# Patient Record
Sex: FEMALE | Race: NATIVE HAWAIIAN OR OTHER PACIFIC ISLANDER | NOT HISPANIC OR LATINO | URBAN - METROPOLITAN AREA
[De-identification: names, ages, dates, MRNs, and addresses within clinical notes are randomized per-mention and may not be internally consistent; named-entity substitution may affect disease eponyms.]

---

## 2017-01-14 ENCOUNTER — INPATIENT (INPATIENT)
Facility: HOSPITAL | Age: 77
LOS: 11 days | Discharge: HOME | DRG: 64 | End: 2017-01-26
Attending: INTERNAL MEDICINE | Admitting: INTERNAL MEDICINE
Payer: COMMERCIAL

## 2017-01-14 VITALS
DIASTOLIC BLOOD PRESSURE: 63 MMHG | WEIGHT: 179.9 LBS | HEART RATE: 97 BPM | RESPIRATION RATE: 16 BRPM | SYSTOLIC BLOOD PRESSURE: 141 MMHG | OXYGEN SATURATION: 100 %

## 2017-01-14 DIAGNOSIS — I63.9 CEREBRAL INFARCTION, UNSPECIFIED: ICD-10-CM

## 2017-01-14 DIAGNOSIS — Z90.49 ACQUIRED ABSENCE OF OTHER SPECIFIED PARTS OF DIGESTIVE TRACT: Chronic | ICD-10-CM

## 2017-01-14 DIAGNOSIS — I10 ESSENTIAL (PRIMARY) HYPERTENSION: ICD-10-CM

## 2017-01-14 DIAGNOSIS — Z41.8 ENCOUNTER FOR OTHER PROCEDURES FOR PURPOSES OTHER THAN REMEDYING HEALTH STATE: ICD-10-CM

## 2017-01-14 DIAGNOSIS — E11.9 TYPE 2 DIABETES MELLITUS WITHOUT COMPLICATIONS: ICD-10-CM

## 2017-01-14 LAB
ALBUMIN SERPL ELPH-MCNC: 3.9 G/DL — SIGNIFICANT CHANGE UP (ref 3.5–5)
ALP SERPL-CCNC: 69 U/L — SIGNIFICANT CHANGE UP (ref 40–120)
ALT FLD-CCNC: 31 U/L DA — SIGNIFICANT CHANGE UP (ref 10–60)
ANION GAP SERPL CALC-SCNC: 10 MMOL/L — SIGNIFICANT CHANGE UP (ref 5–17)
APTT BLD: 30 SEC — SIGNIFICANT CHANGE UP (ref 27.5–37.4)
AST SERPL-CCNC: 26 U/L — SIGNIFICANT CHANGE UP (ref 10–40)
BASOPHILS # BLD AUTO: 0.1 K/UL — SIGNIFICANT CHANGE UP (ref 0–0.2)
BASOPHILS NFR BLD AUTO: 0.8 % — SIGNIFICANT CHANGE UP (ref 0–2)
BILIRUB SERPL-MCNC: 0.6 MG/DL — SIGNIFICANT CHANGE UP (ref 0.2–1.2)
BUN SERPL-MCNC: 22 MG/DL — HIGH (ref 7–18)
CALCIUM SERPL-MCNC: 8.9 MG/DL — SIGNIFICANT CHANGE UP (ref 8.4–10.5)
CHLORIDE SERPL-SCNC: 97 MMOL/L — SIGNIFICANT CHANGE UP (ref 96–108)
CHOLEST SERPL-MCNC: 194 MG/DL — SIGNIFICANT CHANGE UP (ref 10–199)
CK SERPL-CCNC: 117 U/L — SIGNIFICANT CHANGE UP (ref 21–215)
CO2 SERPL-SCNC: 27 MMOL/L — SIGNIFICANT CHANGE UP (ref 22–31)
CREAT SERPL-MCNC: 1.02 MG/DL — SIGNIFICANT CHANGE UP (ref 0.5–1.3)
EOSINOPHIL # BLD AUTO: 0.6 K/UL — HIGH (ref 0–0.5)
EOSINOPHIL NFR BLD AUTO: 7.2 % — HIGH (ref 0–6)
GLUCOSE SERPL-MCNC: 188 MG/DL — HIGH (ref 70–99)
HBA1C BLD-MCNC: 7.5 % — HIGH (ref 4–5.6)
HCT VFR BLD CALC: 32.8 % — LOW (ref 34.5–45)
HDLC SERPL-MCNC: 82 MG/DL — SIGNIFICANT CHANGE UP (ref 40–125)
HGB BLD-MCNC: 11.7 G/DL — SIGNIFICANT CHANGE UP (ref 11.5–15.5)
INR BLD: 0.96 RATIO — SIGNIFICANT CHANGE UP (ref 0.88–1.16)
LIPID PNL WITH DIRECT LDL SERPL: 79 MG/DL — SIGNIFICANT CHANGE UP
LYMPHOCYTES # BLD AUTO: 2 K/UL — SIGNIFICANT CHANGE UP (ref 1–3.3)
LYMPHOCYTES # BLD AUTO: 23.2 % — SIGNIFICANT CHANGE UP (ref 13–44)
MCHC RBC-ENTMCNC: 29.2 PG — SIGNIFICANT CHANGE UP (ref 27–34)
MCHC RBC-ENTMCNC: 35.6 GM/DL — SIGNIFICANT CHANGE UP (ref 32–36)
MCV RBC AUTO: 81.9 FL — SIGNIFICANT CHANGE UP (ref 80–100)
MONOCYTES # BLD AUTO: 0.5 K/UL — SIGNIFICANT CHANGE UP (ref 0–0.9)
MONOCYTES NFR BLD AUTO: 5.4 % — SIGNIFICANT CHANGE UP (ref 2–14)
NEUTROPHILS # BLD AUTO: 5.6 K/UL — SIGNIFICANT CHANGE UP (ref 1.8–7.4)
NEUTROPHILS NFR BLD AUTO: 63.5 % — SIGNIFICANT CHANGE UP (ref 43–77)
PLATELET # BLD AUTO: 308 K/UL — SIGNIFICANT CHANGE UP (ref 150–400)
POTASSIUM SERPL-MCNC: 3.8 MMOL/L — SIGNIFICANT CHANGE UP (ref 3.5–5.3)
POTASSIUM SERPL-SCNC: 3.8 MMOL/L — SIGNIFICANT CHANGE UP (ref 3.5–5.3)
PROT SERPL-MCNC: 7.5 G/DL — SIGNIFICANT CHANGE UP (ref 6–8.3)
PROTHROM AB SERPL-ACNC: 10.8 SEC — SIGNIFICANT CHANGE UP (ref 10–13.1)
RBC # BLD: 4 M/UL — SIGNIFICANT CHANGE UP (ref 3.8–5.2)
RBC # FLD: 13.4 % — SIGNIFICANT CHANGE UP (ref 10.3–14.5)
SODIUM SERPL-SCNC: 134 MMOL/L — LOW (ref 135–145)
TOTAL CHOLESTEROL/HDL RATIO MEASUREMENT: 2.4 RATIO — LOW (ref 3.3–7.1)
TRIGL SERPL-MCNC: 166 MG/DL — HIGH (ref 10–149)
TROPONIN I SERPL-MCNC: 0.02 NG/ML — SIGNIFICANT CHANGE UP (ref 0–0.04)
TSH SERPL-MCNC: 1.27 UU/ML — SIGNIFICANT CHANGE UP (ref 0.34–4.82)
WBC # BLD: 8.8 K/UL — SIGNIFICANT CHANGE UP (ref 3.8–10.5)
WBC # FLD AUTO: 8.8 K/UL — SIGNIFICANT CHANGE UP (ref 3.8–10.5)

## 2017-01-14 PROCEDURE — 99291 CRITICAL CARE FIRST HOUR: CPT

## 2017-01-14 PROCEDURE — 99223 1ST HOSP IP/OBS HIGH 75: CPT

## 2017-01-14 RX ORDER — ATORVASTATIN CALCIUM 80 MG/1
40 TABLET, FILM COATED ORAL AT BEDTIME
Qty: 0 | Refills: 0 | Status: DISCONTINUED | OUTPATIENT
Start: 2017-01-14 | End: 2017-01-26

## 2017-01-14 RX ORDER — INSULIN LISPRO 100/ML
VIAL (ML) SUBCUTANEOUS
Qty: 0 | Refills: 0 | Status: DISCONTINUED | OUTPATIENT
Start: 2017-01-14 | End: 2017-01-26

## 2017-01-14 RX ORDER — ALTEPLASE 100 MG
66 KIT INTRAVENOUS ONCE
Qty: 0 | Refills: 0 | Status: COMPLETED | OUTPATIENT
Start: 2017-01-14 | End: 2017-01-14

## 2017-01-14 RX ORDER — ALTEPLASE 100 MG
7 KIT INTRAVENOUS ONCE
Qty: 0 | Refills: 0 | Status: COMPLETED | OUTPATIENT
Start: 2017-01-14 | End: 2017-01-14

## 2017-01-14 RX ORDER — ACETAMINOPHEN 500 MG
650 TABLET ORAL EVERY 6 HOURS
Qty: 0 | Refills: 0 | Status: DISCONTINUED | OUTPATIENT
Start: 2017-01-14 | End: 2017-01-26

## 2017-01-14 RX ORDER — LABETALOL HCL 100 MG
10 TABLET ORAL ONCE
Qty: 0 | Refills: 0 | Status: COMPLETED | OUTPATIENT
Start: 2017-01-14 | End: 2017-01-14

## 2017-01-14 RX ORDER — LIDOCAINE 4 G/100G
1 CREAM TOPICAL DAILY
Qty: 0 | Refills: 0 | Status: DISCONTINUED | OUTPATIENT
Start: 2017-01-14 | End: 2017-01-26

## 2017-01-14 RX ORDER — SODIUM CHLORIDE 9 MG/ML
1000 INJECTION, SOLUTION INTRAVENOUS
Qty: 0 | Refills: 0 | Status: DISCONTINUED | OUTPATIENT
Start: 2017-01-14 | End: 2017-01-14

## 2017-01-14 RX ORDER — INFLUENZA VIRUS VACCINE 15; 15; 15; 15 UG/.5ML; UG/.5ML; UG/.5ML; UG/.5ML
0.5 SUSPENSION INTRAMUSCULAR ONCE
Qty: 0 | Refills: 0 | Status: COMPLETED | OUTPATIENT
Start: 2017-01-14 | End: 2017-01-14

## 2017-01-14 RX ORDER — SODIUM CHLORIDE 9 MG/ML
1000 INJECTION, SOLUTION INTRAVENOUS
Qty: 0 | Refills: 0 | Status: DISCONTINUED | OUTPATIENT
Start: 2017-01-14 | End: 2017-01-15

## 2017-01-14 RX ORDER — LABETALOL HCL 100 MG
10 TABLET ORAL EVERY 8 HOURS
Qty: 0 | Refills: 0 | Status: DISCONTINUED | OUTPATIENT
Start: 2017-01-14 | End: 2017-01-16

## 2017-01-14 RX ADMIN — ALTEPLASE 420 MILLIGRAM(S): KIT at 10:22

## 2017-01-14 RX ADMIN — ATORVASTATIN CALCIUM 40 MILLIGRAM(S): 80 TABLET, FILM COATED ORAL at 22:40

## 2017-01-14 RX ADMIN — Medication 650 MILLIGRAM(S): at 18:04

## 2017-01-14 RX ADMIN — LIDOCAINE 1 PATCH: 4 CREAM TOPICAL at 14:26

## 2017-01-14 RX ADMIN — SODIUM CHLORIDE 60 MILLILITER(S): 9 INJECTION, SOLUTION INTRAVENOUS at 16:43

## 2017-01-14 RX ADMIN — Medication 650 MILLIGRAM(S): at 17:33

## 2017-01-14 RX ADMIN — ALTEPLASE 66 MILLIGRAM(S): KIT at 10:25

## 2017-01-14 RX ADMIN — SODIUM CHLORIDE 75 MILLILITER(S): 9 INJECTION, SOLUTION INTRAVENOUS at 14:59

## 2017-01-14 RX ADMIN — Medication 10 MILLIGRAM(S): at 16:57

## 2017-01-14 RX ADMIN — SODIUM CHLORIDE 50 MILLILITER(S): 9 INJECTION, SOLUTION INTRAVENOUS at 17:36

## 2017-01-14 NOTE — H&P ADULT. - ATTENDING COMMENTS
76 female with hx of HTN, fibromyalgia, presents with signs/symptoms concerning for acute CVA.  Decision made by neurology attending and ED attending to administer TPA.  Upon my exam, there was minimal residual deficits.

## 2017-01-14 NOTE — ED PROVIDER NOTE - CRITICAL CARE PROVIDED
additional history taking/interpretation of diagnostic studies/consult w/ pt's family directly relating to pts condition/consultation with other physicians/documentation/direct patient care (not related to procedure)

## 2017-01-14 NOTE — H&P ADULT. - MUSCULOSKELETAL
detailed exam no joint warmth/ROM intact/no calf tenderness/no joint swelling/no joint erythema/normal

## 2017-01-14 NOTE — ED PROVIDER NOTE - PROGRESS NOTE DETAILS
I spoke with Dr. Wilson. Agrees with plan for TPA if no contraindications. Labs pending. Extensive discussion with patient and son on risks/benefits/alternatives of TPA, including risk of ICH, worsening stroke, permanent disability, death. Patient and son consent to TPA. TPA bolus given by me. Patient endorsed to Dr. Armstrong. I spoke with Dr. Wilson. Agrees with plan for TPA if no contraindications. Labs pending. Extensive discussion with patient and son on risks/benefits/alternatives of TPA, including risk of ICH, worsening stroke, permanent disability, death. Patient and son consent to TPA. Dysphagia screen FAILED.

## 2017-01-14 NOTE — H&P ADULT. - RS GEN PE MLT RESP DETAILS PC
good air movement/normal/respirations non-labored/airway patent/no chest wall tenderness/no wheezes/breath sounds equal/no intercostal retractions/no rales/clear to auscultation bilaterally/no subcutaneous emphysema/no rhonchi

## 2017-01-14 NOTE — H&P ADULT. - PROBLEM SELECTOR PLAN 4
DVT ppx: no chemical anticoagulation for 24 hours 2/2 TPA use. On SCD boots for now. Start chemical anticoagulation 24 hours later with heparin sc once repeat CT head is done and rules out intracranial bleed..

## 2017-01-14 NOTE — H&P ADULT. - FAMILY HISTORY
Sibling  Still living? Unknown  Family history of cerebrovascular accident (CVA), Age at diagnosis: Age Unknown     Mother  Still living? Unknown  Family history of acute myocardial infarction, Age at diagnosis: Age Unknown

## 2017-01-14 NOTE — ED PROVIDER NOTE - PMH
<<----- Click to add NO pertinent Past Medical History DM (diabetes mellitus)    Fibromyalgia    HTN (hypertension)

## 2017-01-14 NOTE — H&P ADULT. - HISTORY OF PRESENT ILLNESS
75 yo female, from home, ambulates w/o assistance, PMHx of Fibromyalgia (on Lyrica 75 mg and Aleve), HTN (on ASA 81mg and Losartin 100/25), and DM (on Meformin 850mg), BIBA presents to the ED for AMS today. As per Son, pt woke up this morning acting normally, able to ambulate (Last seen at baseline 1 hour PTA (08:10AM)), but then pt developed L sided weakness and slurred speech. Son also notes that pt fell 36 hours ago in the middle of the night, unknown cause, with slurred speech, was given ASA 81mg and Magnesium to relief in Sx. Pt doesn't take plavix, coumadin, eliquis. Pt denies ha, fever, chills, abd pain, n/v/d, dysuria, hematuria, CP, SOB, previous CVA, recent surgery, blood in stool, or any other complaints. NKDA. 75 yo female, from home, ambulates w/o assistance, PMHx of Fibromyalgia (on Lyrica 75 mg and Aleve), HTN (on ASA 81mg and Losartin 100/25), and DM2 (on Meformin 850mg BID), BIBA presents to the ED for AMS, slurred speech and weakness today. As per pt. and Son, she woke up this morning acting normally, able to ambulate, took a shower but was feeling some imbalance and disequilibrium and had to sit down for a while. Afterwards she had breakfast and took her morning medications and was sitting on a chair, started feeling her tongue heavy and was not able to speak properly. Son saw her right away 8:35am noticed slurred speech, drooling with mild left facial droop, alter mental status and slouched and generalized weakness. EMS called immediately and in ED was noted left sided weakness and slurred speech, code stroke was called.   Son also notes that pt fell 36 hours ago in the middle of the night, unknown cause, with slurred speech, was given ASA 81mg and Magnesium to relief in Sx. Pt doesn't take plavix, coumadin, eliquis.   ROS negative except as above. Pt denies headache, fever, chills, abdominal pain, nausea, vomiting, diarrhea, dysuria, hematuria, CP, SOB, palpitations, previous CVA, recent surgery, blood in stool, or any other complaints.

## 2017-01-14 NOTE — ED ADULT NURSE NOTE - OBJECTIVE STATEMENT
YADIRA as a notification for rule out stroke, as per son slurred speech left side weakness today and was last seen normal at 0810 today; also report that he found pt on the floor after she took lyrica 2 days ago, did not witness her falling down on the floor. YADIRA as a notification for rule out stroke, as per son slurred speech left side weakness and left facial droop today and was last seen normal at 0810 today; also report that he found pt on the floor after she took lyrica 2 days ago, did not witness her falling down on the floor.

## 2017-01-14 NOTE — ED PROVIDER NOTE - OBJECTIVE STATEMENT
75 y/o female BIBA with PMHx of Fibromyalgia (on Lyrica 75 mg and Aleve), HTN (on ASA 81mg and Losartin 100/25), and DM (on Meformin 850mg) presents to the ED for AMS today. As per Son, pt woke up this morning acting normally, able to ambulate (Last seen at baseline 1 hour PTA (08:10AM)), but then pt developed L sided weakness and slurred speech. Son also notes that pt fell 36 hours ago in the middle of the night, unknown cause with slurred speech, was given ASA 81mg and Magnesium to relief in Sx. Pt doesn't take blood thinners. Pt denies fever, chills, abd pain, n/v/d, dysuria, hematuria, CP, SOB, previous CVA, recent surgery, blood in stool, or any other complaints. NKDA. 75 y/o female BIBA with PMHx of Fibromyalgia (on Lyrica 75 mg and Aleve), HTN (on ASA 81mg and Losartin 100/25), and DM (on Meformin 850mg) presents to the ED for AMS today. As per Son, pt woke up this morning acting normally, able to ambulate (Last seen at baseline 1 hour PTA (08:10AM)), but then pt developed L sided weakness and slurred speech. Son also notes that pt fell 36 hours ago in the middle of the night, unknown cause, with slurred speech, was given ASA 81mg and Magnesium to relief in Sx. Pt doesn't take plavix, coumadin, eliquis. Pt denies ha, fever, chills, abd pain, n/v/d, dysuria, hematuria, CP, SOB, previous CVA, recent surgery, blood in stool, or any other complaints. NKDA.

## 2017-01-14 NOTE — H&P ADULT. - PROBLEM SELECTOR PLAN 2
holding BP med (losartan/HCTZ) for now for permissive HTN for now due to CVA, will start D5NS to optimize cerebral perfusion.   -s/p Tpa maintaining BP<185/105, then will allow for permissive HTN and maintain BP <180/105 for 72 hours.   -Start labetalol push if HR is high and SBP>180 as pt received tpa.   -Vitals check Q1 hour as per ICU protocol.

## 2017-01-14 NOTE — H&P ADULT. - PROBLEM SELECTOR PLAN 1
presented with sudden onset of unresponsiveness, aphasia/slurred speech, generalized weakness around 8:30am, EMS called and brought to the ED  -In ED code stroke called at 09:39 am. ED attending spoke to Dr. Wilson who agreed for TPA if no contraindications, CT head showed no acute findings, including no intracranial bleeding, tPA given at 10:20am.  -EKG: NSR @94bpm, RBBB  -Will keep SBP<180/105 s/p tPA.   -no aspirin and no chemical anticoagulation for 24 hours.   -will start on moderate intensity statin, lipitor 40 qhs.   -f/u lipid panel, cardiac enzymes (1 set) negative.   -Neuro checks s/p TPA q15 min for 1 hour f/b q30 min for 2 hours f/b q1hr for 4 hours and then q2.   -No IV lines, no blood draws for 24 hours.   -Rpt CT head if mental status or neuro check changes, or repeat CT head in 24 hours.   -Rpt labs after 24 hours  -f/u echo, carotid doppler  -PT eval  -NPO for now until bedside swallow eval.   -Aspiration precaution, fall precaution.  -neurology dr. Wilson. presented with sudden onset of unresponsiveness, aphasia/slurred speech, generalized weakness around 8:30am, EMS called and brought to the ED  -In ED code stroke called at 09:39 am. ED attending spoke to Dr. Wilson who agreed for TPA if no contraindications, CT head showed no acute findings, including no intracranial bleeding, tPA given at 10:20am.  -EKG: NSR @94bpm, RBBB  -Will keep SBP<180/105 s/p tPA.   -no aspirin and no chemical anticoagulation for 24 hours.   -will start on moderate intensity statin, lipitor 40 qhs.   -f/u lipid panel, cardiac enzymes (1 set) negative.   -Neuro checks s/p TPA q15 min for 1 hour f/b q30 min for 2 hours f/b q1hr for 4 hours and then q2.   -No IV lines, no blood draws for 24 hours.   -Rpt CT head if mental status or neuro check changes, or repeat CT head in 24 hours.   -Rpt labs after 24 hours  -f/u MRI brain, MRA head, echo, carotid doppler  -PT eval  -NPO for now until bedside swallow eval after 24 hours from tPA.   -Aspiration precaution, fall precaution.  -neurology dr. Wilson.

## 2017-01-14 NOTE — ED PROVIDER NOTE - NS ED MD SCRIBE ATTENDING SCRIBE SECTIONS
REVIEW OF SYSTEMS/VITAL SIGNS( Pullset)/HISTORY OF PRESENT ILLNESS/PHYSICAL EXAM/PAST MEDICAL/SURGICAL/SOCIAL HISTORY/HIV/DISPOSITION

## 2017-01-14 NOTE — H&P ADULT. - ASSESSMENT
In ED code stroke called at 09:39 am. ED attending spoke to Dr. Wilson who agreed for TPA if no contraindications, CT head showed no acute findings, including no intracranial bleeding, tPA given at 10:20am. 77 yo female, from home, ambulates w/o assistance, PMHx of Fibromyalgia (on Lyrica 75 mg and Aleve), HTN (on ASA 81mg and Losartin 100/25), and DM2 (on Meformin 850mg BID), BIBA presents to the ED for AMS, slurred speech and weakness today, last seen normal 8:10am, as per son symptoms started ~8:35am, EMS called.   In ED code stroke called at 09:39 am. ED attending spoke to Dr. Wilson who agreed for TPA if no contraindications, CT head showed no acute findings, including no intracranial bleeding, tPA given at 10:20am.

## 2017-01-15 LAB
ALBUMIN SERPL ELPH-MCNC: 3.5 G/DL — SIGNIFICANT CHANGE UP (ref 3.5–5)
ALP SERPL-CCNC: 58 U/L — SIGNIFICANT CHANGE UP (ref 40–120)
ALT FLD-CCNC: 30 U/L DA — SIGNIFICANT CHANGE UP (ref 10–60)
ANION GAP SERPL CALC-SCNC: 12 MMOL/L — SIGNIFICANT CHANGE UP (ref 5–17)
AST SERPL-CCNC: 30 U/L — SIGNIFICANT CHANGE UP (ref 10–40)
BILIRUB DIRECT SERPL-MCNC: 0.1 MG/DL — SIGNIFICANT CHANGE UP (ref 0–0.2)
BILIRUB INDIRECT FLD-MCNC: 0.8 MG/DL — SIGNIFICANT CHANGE UP (ref 0.2–1)
BILIRUB SERPL-MCNC: 0.9 MG/DL — SIGNIFICANT CHANGE UP (ref 0.2–1.2)
BUN SERPL-MCNC: 14 MG/DL — SIGNIFICANT CHANGE UP (ref 7–18)
CALCIUM SERPL-MCNC: 8.7 MG/DL — SIGNIFICANT CHANGE UP (ref 8.4–10.5)
CHLORIDE SERPL-SCNC: 98 MMOL/L — SIGNIFICANT CHANGE UP (ref 96–108)
CK MB BLD-MCNC: 1.1 % — SIGNIFICANT CHANGE UP (ref 0–3.5)
CK MB CFR SERPL CALC: 1.7 NG/ML — SIGNIFICANT CHANGE UP (ref 0–3.6)
CK SERPL-CCNC: 159 U/L — SIGNIFICANT CHANGE UP (ref 21–215)
CO2 SERPL-SCNC: 24 MMOL/L — SIGNIFICANT CHANGE UP (ref 22–31)
CREAT SERPL-MCNC: 0.73 MG/DL — SIGNIFICANT CHANGE UP (ref 0.5–1.3)
GLUCOSE SERPL-MCNC: 119 MG/DL — HIGH (ref 70–99)
HBA1C BLD-MCNC: 7.3 % — HIGH (ref 4–5.6)
MAGNESIUM SERPL-MCNC: 1.8 MG/DL — SIGNIFICANT CHANGE UP (ref 1.8–2.4)
PHOSPHATE SERPL-MCNC: 3.1 MG/DL — SIGNIFICANT CHANGE UP (ref 2.5–4.5)
POTASSIUM SERPL-MCNC: 5 MMOL/L — SIGNIFICANT CHANGE UP (ref 3.5–5.3)
POTASSIUM SERPL-SCNC: 5 MMOL/L — SIGNIFICANT CHANGE UP (ref 3.5–5.3)
PROT SERPL-MCNC: 7 G/DL — SIGNIFICANT CHANGE UP (ref 6–8.3)
SODIUM SERPL-SCNC: 134 MMOL/L — LOW (ref 135–145)
TROPONIN I SERPL-MCNC: 0.03 NG/ML — SIGNIFICANT CHANGE UP (ref 0–0.04)

## 2017-01-15 PROCEDURE — 99291 CRITICAL CARE FIRST HOUR: CPT

## 2017-01-15 RX ORDER — NIMODIPINE 60 MG/10ML
60 SOLUTION ORAL EVERY 4 HOURS
Qty: 0 | Refills: 0 | Status: DISCONTINUED | OUTPATIENT
Start: 2017-01-15 | End: 2017-01-15

## 2017-01-15 RX ORDER — HEPARIN SODIUM 5000 [USP'U]/ML
5000 INJECTION INTRAVENOUS; SUBCUTANEOUS EVERY 8 HOURS
Qty: 0 | Refills: 0 | Status: DISCONTINUED | OUTPATIENT
Start: 2017-01-15 | End: 2017-01-15

## 2017-01-15 RX ORDER — ASPIRIN/CALCIUM CARB/MAGNESIUM 324 MG
81 TABLET ORAL DAILY
Qty: 0 | Refills: 0 | Status: DISCONTINUED | OUTPATIENT
Start: 2017-01-15 | End: 2017-01-26

## 2017-01-15 RX ORDER — LOSARTAN POTASSIUM 100 MG/1
25 TABLET, FILM COATED ORAL DAILY
Qty: 0 | Refills: 0 | Status: DISCONTINUED | OUTPATIENT
Start: 2017-01-15 | End: 2017-01-26

## 2017-01-15 RX ORDER — NIMODIPINE 60 MG/10ML
60 SOLUTION ORAL EVERY 4 HOURS
Qty: 0 | Refills: 0 | Status: DISCONTINUED | OUTPATIENT
Start: 2017-01-15 | End: 2017-01-26

## 2017-01-15 RX ADMIN — LIDOCAINE 1 PATCH: 4 CREAM TOPICAL at 03:26

## 2017-01-15 RX ADMIN — Medication 1: at 11:51

## 2017-01-15 RX ADMIN — LOSARTAN POTASSIUM 25 MILLIGRAM(S): 100 TABLET, FILM COATED ORAL at 15:58

## 2017-01-15 RX ADMIN — Medication 2: at 18:13

## 2017-01-15 RX ADMIN — LIDOCAINE 1 PATCH: 4 CREAM TOPICAL at 11:47

## 2017-01-15 RX ADMIN — HEPARIN SODIUM 5000 UNIT(S): 5000 INJECTION INTRAVENOUS; SUBCUTANEOUS at 15:00

## 2017-01-15 RX ADMIN — Medication: at 06:29

## 2017-01-15 RX ADMIN — ATORVASTATIN CALCIUM 40 MILLIGRAM(S): 80 TABLET, FILM COATED ORAL at 21:11

## 2017-01-15 RX ADMIN — LIDOCAINE 1 PATCH: 4 CREAM TOPICAL at 23:41

## 2017-01-15 RX ADMIN — Medication 81 MILLIGRAM(S): at 15:58

## 2017-01-15 RX ADMIN — NIMODIPINE 60 MILLIGRAM(S): 60 SOLUTION ORAL at 21:11

## 2017-01-15 NOTE — PHYSICAL THERAPY INITIAL EVALUATION ADULT - GENERAL OBSERVATIONS, REHAB EVAL
awake, alert, NAD; IV in situ on right antecubital fossa area and right wrist; + indwelling urethral catheter

## 2017-01-15 NOTE — PHYSICAL THERAPY INITIAL EVALUATION ADULT - MANUAL MUSCLE TESTING RESULTS, REHAB EVAL
MMT on right upper and lower extremities are grossly graded 4/5; MMT on left upper and lower extremities are grossly graded 3+/5

## 2017-01-16 LAB
ANION GAP SERPL CALC-SCNC: 11 MMOL/L — SIGNIFICANT CHANGE UP (ref 5–17)
BUN SERPL-MCNC: 15 MG/DL — SIGNIFICANT CHANGE UP (ref 7–18)
CALCIUM SERPL-MCNC: 9.3 MG/DL — SIGNIFICANT CHANGE UP (ref 8.4–10.5)
CHLORIDE SERPL-SCNC: 100 MMOL/L — SIGNIFICANT CHANGE UP (ref 96–108)
CO2 SERPL-SCNC: 25 MMOL/L — SIGNIFICANT CHANGE UP (ref 22–31)
CREAT SERPL-MCNC: 0.88 MG/DL — SIGNIFICANT CHANGE UP (ref 0.5–1.3)
GLUCOSE SERPL-MCNC: 193 MG/DL — HIGH (ref 70–99)
HCT VFR BLD CALC: 36.4 % — SIGNIFICANT CHANGE UP (ref 34.5–45)
HGB BLD-MCNC: 12.4 G/DL — SIGNIFICANT CHANGE UP (ref 11.5–15.5)
MAGNESIUM SERPL-MCNC: 2 MG/DL — SIGNIFICANT CHANGE UP (ref 1.8–2.4)
MCHC RBC-ENTMCNC: 28.3 PG — SIGNIFICANT CHANGE UP (ref 27–34)
MCHC RBC-ENTMCNC: 34 GM/DL — SIGNIFICANT CHANGE UP (ref 32–36)
MCV RBC AUTO: 83.3 FL — SIGNIFICANT CHANGE UP (ref 80–100)
PHOSPHATE SERPL-MCNC: 3 MG/DL — SIGNIFICANT CHANGE UP (ref 2.5–4.5)
PLATELET # BLD AUTO: 342 K/UL — SIGNIFICANT CHANGE UP (ref 150–400)
POTASSIUM SERPL-MCNC: 4 MMOL/L — SIGNIFICANT CHANGE UP (ref 3.5–5.3)
POTASSIUM SERPL-SCNC: 4 MMOL/L — SIGNIFICANT CHANGE UP (ref 3.5–5.3)
RBC # BLD: 4.37 M/UL — SIGNIFICANT CHANGE UP (ref 3.8–5.2)
RBC # FLD: 13.7 % — SIGNIFICANT CHANGE UP (ref 10.3–14.5)
SODIUM SERPL-SCNC: 136 MMOL/L — SIGNIFICANT CHANGE UP (ref 135–145)
WBC # BLD: 11.4 K/UL — HIGH (ref 3.8–10.5)
WBC # FLD AUTO: 11.4 K/UL — HIGH (ref 3.8–10.5)

## 2017-01-16 PROCEDURE — 93880 EXTRACRANIAL BILAT STUDY: CPT | Mod: 26

## 2017-01-16 RX ORDER — LANOLIN ALCOHOL/MO/W.PET/CERES
3 CREAM (GRAM) TOPICAL AT BEDTIME
Qty: 0 | Refills: 0 | Status: COMPLETED | OUTPATIENT
Start: 2017-01-16 | End: 2017-01-17

## 2017-01-16 RX ORDER — PANTOPRAZOLE SODIUM 20 MG/1
40 TABLET, DELAYED RELEASE ORAL
Qty: 0 | Refills: 0 | Status: DISCONTINUED | OUTPATIENT
Start: 2017-01-16 | End: 2017-01-26

## 2017-01-16 RX ADMIN — NIMODIPINE 60 MILLIGRAM(S): 60 SOLUTION ORAL at 01:06

## 2017-01-16 RX ADMIN — NIMODIPINE 60 MILLIGRAM(S): 60 SOLUTION ORAL at 06:03

## 2017-01-16 RX ADMIN — ATORVASTATIN CALCIUM 40 MILLIGRAM(S): 80 TABLET, FILM COATED ORAL at 21:39

## 2017-01-16 RX ADMIN — NIMODIPINE 60 MILLIGRAM(S): 60 SOLUTION ORAL at 21:39

## 2017-01-16 RX ADMIN — LIDOCAINE 1 PATCH: 4 CREAM TOPICAL at 13:58

## 2017-01-16 RX ADMIN — NIMODIPINE 60 MILLIGRAM(S): 60 SOLUTION ORAL at 17:26

## 2017-01-16 RX ADMIN — Medication 2: at 17:26

## 2017-01-16 RX ADMIN — NIMODIPINE 60 MILLIGRAM(S): 60 SOLUTION ORAL at 10:33

## 2017-01-16 RX ADMIN — LOSARTAN POTASSIUM 25 MILLIGRAM(S): 100 TABLET, FILM COATED ORAL at 06:03

## 2017-01-16 RX ADMIN — Medication 2: at 08:30

## 2017-01-16 RX ADMIN — Medication 1: at 13:58

## 2017-01-16 RX ADMIN — Medication 3 MILLIGRAM(S): at 21:39

## 2017-01-16 RX ADMIN — NIMODIPINE 60 MILLIGRAM(S): 60 SOLUTION ORAL at 13:58

## 2017-01-16 RX ADMIN — Medication 81 MILLIGRAM(S): at 13:59

## 2017-01-17 LAB
ANION GAP SERPL CALC-SCNC: 11 MMOL/L — SIGNIFICANT CHANGE UP (ref 5–17)
BASOPHILS # BLD AUTO: 0.1 K/UL — SIGNIFICANT CHANGE UP (ref 0–0.2)
BASOPHILS NFR BLD AUTO: 0.9 % — SIGNIFICANT CHANGE UP (ref 0–2)
BUN SERPL-MCNC: 24 MG/DL — HIGH (ref 7–18)
CALCIUM SERPL-MCNC: 8.9 MG/DL — SIGNIFICANT CHANGE UP (ref 8.4–10.5)
CHLORIDE SERPL-SCNC: 102 MMOL/L — SIGNIFICANT CHANGE UP (ref 96–108)
CO2 SERPL-SCNC: 25 MMOL/L — SIGNIFICANT CHANGE UP (ref 22–31)
CREAT SERPL-MCNC: 1 MG/DL — SIGNIFICANT CHANGE UP (ref 0.5–1.3)
EOSINOPHIL # BLD AUTO: 0.3 K/UL — SIGNIFICANT CHANGE UP (ref 0–0.5)
EOSINOPHIL NFR BLD AUTO: 2.7 % — SIGNIFICANT CHANGE UP (ref 0–6)
GLUCOSE SERPL-MCNC: 204 MG/DL — HIGH (ref 70–99)
HCT VFR BLD CALC: 35.8 % — SIGNIFICANT CHANGE UP (ref 34.5–45)
HGB BLD-MCNC: 12.2 G/DL — SIGNIFICANT CHANGE UP (ref 11.5–15.5)
LYMPHOCYTES # BLD AUTO: 1.8 K/UL — SIGNIFICANT CHANGE UP (ref 1–3.3)
LYMPHOCYTES # BLD AUTO: 14.1 % — SIGNIFICANT CHANGE UP (ref 13–44)
MCHC RBC-ENTMCNC: 28.7 PG — SIGNIFICANT CHANGE UP (ref 27–34)
MCHC RBC-ENTMCNC: 34.2 GM/DL — SIGNIFICANT CHANGE UP (ref 32–36)
MCV RBC AUTO: 83.9 FL — SIGNIFICANT CHANGE UP (ref 80–100)
MONOCYTES # BLD AUTO: 0.9 K/UL — SIGNIFICANT CHANGE UP (ref 0–0.9)
MONOCYTES NFR BLD AUTO: 6.8 % — SIGNIFICANT CHANGE UP (ref 2–14)
NEUTROPHILS # BLD AUTO: 9.7 K/UL — HIGH (ref 1.8–7.4)
NEUTROPHILS NFR BLD AUTO: 75.5 % — SIGNIFICANT CHANGE UP (ref 43–77)
PLATELET # BLD AUTO: 317 K/UL — SIGNIFICANT CHANGE UP (ref 150–400)
POTASSIUM SERPL-MCNC: 4.1 MMOL/L — SIGNIFICANT CHANGE UP (ref 3.5–5.3)
POTASSIUM SERPL-SCNC: 4.1 MMOL/L — SIGNIFICANT CHANGE UP (ref 3.5–5.3)
RBC # BLD: 4.27 M/UL — SIGNIFICANT CHANGE UP (ref 3.8–5.2)
RBC # FLD: 14.4 % — SIGNIFICANT CHANGE UP (ref 10.3–14.5)
SODIUM SERPL-SCNC: 138 MMOL/L — SIGNIFICANT CHANGE UP (ref 135–145)
WBC # BLD: 12.8 K/UL — HIGH (ref 3.8–10.5)
WBC # FLD AUTO: 12.8 K/UL — HIGH (ref 3.8–10.5)

## 2017-01-17 PROCEDURE — 70551 MRI BRAIN STEM W/O DYE: CPT | Mod: 26

## 2017-01-17 PROCEDURE — 99233 SBSQ HOSP IP/OBS HIGH 50: CPT

## 2017-01-17 RX ADMIN — LIDOCAINE 1 PATCH: 4 CREAM TOPICAL at 23:15

## 2017-01-17 RX ADMIN — ATORVASTATIN CALCIUM 40 MILLIGRAM(S): 80 TABLET, FILM COATED ORAL at 21:24

## 2017-01-17 RX ADMIN — Medication 81 MILLIGRAM(S): at 11:50

## 2017-01-17 RX ADMIN — Medication 1: at 18:40

## 2017-01-17 RX ADMIN — LIDOCAINE 1 PATCH: 4 CREAM TOPICAL at 11:51

## 2017-01-17 RX ADMIN — LIDOCAINE 1 PATCH: 4 CREAM TOPICAL at 01:36

## 2017-01-17 RX ADMIN — Medication 5 MILLIGRAM(S): at 18:40

## 2017-01-17 RX ADMIN — Medication 2: at 08:40

## 2017-01-17 RX ADMIN — NIMODIPINE 60 MILLIGRAM(S): 60 SOLUTION ORAL at 21:25

## 2017-01-17 RX ADMIN — Medication 3 MILLIGRAM(S): at 21:25

## 2017-01-17 RX ADMIN — Medication 2: at 11:51

## 2017-01-17 RX ADMIN — NIMODIPINE 60 MILLIGRAM(S): 60 SOLUTION ORAL at 06:28

## 2017-01-17 RX ADMIN — NIMODIPINE 60 MILLIGRAM(S): 60 SOLUTION ORAL at 18:39

## 2017-01-17 RX ADMIN — LOSARTAN POTASSIUM 25 MILLIGRAM(S): 100 TABLET, FILM COATED ORAL at 06:27

## 2017-01-17 RX ADMIN — NIMODIPINE 60 MILLIGRAM(S): 60 SOLUTION ORAL at 02:45

## 2017-01-17 RX ADMIN — PANTOPRAZOLE SODIUM 40 MILLIGRAM(S): 20 TABLET, DELAYED RELEASE ORAL at 06:27

## 2017-01-17 RX ADMIN — NIMODIPINE 60 MILLIGRAM(S): 60 SOLUTION ORAL at 11:50

## 2017-01-18 LAB
ANION GAP SERPL CALC-SCNC: 11 MMOL/L — SIGNIFICANT CHANGE UP (ref 5–17)
APPEARANCE UR: ABNORMAL
BASOPHILS # BLD AUTO: 0.1 K/UL — SIGNIFICANT CHANGE UP (ref 0–0.2)
BASOPHILS NFR BLD AUTO: 1 % — SIGNIFICANT CHANGE UP (ref 0–2)
BILIRUB UR-MCNC: ABNORMAL
BUN SERPL-MCNC: 28 MG/DL — HIGH (ref 7–18)
CALCIUM SERPL-MCNC: 8.8 MG/DL — SIGNIFICANT CHANGE UP (ref 8.4–10.5)
CHLORIDE SERPL-SCNC: 102 MMOL/L — SIGNIFICANT CHANGE UP (ref 96–108)
CO2 SERPL-SCNC: 23 MMOL/L — SIGNIFICANT CHANGE UP (ref 22–31)
COLOR SPEC: YELLOW — SIGNIFICANT CHANGE UP
CREAT SERPL-MCNC: 1.01 MG/DL — SIGNIFICANT CHANGE UP (ref 0.5–1.3)
DIFF PNL FLD: ABNORMAL
EOSINOPHIL # BLD AUTO: 0.4 K/UL — SIGNIFICANT CHANGE UP (ref 0–0.5)
EOSINOPHIL NFR BLD AUTO: 3.1 % — SIGNIFICANT CHANGE UP (ref 0–6)
GLUCOSE SERPL-MCNC: 209 MG/DL — HIGH (ref 70–99)
GLUCOSE UR QL: 50 MG/DL
HCT VFR BLD CALC: 34.2 % — LOW (ref 34.5–45)
HGB BLD-MCNC: 11.7 G/DL — SIGNIFICANT CHANGE UP (ref 11.5–15.5)
KETONES UR-MCNC: NEGATIVE — SIGNIFICANT CHANGE UP
LEUKOCYTE ESTERASE UR-ACNC: ABNORMAL
LYMPHOCYTES # BLD AUTO: 1.7 K/UL — SIGNIFICANT CHANGE UP (ref 1–3.3)
LYMPHOCYTES # BLD AUTO: 12.6 % — LOW (ref 13–44)
MCHC RBC-ENTMCNC: 28.7 PG — SIGNIFICANT CHANGE UP (ref 27–34)
MCHC RBC-ENTMCNC: 34.2 GM/DL — SIGNIFICANT CHANGE UP (ref 32–36)
MCV RBC AUTO: 83.8 FL — SIGNIFICANT CHANGE UP (ref 80–100)
MONOCYTES # BLD AUTO: 0.9 K/UL — SIGNIFICANT CHANGE UP (ref 0–0.9)
MONOCYTES NFR BLD AUTO: 6.6 % — SIGNIFICANT CHANGE UP (ref 2–14)
NEUTROPHILS # BLD AUTO: 10.3 K/UL — HIGH (ref 1.8–7.4)
NEUTROPHILS NFR BLD AUTO: 76.8 % — SIGNIFICANT CHANGE UP (ref 43–77)
NITRITE UR-MCNC: NEGATIVE — SIGNIFICANT CHANGE UP
PH UR: 5 — SIGNIFICANT CHANGE UP (ref 4.8–8)
PLATELET # BLD AUTO: 325 K/UL — SIGNIFICANT CHANGE UP (ref 150–400)
POTASSIUM SERPL-MCNC: 3.9 MMOL/L — SIGNIFICANT CHANGE UP (ref 3.5–5.3)
POTASSIUM SERPL-SCNC: 3.9 MMOL/L — SIGNIFICANT CHANGE UP (ref 3.5–5.3)
PROT UR-MCNC: 100
RBC # BLD: 4.07 M/UL — SIGNIFICANT CHANGE UP (ref 3.8–5.2)
RBC # FLD: 13.9 % — SIGNIFICANT CHANGE UP (ref 10.3–14.5)
SODIUM SERPL-SCNC: 136 MMOL/L — SIGNIFICANT CHANGE UP (ref 135–145)
SP GR SPEC: 1.02 — SIGNIFICANT CHANGE UP (ref 1.01–1.02)
UROBILINOGEN FLD QL: NEGATIVE — SIGNIFICANT CHANGE UP
WBC # BLD: 13.4 K/UL — HIGH (ref 3.8–10.5)
WBC # FLD AUTO: 13.4 K/UL — HIGH (ref 3.8–10.5)

## 2017-01-18 PROCEDURE — 70544 MR ANGIOGRAPHY HEAD W/O DYE: CPT | Mod: 26

## 2017-01-18 RX ORDER — INSULIN GLARGINE 100 [IU]/ML
15 INJECTION, SOLUTION SUBCUTANEOUS AT BEDTIME
Qty: 0 | Refills: 0 | Status: DISCONTINUED | OUTPATIENT
Start: 2017-01-18 | End: 2017-01-26

## 2017-01-18 RX ORDER — INSULIN LISPRO 100/ML
4 VIAL (ML) SUBCUTANEOUS
Qty: 0 | Refills: 0 | Status: DISCONTINUED | OUTPATIENT
Start: 2017-01-18 | End: 2017-01-26

## 2017-01-18 RX ORDER — LANOLIN ALCOHOL/MO/W.PET/CERES
3 CREAM (GRAM) TOPICAL ONCE
Qty: 0 | Refills: 0 | Status: COMPLETED | OUTPATIENT
Start: 2017-01-18 | End: 2017-01-18

## 2017-01-18 RX ADMIN — NIMODIPINE 60 MILLIGRAM(S): 60 SOLUTION ORAL at 13:04

## 2017-01-18 RX ADMIN — Medication 3: at 08:20

## 2017-01-18 RX ADMIN — PANTOPRAZOLE SODIUM 40 MILLIGRAM(S): 20 TABLET, DELAYED RELEASE ORAL at 05:32

## 2017-01-18 RX ADMIN — LOSARTAN POTASSIUM 25 MILLIGRAM(S): 100 TABLET, FILM COATED ORAL at 05:32

## 2017-01-18 RX ADMIN — NIMODIPINE 60 MILLIGRAM(S): 60 SOLUTION ORAL at 17:20

## 2017-01-18 RX ADMIN — Medication 2: at 17:15

## 2017-01-18 RX ADMIN — Medication 4 UNIT(S): at 17:14

## 2017-01-18 RX ADMIN — Medication 3 MILLIGRAM(S): at 22:21

## 2017-01-18 RX ADMIN — LIDOCAINE 1 PATCH: 4 CREAM TOPICAL at 12:53

## 2017-01-18 RX ADMIN — Medication 2: at 12:53

## 2017-01-18 RX ADMIN — Medication 4 UNIT(S): at 12:53

## 2017-01-18 RX ADMIN — NIMODIPINE 60 MILLIGRAM(S): 60 SOLUTION ORAL at 05:32

## 2017-01-18 RX ADMIN — Medication 81 MILLIGRAM(S): at 12:52

## 2017-01-18 RX ADMIN — NIMODIPINE 60 MILLIGRAM(S): 60 SOLUTION ORAL at 22:21

## 2017-01-18 RX ADMIN — ATORVASTATIN CALCIUM 40 MILLIGRAM(S): 80 TABLET, FILM COATED ORAL at 22:21

## 2017-01-18 RX ADMIN — Medication 5 MILLIGRAM(S): at 08:20

## 2017-01-18 RX ADMIN — NIMODIPINE 60 MILLIGRAM(S): 60 SOLUTION ORAL at 02:18

## 2017-01-18 RX ADMIN — INSULIN GLARGINE 15 UNIT(S): 100 INJECTION, SOLUTION SUBCUTANEOUS at 22:21

## 2017-01-18 NOTE — DISCHARGE NOTE ADULT - PATIENT PORTAL LINK FT
“You can access the [facility] Patient Portal, offered by [facility], by registering with the following website: [email or web address]”

## 2017-01-18 NOTE — DISCHARGE NOTE ADULT - PLAN OF CARE
Go to rehab, follow up with outpatient Vascular surgeon Please continue to take aspirin and lipitor. Please go to acute rehab and follow up with their recommendations. Please follow up with vascular surgeon Dr. Brown for left carotid endarterectomy as outpatient for the stenosis in your left carotid artery. Please follow up with your primary physician to tell him of what has transpired while you were hospitalized. Please follow up with neurologist Dr. Wilson in 2 weeks for a follow up visit. Control blood pressure Please continue to take nimodipine until Feb 4, 2017. Please continue to take losartan. After Feb 4th, have a follow up visit with Dr. Abebe who will assess your blood pressure off of the nimodipine and adjust your blood pressure medications. Control blood sugar Your blood sugar was well controlled in the hospital on Lantus and premeal humalog. We will recommend that for now while you are in acute rehab. Please follow up with their recommendations for post rehab diabetic regimen. Control pain Please use tylenol and the lidoderm patch as pain control as prescribed. Follow up with your PCP for future pain med adjustments. Please continue to take aspirin and lipitor. Please participate in home PT and follow up with their recommendations. Please follow up with vascular surgeon Dr. Brown for left carotid endarterectomy as outpatient for the stenosis in your left carotid artery. Please follow up with your primary physician to tell him of what has transpired while you were hospitalized. Please follow up with neurologist Dr. Wilson in 2 weeks for a follow up visit. Your blood sugar was well controlled with metformin. We will continue that medication for you.

## 2017-01-18 NOTE — DISCHARGE NOTE ADULT - CARE PROVIDER_API CALL
Gregor Brown), Surgery; Vascular Surgery  [address]  [place] [address]  Phone: [phone]  Fax: [phone]    Yung Abebe), Cardiology Medicine  [address] [place] [address]  Phone: [phone]  Fax: [phone]    Oralia Wilson), Neurology  [address] [place] [address]  Phone: [phone]  Fax: [phone]

## 2017-01-18 NOTE — DISCHARGE NOTE ADULT - HOSPITAL COURSE
77 y/o F w/ PMH of Fibromyalgia (on Lyrica 75 mg and Aleve), HTN (on ASA 81mg and Losartin 100/25), and DM2 (on Meformin 850mg BID) p/w AMS, slurred speech and weakness. CT head negative, s/p tPA and admitted to ICU for monitoring.     1.Right MCA infarct and small frontal SAH: likely 2/2 uncontrolled HTN   -NIHSS score 8, s/p tPA and ICU monitoring   -Repeat CT head: probable evolving acute infarct in the right frontal white matter/corona radiata. New acute sulcal subarachnoid hemorrhage in the right frontal region.   -Carotid US b/l:  Right carotid system wnl. Left carotid system: Arteriosclerotic plaque. Carotid artery corresponding to 50-69% degree of stenosis.    -CTA Neck: >70% stenosis in the left carotid artery and 50-69% stenosis in the right carotid artery. --> vascular consulted, outpatient follow up for left carotid endarterectomy after discharge.  -MRI head: Acute right MCA territorial infarction without significant change. Small right frontal subarachnoid hemorrhage without significant change  -KEDAR showed no evidence of thrombus  -MRA showed atherosclerotic disease of the right MCA artery  -c/w Aspirin, Lipitor and Nimodipine  -c/w regular diet as patient does not have dysphagia     2. DM: moderately controlled, HbA1c 7.4, c/w HSS + Lantus  3. HTN: controlled, c/w Nimodipine and cozaar  4. UTI: pt got a course of rocephin.  5. GI PPX on protonix and DVT on SCD     Pt to go to acute rehab. Plan discussed and agreed with Dr. Motta. 75 y/o F w/ PMH of Fibromyalgia (on Lyrica 75 mg and Aleve), HTN (on ASA 81mg and Losartin 100/25), and DM2 (on Meformin 850mg BID) p/w AMS, slurred speech and weakness. CT head negative, s/p tPA and admitted to ICU for monitoring.     1.Right MCA infarct and small frontal SAH: likely 2/2 uncontrolled HTN   -NIHSS score 8, s/p tPA and ICU monitoring   -Repeat CT head: probable evolving acute infarct in the right frontal white matter/corona radiata. New acute sulcal subarachnoid hemorrhage in the right frontal region.   -Carotid US b/l:  Right carotid system wnl. Left carotid system: Arteriosclerotic plaque. Carotid artery corresponding to 50-69% degree of stenosis.    -CTA Neck: >70% stenosis in the left carotid artery and 50-69% stenosis in the right carotid artery. --> vascular consulted, outpatient follow up for left carotid endarterectomy after discharge.  -MRI head: Acute right MCA territorial infarction without significant change. Small right frontal subarachnoid hemorrhage without significant change  -KEDAR showed no evidence of thrombus  -MRA showed atherosclerotic disease of the right MCA artery  -c/w Aspirin, Lipitor and Nimodipine  -c/w regular diet as patient does not have dysphagia     2. DM: moderately controlled, HbA1c 7.4, c/w HSS + Lantus  3. HTN: controlled, c/w Nimodipine and cozaar  4. UTI: pt got a course of rocephin.  5. GI PPX on protonix and DVT on SCD     Pt to go for home PT. Plan discussed and agreed with Dr. Motta.

## 2017-01-18 NOTE — DISCHARGE NOTE ADULT - NS AS DC STROKE ED MATERIALS
Need for Followup After Discharge/Stroke Education Booklet/Call 911 for Stroke/Stroke Warning Signs and Symptoms/Risk Factors for Stroke/Prescribed Medications

## 2017-01-18 NOTE — DISCHARGE NOTE ADULT - MEDICATION SUMMARY - MEDICATIONS TO STOP TAKING
I will STOP taking the medications listed below when I get home from the hospital:    losartan-hydroCHLOROthiazide 100mg-25mg oral tablet  -- 1 tab(s) by mouth once a day    naproxen 250 mg oral tablet  -- 1 tab(s) by mouth 2 times a day, As Needed    Lyrica 75 mg oral capsule  -- 1 cap(s) by mouth once a day, As Needed    metFORMIN 850 mg oral tablet  -- 1 tab(s) by mouth 2 times a day (with meals) I will STOP taking the medications listed below when I get home from the hospital:    losartan-hydroCHLOROthiazide 100mg-25mg oral tablet  -- 1 tab(s) by mouth once a day    naproxen 250 mg oral tablet  -- 1 tab(s) by mouth 2 times a day, As Needed    Lyrica 75 mg oral capsule  -- 1 cap(s) by mouth once a day, As Needed

## 2017-01-18 NOTE — DISCHARGE NOTE ADULT - CARE PROVIDERS DIRECT ADDRESSES
,[email or web address],DirectAddress_Unknown

## 2017-01-18 NOTE — DISCHARGE NOTE ADULT - NS AS DC FOLLOWUP STROKE INST
Influenza vaccination (VIS Pub Date: August 19, 2014)/Stroke (includes: TIA/SAH/ICH/Ischemic Stroke) Smoking Cessation/Stroke (includes: TIA/SAH/ICH/Ischemic Stroke)/Influenza vaccination (VIS Pub Date: August 19, 2014)

## 2017-01-18 NOTE — DISCHARGE NOTE ADULT - MEDICATION SUMMARY - MEDICATIONS TO TAKE
I will START or STAY ON the medications listed below when I get home from the hospital:    aspirin 81 mg oral tablet  -- 1 tab(s) by mouth once a day  -- Indication: For Cerebral infarction    acetaminophen 325 mg oral tablet  -- 2 tab(s) by mouth every 6 hours, As needed, Mild Pain (1 - 3)  -- Indication: For Fibromyalgia    losartan 25 mg oral tablet  -- 1 tab(s) by mouth once a day  -- Indication: For Essential hypertension    insulin glargine  -- 15 unit(s) subcutaneous once a day (at bedtime)  -- Indication: For Type 2 diabetes mellitus    insulin lispro 100 units/mL subcutaneous solution  --  subcutaneous 3 times a day (before meals); 1 Unit(s) if Glucose 151 - 200  2 Unit(s) if Glucose 201 - 250  3 Unit(s) if Glucose 251 - 300  4 Unit(s) if Glucose 301 - 350  5 Unit(s) if Glucose 351 - 400  6 Unit(s) if Glucose GREATER THAN 400  -- Indication: For Type 2 diabetes mellitus    insulin lispro 100 units/mL subcutaneous solution  -- 4 unit(s) subcutaneous 3 times a day (before meals)  -- Indication: For Type 2 diabetes mellitus    ondansetron 2 mg/mL injectable solution  -- 4 milligram(s) injectable every 6 hours, As Needed  -- Indication: For Nausea/vomiting    atorvastatin 40 mg oral tablet  -- 1 tab(s) by mouth once a day (at bedtime)  -- Indication: For Cerebral infarction    niMODipine 30 mg oral capsule  -- 2 cap(s) by mouth every 4 hours  -- Indication: For Subarachnoid hemorrhage    lidocaine 5% topical film  -- Apply on skin to affected area once a day  -- Indication: For Pain    pantoprazole 40 mg oral delayed release tablet  -- 1 tab(s) by mouth once a day (before a meal)  -- Indication: For Need for prophylactic measure I will START or STAY ON the medications listed below when I get home from the hospital:    Rolling walker  -- Indication: For Walking assistance    acetaminophen 325 mg oral tablet  -- 2 tab(s) by mouth every 6 hours, As needed, Mild Pain (1 - 3)  -- Indication: For Pain    aspirin 81 mg oral tablet  -- 1 tab(s) by mouth once a day  -- Indication: For CVA    losartan 25 mg oral tablet  -- 1 tab(s) by mouth once a day  -- Indication: For HTN    metFORMIN 850 mg oral tablet  -- 1 tab(s) by mouth 2 times a day (with meals)  -- Indication: For Diabetes mellitus    atorvastatin 40 mg oral tablet  -- 1 tab(s) by mouth once a day (at bedtime)  -- Indication: For CVA    niMODipine 30 mg oral capsule  -- 2 cap(s) by mouth every 4 hours  -- Indication: For Subarachnoid hemorrhage    lidocaine 5% topical film  -- Apply on skin to affected area once a day  -- Indication: For Pain    pantoprazole 40 mg oral delayed release tablet  -- 1 tab(s) by mouth once a day (before a meal)  -- Indication: For Prophylaxis

## 2017-01-18 NOTE — DISCHARGE NOTE ADULT - CARE PLAN
Principal Discharge DX:	Cerebrovascular accident (CVA), unspecified mechanism  Goal:	Go to rehab, follow up with outpatient Vascular surgeon  Instructions for follow-up, activity and diet:	Please continue to take aspirin and lipitor. Please go to acute rehab and follow up with their recommendations. Please follow up with vascular surgeon Dr. Brown for left carotid endarterectomy as outpatient for the stenosis in your left carotid artery. Please follow up with your primary physician to tell him of what has transpired while you were hospitalized. Please follow up with neurologist Dr. Wilson in 2 weeks for a follow up visit.  Secondary Diagnosis:	Essential hypertension  Goal:	Control blood pressure  Instructions for follow-up, activity and diet:	Please continue to take nimodipine until Feb 4, 2017. Please continue to take losartan. After Feb 4th, have a follow up visit with Dr. Abebe who will assess your blood pressure off of the nimodipine and adjust your blood pressure medications.  Secondary Diagnosis:	Type 2 diabetes mellitus  Goal:	Control blood sugar  Instructions for follow-up, activity and diet:	Your blood sugar was well controlled in the hospital on Lantus and premeal humalog. We will recommend that for now while you are in acute rehab. Please follow up with their recommendations for post rehab diabetic regimen.  Secondary Diagnosis:	Fibromyalgia  Goal:	Control pain  Instructions for follow-up, activity and diet:	Please use tylenol and the lidoderm patch as pain control as prescribed. Follow up with your PCP for future pain med adjustments. Principal Discharge DX:	Cerebrovascular accident (CVA), unspecified mechanism  Goal:	Go to rehab, follow up with outpatient Vascular surgeon  Instructions for follow-up, activity and diet:	Please continue to take aspirin and lipitor. Please participate in home PT and follow up with their recommendations. Please follow up with vascular surgeon Dr. Brown for left carotid endarterectomy as outpatient for the stenosis in your left carotid artery. Please follow up with your primary physician to tell him of what has transpired while you were hospitalized. Please follow up with neurologist Dr. Wilson in 2 weeks for a follow up visit.  Secondary Diagnosis:	Essential hypertension  Goal:	Control blood pressure  Instructions for follow-up, activity and diet:	Please continue to take nimodipine until Feb 4, 2017. Please continue to take losartan. After Feb 4th, have a follow up visit with Dr. Abebe who will assess your blood pressure off of the nimodipine and adjust your blood pressure medications.  Secondary Diagnosis:	Type 2 diabetes mellitus  Goal:	Control blood sugar  Instructions for follow-up, activity and diet:	Your blood sugar was well controlled in the hospital on Lantus and premeal humalog. We will recommend that for now while you are in acute rehab. Please follow up with their recommendations for post rehab diabetic regimen.  Secondary Diagnosis:	Fibromyalgia  Goal:	Control pain  Instructions for follow-up, activity and diet:	Please use tylenol and the lidoderm patch as pain control as prescribed. Follow up with your PCP for future pain med adjustments. Principal Discharge DX:	Cerebrovascular accident (CVA), unspecified mechanism  Goal:	Go to rehab, follow up with outpatient Vascular surgeon  Instructions for follow-up, activity and diet:	Please continue to take aspirin and lipitor. Please participate in home PT and follow up with their recommendations. Please follow up with vascular surgeon Dr. Brown for left carotid endarterectomy as outpatient for the stenosis in your left carotid artery. Please follow up with your primary physician to tell him of what has transpired while you were hospitalized. Please follow up with neurologist Dr. Wilson in 2 weeks for a follow up visit.  Secondary Diagnosis:	Essential hypertension  Goal:	Control blood pressure  Instructions for follow-up, activity and diet:	Please continue to take nimodipine until Feb 4, 2017. Please continue to take losartan. After Feb 4th, have a follow up visit with Dr. Abebe who will assess your blood pressure off of the nimodipine and adjust your blood pressure medications.  Secondary Diagnosis:	Type 2 diabetes mellitus  Goal:	Control blood sugar  Instructions for follow-up, activity and diet:	Your blood sugar was well controlled with metformin. We will continue that medication for you.  Secondary Diagnosis:	Fibromyalgia  Goal:	Control pain  Instructions for follow-up, activity and diet:	Please use tylenol and the lidoderm patch as pain control as prescribed. Follow up with your PCP for future pain med adjustments.

## 2017-01-19 LAB
ANION GAP SERPL CALC-SCNC: 13 MMOL/L — SIGNIFICANT CHANGE UP (ref 5–17)
BASOPHILS # BLD AUTO: 0 K/UL — SIGNIFICANT CHANGE UP (ref 0–0.2)
BASOPHILS NFR BLD AUTO: 0.4 % — SIGNIFICANT CHANGE UP (ref 0–2)
BUN SERPL-MCNC: 43 MG/DL — HIGH (ref 7–18)
CALCIUM SERPL-MCNC: 8.1 MG/DL — LOW (ref 8.4–10.5)
CHLORIDE SERPL-SCNC: 105 MMOL/L — SIGNIFICANT CHANGE UP (ref 96–108)
CO2 SERPL-SCNC: 19 MMOL/L — LOW (ref 22–31)
CREAT SERPL-MCNC: 1.28 MG/DL — SIGNIFICANT CHANGE UP (ref 0.5–1.3)
EOSINOPHIL # BLD AUTO: 0 K/UL — SIGNIFICANT CHANGE UP (ref 0–0.5)
EOSINOPHIL NFR BLD AUTO: 0.3 % — SIGNIFICANT CHANGE UP (ref 0–6)
GLUCOSE SERPL-MCNC: 238 MG/DL — HIGH (ref 70–99)
HCT VFR BLD CALC: 34.5 % — SIGNIFICANT CHANGE UP (ref 34.5–45)
HGB BLD-MCNC: 12.2 G/DL — SIGNIFICANT CHANGE UP (ref 11.5–15.5)
LYMPHOCYTES # BLD AUTO: 0.5 K/UL — LOW (ref 1–3.3)
LYMPHOCYTES # BLD AUTO: 4.5 % — LOW (ref 13–44)
MCHC RBC-ENTMCNC: 29.4 PG — SIGNIFICANT CHANGE UP (ref 27–34)
MCHC RBC-ENTMCNC: 35.3 GM/DL — SIGNIFICANT CHANGE UP (ref 32–36)
MCV RBC AUTO: 83.4 FL — SIGNIFICANT CHANGE UP (ref 80–100)
MONOCYTES # BLD AUTO: 0.2 K/UL — SIGNIFICANT CHANGE UP (ref 0–0.9)
MONOCYTES NFR BLD AUTO: 2.3 % — SIGNIFICANT CHANGE UP (ref 2–14)
NEUTROPHILS # BLD AUTO: 9.7 K/UL — HIGH (ref 1.8–7.4)
NEUTROPHILS NFR BLD AUTO: 92.4 % — HIGH (ref 43–77)
OB PNL STL: POSITIVE
PLATELET # BLD AUTO: 333 K/UL — SIGNIFICANT CHANGE UP (ref 150–400)
POTASSIUM SERPL-MCNC: 4 MMOL/L — SIGNIFICANT CHANGE UP (ref 3.5–5.3)
POTASSIUM SERPL-SCNC: 4 MMOL/L — SIGNIFICANT CHANGE UP (ref 3.5–5.3)
RBC # BLD: 4.14 M/UL — SIGNIFICANT CHANGE UP (ref 3.8–5.2)
RBC # FLD: 14.2 % — SIGNIFICANT CHANGE UP (ref 10.3–14.5)
SODIUM SERPL-SCNC: 137 MMOL/L — SIGNIFICANT CHANGE UP (ref 135–145)
WBC # BLD: 10.5 K/UL — SIGNIFICANT CHANGE UP (ref 3.8–10.5)
WBC # FLD AUTO: 10.5 K/UL — SIGNIFICANT CHANGE UP (ref 3.8–10.5)

## 2017-01-19 PROCEDURE — 70498 CT ANGIOGRAPHY NECK: CPT | Mod: 26

## 2017-01-19 PROCEDURE — 70450 CT HEAD/BRAIN W/O DYE: CPT | Mod: 26

## 2017-01-19 RX ORDER — MORPHINE SULFATE 50 MG/1
2 CAPSULE, EXTENDED RELEASE ORAL ONCE
Qty: 0 | Refills: 0 | Status: DISCONTINUED | OUTPATIENT
Start: 2017-01-19 | End: 2017-01-19

## 2017-01-19 RX ORDER — SODIUM CHLORIDE 9 MG/ML
1000 INJECTION INTRAMUSCULAR; INTRAVENOUS; SUBCUTANEOUS
Qty: 0 | Refills: 0 | Status: DISCONTINUED | OUTPATIENT
Start: 2017-01-19 | End: 2017-01-20

## 2017-01-19 RX ORDER — CEFTRIAXONE 500 MG/1
INJECTION, POWDER, FOR SOLUTION INTRAMUSCULAR; INTRAVENOUS
Qty: 0 | Refills: 0 | Status: DISCONTINUED | OUTPATIENT
Start: 2017-01-19 | End: 2017-01-24

## 2017-01-19 RX ORDER — MORPHINE SULFATE 50 MG/1
1 CAPSULE, EXTENDED RELEASE ORAL ONCE
Qty: 0 | Refills: 0 | Status: DISCONTINUED | OUTPATIENT
Start: 2017-01-19 | End: 2017-01-19

## 2017-01-19 RX ORDER — CEFTRIAXONE 500 MG/1
1 INJECTION, POWDER, FOR SOLUTION INTRAMUSCULAR; INTRAVENOUS EVERY 24 HOURS
Qty: 0 | Refills: 0 | Status: DISCONTINUED | OUTPATIENT
Start: 2017-01-20 | End: 2017-01-24

## 2017-01-19 RX ORDER — CEFTRIAXONE 500 MG/1
1 INJECTION, POWDER, FOR SOLUTION INTRAMUSCULAR; INTRAVENOUS ONCE
Qty: 0 | Refills: 0 | Status: COMPLETED | OUTPATIENT
Start: 2017-01-19 | End: 2017-01-19

## 2017-01-19 RX ORDER — ONDANSETRON 8 MG/1
4 TABLET, FILM COATED ORAL ONCE
Qty: 0 | Refills: 0 | Status: DISCONTINUED | OUTPATIENT
Start: 2017-01-19 | End: 2017-01-26

## 2017-01-19 RX ADMIN — ATORVASTATIN CALCIUM 40 MILLIGRAM(S): 80 TABLET, FILM COATED ORAL at 21:26

## 2017-01-19 RX ADMIN — MORPHINE SULFATE 2 MILLIGRAM(S): 50 CAPSULE, EXTENDED RELEASE ORAL at 19:40

## 2017-01-19 RX ADMIN — SODIUM CHLORIDE 100 MILLILITER(S): 9 INJECTION INTRAMUSCULAR; INTRAVENOUS; SUBCUTANEOUS at 13:09

## 2017-01-19 RX ADMIN — LIDOCAINE 1 PATCH: 4 CREAM TOPICAL at 13:07

## 2017-01-19 RX ADMIN — Medication 1: at 16:36

## 2017-01-19 RX ADMIN — NIMODIPINE 60 MILLIGRAM(S): 60 SOLUTION ORAL at 06:29

## 2017-01-19 RX ADMIN — Medication 4 UNIT(S): at 13:06

## 2017-01-19 RX ADMIN — PANTOPRAZOLE SODIUM 40 MILLIGRAM(S): 20 TABLET, DELAYED RELEASE ORAL at 06:29

## 2017-01-19 RX ADMIN — CEFTRIAXONE 100 GRAM(S): 500 INJECTION, POWDER, FOR SOLUTION INTRAMUSCULAR; INTRAVENOUS at 01:35

## 2017-01-19 RX ADMIN — Medication 81 MILLIGRAM(S): at 13:07

## 2017-01-19 RX ADMIN — Medication 3: at 08:06

## 2017-01-19 RX ADMIN — NIMODIPINE 60 MILLIGRAM(S): 60 SOLUTION ORAL at 13:07

## 2017-01-19 RX ADMIN — NIMODIPINE 60 MILLIGRAM(S): 60 SOLUTION ORAL at 02:22

## 2017-01-19 RX ADMIN — LOSARTAN POTASSIUM 25 MILLIGRAM(S): 100 TABLET, FILM COATED ORAL at 06:29

## 2017-01-19 RX ADMIN — MORPHINE SULFATE 2 MILLIGRAM(S): 50 CAPSULE, EXTENDED RELEASE ORAL at 18:54

## 2017-01-19 RX ADMIN — INSULIN GLARGINE 15 UNIT(S): 100 INJECTION, SOLUTION SUBCUTANEOUS at 21:26

## 2017-01-19 RX ADMIN — NIMODIPINE 60 MILLIGRAM(S): 60 SOLUTION ORAL at 21:26

## 2017-01-19 RX ADMIN — LIDOCAINE 1 PATCH: 4 CREAM TOPICAL at 00:10

## 2017-01-19 RX ADMIN — Medication 4 UNIT(S): at 16:36

## 2017-01-19 RX ADMIN — Medication 4 UNIT(S): at 08:06

## 2017-01-19 RX ADMIN — NIMODIPINE 60 MILLIGRAM(S): 60 SOLUTION ORAL at 18:46

## 2017-01-20 LAB
ANION GAP SERPL CALC-SCNC: 10 MMOL/L — SIGNIFICANT CHANGE UP (ref 5–17)
BASOPHILS # BLD AUTO: 0.1 K/UL — SIGNIFICANT CHANGE UP (ref 0–0.2)
BASOPHILS NFR BLD AUTO: 0.8 % — SIGNIFICANT CHANGE UP (ref 0–2)
BUN SERPL-MCNC: 30 MG/DL — HIGH (ref 7–18)
CALCIUM SERPL-MCNC: 7.3 MG/DL — LOW (ref 8.4–10.5)
CHLORIDE SERPL-SCNC: 113 MMOL/L — HIGH (ref 96–108)
CO2 SERPL-SCNC: 19 MMOL/L — LOW (ref 22–31)
CREAT SERPL-MCNC: 0.87 MG/DL — SIGNIFICANT CHANGE UP (ref 0.5–1.3)
EOSINOPHIL # BLD AUTO: 0.2 K/UL — SIGNIFICANT CHANGE UP (ref 0–0.5)
EOSINOPHIL NFR BLD AUTO: 2.4 % — SIGNIFICANT CHANGE UP (ref 0–6)
GLUCOSE SERPL-MCNC: 127 MG/DL — HIGH (ref 70–99)
HCT VFR BLD CALC: 31.2 % — LOW (ref 34.5–45)
HGB BLD-MCNC: 10.7 G/DL — LOW (ref 11.5–15.5)
LYMPHOCYTES # BLD AUTO: 1.5 K/UL — SIGNIFICANT CHANGE UP (ref 1–3.3)
LYMPHOCYTES # BLD AUTO: 18.2 % — SIGNIFICANT CHANGE UP (ref 13–44)
MCHC RBC-ENTMCNC: 28.3 PG — SIGNIFICANT CHANGE UP (ref 27–34)
MCHC RBC-ENTMCNC: 34.2 GM/DL — SIGNIFICANT CHANGE UP (ref 32–36)
MCV RBC AUTO: 82.9 FL — SIGNIFICANT CHANGE UP (ref 80–100)
MONOCYTES # BLD AUTO: 0.7 K/UL — SIGNIFICANT CHANGE UP (ref 0–0.9)
MONOCYTES NFR BLD AUTO: 8.8 % — SIGNIFICANT CHANGE UP (ref 2–14)
NEUTROPHILS # BLD AUTO: 5.6 K/UL — SIGNIFICANT CHANGE UP (ref 1.8–7.4)
NEUTROPHILS NFR BLD AUTO: 69.8 % — SIGNIFICANT CHANGE UP (ref 43–77)
PLATELET # BLD AUTO: 244 K/UL — SIGNIFICANT CHANGE UP (ref 150–400)
POTASSIUM SERPL-MCNC: 3.9 MMOL/L — SIGNIFICANT CHANGE UP (ref 3.5–5.3)
POTASSIUM SERPL-SCNC: 3.9 MMOL/L — SIGNIFICANT CHANGE UP (ref 3.5–5.3)
RBC # BLD: 3.76 M/UL — LOW (ref 3.8–5.2)
RBC # FLD: 14.3 % — SIGNIFICANT CHANGE UP (ref 10.3–14.5)
SODIUM SERPL-SCNC: 142 MMOL/L — SIGNIFICANT CHANGE UP (ref 135–145)
WBC # BLD: 8.1 K/UL — SIGNIFICANT CHANGE UP (ref 3.8–10.5)
WBC # FLD AUTO: 8.1 K/UL — SIGNIFICANT CHANGE UP (ref 3.8–10.5)

## 2017-01-20 PROCEDURE — 99232 SBSQ HOSP IP/OBS MODERATE 35: CPT

## 2017-01-20 RX ORDER — LOPERAMIDE HCL 2 MG
2 TABLET ORAL ONCE
Qty: 0 | Refills: 0 | Status: COMPLETED | OUTPATIENT
Start: 2017-01-20 | End: 2017-01-20

## 2017-01-20 RX ORDER — ZALEPLON 10 MG
5 CAPSULE ORAL AT BEDTIME
Qty: 0 | Refills: 0 | Status: DISCONTINUED | OUTPATIENT
Start: 2017-01-20 | End: 2017-01-21

## 2017-01-20 RX ADMIN — SODIUM CHLORIDE 100 MILLILITER(S): 9 INJECTION INTRAMUSCULAR; INTRAVENOUS; SUBCUTANEOUS at 00:36

## 2017-01-20 RX ADMIN — Medication 1: at 08:17

## 2017-01-20 RX ADMIN — NIMODIPINE 60 MILLIGRAM(S): 60 SOLUTION ORAL at 06:45

## 2017-01-20 RX ADMIN — Medication 4 UNIT(S): at 12:09

## 2017-01-20 RX ADMIN — LOSARTAN POTASSIUM 25 MILLIGRAM(S): 100 TABLET, FILM COATED ORAL at 06:45

## 2017-01-20 RX ADMIN — PANTOPRAZOLE SODIUM 40 MILLIGRAM(S): 20 TABLET, DELAYED RELEASE ORAL at 06:45

## 2017-01-20 RX ADMIN — NIMODIPINE 60 MILLIGRAM(S): 60 SOLUTION ORAL at 21:42

## 2017-01-20 RX ADMIN — LIDOCAINE 1 PATCH: 4 CREAM TOPICAL at 00:45

## 2017-01-20 RX ADMIN — INSULIN GLARGINE 15 UNIT(S): 100 INJECTION, SOLUTION SUBCUTANEOUS at 21:41

## 2017-01-20 RX ADMIN — Medication 4 UNIT(S): at 08:17

## 2017-01-20 RX ADMIN — ATORVASTATIN CALCIUM 40 MILLIGRAM(S): 80 TABLET, FILM COATED ORAL at 21:42

## 2017-01-20 RX ADMIN — Medication 4 UNIT(S): at 17:34

## 2017-01-20 RX ADMIN — NIMODIPINE 60 MILLIGRAM(S): 60 SOLUTION ORAL at 02:25

## 2017-01-20 RX ADMIN — NIMODIPINE 60 MILLIGRAM(S): 60 SOLUTION ORAL at 17:35

## 2017-01-20 RX ADMIN — Medication 2 MILLIGRAM(S): at 21:42

## 2017-01-20 RX ADMIN — NIMODIPINE 60 MILLIGRAM(S): 60 SOLUTION ORAL at 12:09

## 2017-01-20 RX ADMIN — LIDOCAINE 1 PATCH: 4 CREAM TOPICAL at 12:10

## 2017-01-20 RX ADMIN — CEFTRIAXONE 100 GRAM(S): 500 INJECTION, POWDER, FOR SOLUTION INTRAMUSCULAR; INTRAVENOUS at 00:41

## 2017-01-20 RX ADMIN — Medication 81 MILLIGRAM(S): at 12:09

## 2017-01-20 NOTE — SWALLOW BEDSIDE ASSESSMENT ADULT - SLP PERTINENT HISTORY OF CURRENT PROBLEM
5 yo female, from home, ambulates w/o assistance, PMHx of Fibromyalgia (on Lyrica 75 mg and Aleve), HTN (on ASA 81mg and Losartin 100/25), and DM2 (on Meformin 850mg BID), BIBA presents to the ED for AMS, slurred speech and weakness today.

## 2017-01-20 NOTE — SWALLOW BEDSIDE ASSESSMENT ADULT - SWALLOW EVAL: RECOMMENDED FEEDING/EATING TECHNIQUES
no straws/allow for swallow between intakes/position upright (90 degrees)/alternate food with liquid/maintain upright posture during/after eating for 30 mins/oral hygiene/small sips/bites

## 2017-01-20 NOTE — SWALLOW BEDSIDE ASSESSMENT ADULT - COMMENTS
Pt Awake & alert. Son bedside. Followed simple directions w/minimal cues. Verbal, responded to simple questions appropriately. Initiated & maintained conversation. Pt does not like the food in the hospital. Fed self. Tended to talk w/food in mouth before swallowing. Tended to talk w/food in mouth before swallowing.

## 2017-01-20 NOTE — SWALLOW BEDSIDE ASSESSMENT ADULT - SWALLOW EVAL: DIAGNOSIS
Pt's oral prep, oral, & pharyngeal phases of swallow appear safe for regular consistencies & thin liquids. Fed self after food prep. Labial seal & spoon stripping WFL. Tongue movement, a-p transport, & oral transit WFL. Timely swallow & laryngeal elevation adequate. No overt s/s of aspiration noted. Pt tended to talk w/food in mouth before swallowing.

## 2017-01-20 NOTE — SWALLOW BEDSIDE ASSESSMENT ADULT - ASR SWALLOW ASPIRATION MONITOR
oral hygiene/change of breathing pattern/gurgly voice/cough/position upright (90Y)/fever/throat clearing/pneumonia/upper respiratory infection

## 2017-01-21 LAB
ANION GAP SERPL CALC-SCNC: 8 MMOL/L — SIGNIFICANT CHANGE UP (ref 5–17)
BASOPHILS # BLD AUTO: 0.1 K/UL — SIGNIFICANT CHANGE UP (ref 0–0.2)
BASOPHILS NFR BLD AUTO: 1.2 % — SIGNIFICANT CHANGE UP (ref 0–2)
BUN SERPL-MCNC: 18 MG/DL — SIGNIFICANT CHANGE UP (ref 7–18)
CALCIUM SERPL-MCNC: 8 MG/DL — LOW (ref 8.4–10.5)
CHLORIDE SERPL-SCNC: 114 MMOL/L — HIGH (ref 96–108)
CO2 SERPL-SCNC: 22 MMOL/L — SIGNIFICANT CHANGE UP (ref 22–31)
CREAT SERPL-MCNC: 0.78 MG/DL — SIGNIFICANT CHANGE UP (ref 0.5–1.3)
EOSINOPHIL # BLD AUTO: 1.2 K/UL — HIGH (ref 0–0.5)
EOSINOPHIL NFR BLD AUTO: 11.5 % — HIGH (ref 0–6)
GLUCOSE SERPL-MCNC: 101 MG/DL — HIGH (ref 70–99)
HCT VFR BLD CALC: 33.3 % — LOW (ref 34.5–45)
HGB BLD-MCNC: 11.4 G/DL — LOW (ref 11.5–15.5)
LYMPHOCYTES # BLD AUTO: 1.8 K/UL — SIGNIFICANT CHANGE UP (ref 1–3.3)
LYMPHOCYTES # BLD AUTO: 18.1 % — SIGNIFICANT CHANGE UP (ref 13–44)
MAGNESIUM SERPL-MCNC: 2.3 MG/DL — SIGNIFICANT CHANGE UP (ref 1.8–2.4)
MCHC RBC-ENTMCNC: 28.9 PG — SIGNIFICANT CHANGE UP (ref 27–34)
MCHC RBC-ENTMCNC: 34.3 GM/DL — SIGNIFICANT CHANGE UP (ref 32–36)
MCV RBC AUTO: 84.2 FL — SIGNIFICANT CHANGE UP (ref 80–100)
MONOCYTES # BLD AUTO: 0.7 K/UL — SIGNIFICANT CHANGE UP (ref 0–0.9)
MONOCYTES NFR BLD AUTO: 6.8 % — SIGNIFICANT CHANGE UP (ref 2–14)
NEUTROPHILS # BLD AUTO: 6.2 K/UL — SIGNIFICANT CHANGE UP (ref 1.8–7.4)
NEUTROPHILS NFR BLD AUTO: 62.4 % — SIGNIFICANT CHANGE UP (ref 43–77)
PHOSPHATE SERPL-MCNC: 1.8 MG/DL — LOW (ref 2.5–4.5)
PLATELET # BLD AUTO: 251 K/UL — SIGNIFICANT CHANGE UP (ref 150–400)
POTASSIUM SERPL-MCNC: 3.6 MMOL/L — SIGNIFICANT CHANGE UP (ref 3.5–5.3)
POTASSIUM SERPL-SCNC: 3.6 MMOL/L — SIGNIFICANT CHANGE UP (ref 3.5–5.3)
RBC # BLD: 3.95 M/UL — SIGNIFICANT CHANGE UP (ref 3.8–5.2)
RBC # FLD: 14.3 % — SIGNIFICANT CHANGE UP (ref 10.3–14.5)
SODIUM SERPL-SCNC: 144 MMOL/L — SIGNIFICANT CHANGE UP (ref 135–145)
WBC # BLD: 10 K/UL — SIGNIFICANT CHANGE UP (ref 3.8–10.5)
WBC # FLD AUTO: 10 K/UL — SIGNIFICANT CHANGE UP (ref 3.8–10.5)

## 2017-01-21 RX ADMIN — NIMODIPINE 60 MILLIGRAM(S): 60 SOLUTION ORAL at 01:27

## 2017-01-21 RX ADMIN — CEFTRIAXONE 100 GRAM(S): 500 INJECTION, POWDER, FOR SOLUTION INTRAMUSCULAR; INTRAVENOUS at 01:26

## 2017-01-21 RX ADMIN — LIDOCAINE 1 PATCH: 4 CREAM TOPICAL at 00:59

## 2017-01-21 RX ADMIN — Medication 1: at 17:10

## 2017-01-21 RX ADMIN — Medication 4 UNIT(S): at 12:06

## 2017-01-21 RX ADMIN — LOSARTAN POTASSIUM 25 MILLIGRAM(S): 100 TABLET, FILM COATED ORAL at 05:59

## 2017-01-21 RX ADMIN — NIMODIPINE 60 MILLIGRAM(S): 60 SOLUTION ORAL at 12:05

## 2017-01-21 RX ADMIN — Medication 2: at 12:06

## 2017-01-21 RX ADMIN — NIMODIPINE 60 MILLIGRAM(S): 60 SOLUTION ORAL at 06:00

## 2017-01-21 RX ADMIN — Medication 5 MILLIGRAM(S): at 00:32

## 2017-01-21 RX ADMIN — NIMODIPINE 60 MILLIGRAM(S): 60 SOLUTION ORAL at 17:10

## 2017-01-21 RX ADMIN — NIMODIPINE 60 MILLIGRAM(S): 60 SOLUTION ORAL at 14:43

## 2017-01-21 RX ADMIN — NIMODIPINE 60 MILLIGRAM(S): 60 SOLUTION ORAL at 22:06

## 2017-01-21 RX ADMIN — PANTOPRAZOLE SODIUM 40 MILLIGRAM(S): 20 TABLET, DELAYED RELEASE ORAL at 06:00

## 2017-01-21 RX ADMIN — ATORVASTATIN CALCIUM 40 MILLIGRAM(S): 80 TABLET, FILM COATED ORAL at 22:06

## 2017-01-21 RX ADMIN — Medication 81 MILLIGRAM(S): at 12:05

## 2017-01-21 RX ADMIN — INSULIN GLARGINE 15 UNIT(S): 100 INJECTION, SOLUTION SUBCUTANEOUS at 22:07

## 2017-01-21 RX ADMIN — LIDOCAINE 1 PATCH: 4 CREAM TOPICAL at 12:05

## 2017-01-21 RX ADMIN — Medication 4 UNIT(S): at 17:11

## 2017-01-22 LAB
ANION GAP SERPL CALC-SCNC: 5 MMOL/L — SIGNIFICANT CHANGE UP (ref 5–17)
BASOPHILS # BLD AUTO: 0.1 K/UL — SIGNIFICANT CHANGE UP (ref 0–0.2)
BASOPHILS NFR BLD AUTO: 1 % — SIGNIFICANT CHANGE UP (ref 0–2)
BUN SERPL-MCNC: 14 MG/DL — SIGNIFICANT CHANGE UP (ref 7–18)
CALCIUM SERPL-MCNC: 8.5 MG/DL — SIGNIFICANT CHANGE UP (ref 8.4–10.5)
CHLORIDE SERPL-SCNC: 111 MMOL/L — HIGH (ref 96–108)
CO2 SERPL-SCNC: 26 MMOL/L — SIGNIFICANT CHANGE UP (ref 22–31)
CREAT SERPL-MCNC: 0.72 MG/DL — SIGNIFICANT CHANGE UP (ref 0.5–1.3)
EOSINOPHIL # BLD AUTO: 1.3 K/UL — HIGH (ref 0–0.5)
EOSINOPHIL NFR BLD AUTO: 12.4 % — HIGH (ref 0–6)
GLUCOSE SERPL-MCNC: 112 MG/DL — HIGH (ref 70–99)
HCT VFR BLD CALC: 33.7 % — LOW (ref 34.5–45)
HGB BLD-MCNC: 11.8 G/DL — SIGNIFICANT CHANGE UP (ref 11.5–15.5)
LYMPHOCYTES # BLD AUTO: 1.9 K/UL — SIGNIFICANT CHANGE UP (ref 1–3.3)
LYMPHOCYTES # BLD AUTO: 18 % — SIGNIFICANT CHANGE UP (ref 13–44)
MCHC RBC-ENTMCNC: 29.3 PG — SIGNIFICANT CHANGE UP (ref 27–34)
MCHC RBC-ENTMCNC: 35.1 GM/DL — SIGNIFICANT CHANGE UP (ref 32–36)
MCV RBC AUTO: 83.5 FL — SIGNIFICANT CHANGE UP (ref 80–100)
MONOCYTES # BLD AUTO: 0.6 K/UL — SIGNIFICANT CHANGE UP (ref 0–0.9)
MONOCYTES NFR BLD AUTO: 5.7 % — SIGNIFICANT CHANGE UP (ref 2–14)
NEUTROPHILS # BLD AUTO: 6.6 K/UL — SIGNIFICANT CHANGE UP (ref 1.8–7.4)
NEUTROPHILS NFR BLD AUTO: 62.9 % — SIGNIFICANT CHANGE UP (ref 43–77)
PLATELET # BLD AUTO: 293 K/UL — SIGNIFICANT CHANGE UP (ref 150–400)
POTASSIUM SERPL-MCNC: 3.8 MMOL/L — SIGNIFICANT CHANGE UP (ref 3.5–5.3)
POTASSIUM SERPL-SCNC: 3.8 MMOL/L — SIGNIFICANT CHANGE UP (ref 3.5–5.3)
RBC # BLD: 4.03 M/UL — SIGNIFICANT CHANGE UP (ref 3.8–5.2)
RBC # FLD: 13.8 % — SIGNIFICANT CHANGE UP (ref 10.3–14.5)
SODIUM SERPL-SCNC: 142 MMOL/L — SIGNIFICANT CHANGE UP (ref 135–145)
WBC # BLD: 10.5 K/UL — SIGNIFICANT CHANGE UP (ref 3.8–10.5)
WBC # FLD AUTO: 10.5 K/UL — SIGNIFICANT CHANGE UP (ref 3.8–10.5)

## 2017-01-22 RX ADMIN — Medication 81 MILLIGRAM(S): at 11:27

## 2017-01-22 RX ADMIN — LOSARTAN POTASSIUM 25 MILLIGRAM(S): 100 TABLET, FILM COATED ORAL at 05:49

## 2017-01-22 RX ADMIN — Medication 1: at 17:13

## 2017-01-22 RX ADMIN — LIDOCAINE 1 PATCH: 4 CREAM TOPICAL at 11:27

## 2017-01-22 RX ADMIN — Medication 4 UNIT(S): at 11:29

## 2017-01-22 RX ADMIN — LIDOCAINE 1 PATCH: 4 CREAM TOPICAL at 23:53

## 2017-01-22 RX ADMIN — Medication 4 UNIT(S): at 08:31

## 2017-01-22 RX ADMIN — NIMODIPINE 60 MILLIGRAM(S): 60 SOLUTION ORAL at 06:41

## 2017-01-22 RX ADMIN — NIMODIPINE 60 MILLIGRAM(S): 60 SOLUTION ORAL at 21:54

## 2017-01-22 RX ADMIN — Medication 4 UNIT(S): at 17:13

## 2017-01-22 RX ADMIN — Medication 3: at 11:28

## 2017-01-22 RX ADMIN — INSULIN GLARGINE 15 UNIT(S): 100 INJECTION, SOLUTION SUBCUTANEOUS at 21:49

## 2017-01-22 RX ADMIN — NIMODIPINE 60 MILLIGRAM(S): 60 SOLUTION ORAL at 14:40

## 2017-01-22 RX ADMIN — LIDOCAINE 1 PATCH: 4 CREAM TOPICAL at 00:27

## 2017-01-22 RX ADMIN — ATORVASTATIN CALCIUM 40 MILLIGRAM(S): 80 TABLET, FILM COATED ORAL at 21:48

## 2017-01-22 RX ADMIN — CEFTRIAXONE 100 GRAM(S): 500 INJECTION, POWDER, FOR SOLUTION INTRAMUSCULAR; INTRAVENOUS at 23:48

## 2017-01-22 RX ADMIN — PANTOPRAZOLE SODIUM 40 MILLIGRAM(S): 20 TABLET, DELAYED RELEASE ORAL at 06:03

## 2017-01-22 RX ADMIN — CEFTRIAXONE 100 GRAM(S): 500 INJECTION, POWDER, FOR SOLUTION INTRAMUSCULAR; INTRAVENOUS at 01:21

## 2017-01-22 RX ADMIN — NIMODIPINE 60 MILLIGRAM(S): 60 SOLUTION ORAL at 01:20

## 2017-01-22 RX ADMIN — NIMODIPINE 60 MILLIGRAM(S): 60 SOLUTION ORAL at 17:13

## 2017-01-23 LAB
ANION GAP SERPL CALC-SCNC: 10 MMOL/L — SIGNIFICANT CHANGE UP (ref 5–17)
BASOPHILS # BLD AUTO: 0.1 K/UL — SIGNIFICANT CHANGE UP (ref 0–0.2)
BASOPHILS NFR BLD AUTO: 1.1 % — SIGNIFICANT CHANGE UP (ref 0–2)
BUN SERPL-MCNC: 17 MG/DL — SIGNIFICANT CHANGE UP (ref 7–18)
CALCIUM SERPL-MCNC: 8.7 MG/DL — SIGNIFICANT CHANGE UP (ref 8.4–10.5)
CHLORIDE SERPL-SCNC: 109 MMOL/L — HIGH (ref 96–108)
CO2 SERPL-SCNC: 24 MMOL/L — SIGNIFICANT CHANGE UP (ref 22–31)
CREAT SERPL-MCNC: 0.79 MG/DL — SIGNIFICANT CHANGE UP (ref 0.5–1.3)
EOSINOPHIL # BLD AUTO: 1.5 K/UL — HIGH (ref 0–0.5)
EOSINOPHIL NFR BLD AUTO: 13.1 % — HIGH (ref 0–6)
GLUCOSE SERPL-MCNC: 101 MG/DL — HIGH (ref 70–99)
HCT VFR BLD CALC: 33.9 % — LOW (ref 34.5–45)
HGB BLD-MCNC: 11.7 G/DL — SIGNIFICANT CHANGE UP (ref 11.5–15.5)
LYMPHOCYTES # BLD AUTO: 19.9 % — SIGNIFICANT CHANGE UP (ref 13–44)
LYMPHOCYTES # BLD AUTO: 2.3 K/UL — SIGNIFICANT CHANGE UP (ref 1–3.3)
MCHC RBC-ENTMCNC: 28.9 PG — SIGNIFICANT CHANGE UP (ref 27–34)
MCHC RBC-ENTMCNC: 34.4 GM/DL — SIGNIFICANT CHANGE UP (ref 32–36)
MCV RBC AUTO: 84 FL — SIGNIFICANT CHANGE UP (ref 80–100)
MONOCYTES # BLD AUTO: 0.9 K/UL — SIGNIFICANT CHANGE UP (ref 0–0.9)
MONOCYTES NFR BLD AUTO: 7.9 % — SIGNIFICANT CHANGE UP (ref 2–14)
NEUTROPHILS # BLD AUTO: 6.7 K/UL — SIGNIFICANT CHANGE UP (ref 1.8–7.4)
NEUTROPHILS NFR BLD AUTO: 57.9 % — SIGNIFICANT CHANGE UP (ref 43–77)
PLATELET # BLD AUTO: 307 K/UL — SIGNIFICANT CHANGE UP (ref 150–400)
POTASSIUM SERPL-MCNC: 3.8 MMOL/L — SIGNIFICANT CHANGE UP (ref 3.5–5.3)
POTASSIUM SERPL-SCNC: 3.8 MMOL/L — SIGNIFICANT CHANGE UP (ref 3.5–5.3)
RBC # BLD: 4.03 M/UL — SIGNIFICANT CHANGE UP (ref 3.8–5.2)
RBC # FLD: 14.4 % — SIGNIFICANT CHANGE UP (ref 10.3–14.5)
SODIUM SERPL-SCNC: 143 MMOL/L — SIGNIFICANT CHANGE UP (ref 135–145)
WBC # BLD: 11.5 K/UL — HIGH (ref 3.8–10.5)
WBC # FLD AUTO: 11.5 K/UL — HIGH (ref 3.8–10.5)

## 2017-01-23 PROCEDURE — 99231 SBSQ HOSP IP/OBS SF/LOW 25: CPT

## 2017-01-23 RX ADMIN — CEFTRIAXONE 100 GRAM(S): 500 INJECTION, POWDER, FOR SOLUTION INTRAMUSCULAR; INTRAVENOUS at 23:11

## 2017-01-23 RX ADMIN — Medication 81 MILLIGRAM(S): at 13:14

## 2017-01-23 RX ADMIN — INSULIN GLARGINE 15 UNIT(S): 100 INJECTION, SOLUTION SUBCUTANEOUS at 22:53

## 2017-01-23 RX ADMIN — LIDOCAINE 1 PATCH: 4 CREAM TOPICAL at 23:15

## 2017-01-23 RX ADMIN — NIMODIPINE 60 MILLIGRAM(S): 60 SOLUTION ORAL at 22:54

## 2017-01-23 RX ADMIN — ATORVASTATIN CALCIUM 40 MILLIGRAM(S): 80 TABLET, FILM COATED ORAL at 22:53

## 2017-01-23 RX ADMIN — NIMODIPINE 60 MILLIGRAM(S): 60 SOLUTION ORAL at 13:11

## 2017-01-23 RX ADMIN — Medication 1: at 13:12

## 2017-01-23 RX ADMIN — NIMODIPINE 60 MILLIGRAM(S): 60 SOLUTION ORAL at 09:50

## 2017-01-23 RX ADMIN — Medication 650 MILLIGRAM(S): at 10:25

## 2017-01-23 RX ADMIN — NIMODIPINE 60 MILLIGRAM(S): 60 SOLUTION ORAL at 17:57

## 2017-01-23 RX ADMIN — Medication 650 MILLIGRAM(S): at 09:52

## 2017-01-23 RX ADMIN — Medication 4 UNIT(S): at 13:12

## 2017-01-23 RX ADMIN — NIMODIPINE 60 MILLIGRAM(S): 60 SOLUTION ORAL at 02:00

## 2017-01-23 RX ADMIN — LOSARTAN POTASSIUM 25 MILLIGRAM(S): 100 TABLET, FILM COATED ORAL at 06:02

## 2017-01-23 RX ADMIN — Medication 4 UNIT(S): at 08:20

## 2017-01-23 RX ADMIN — Medication 4 UNIT(S): at 17:54

## 2017-01-23 RX ADMIN — NIMODIPINE 60 MILLIGRAM(S): 60 SOLUTION ORAL at 06:02

## 2017-01-23 RX ADMIN — LIDOCAINE 1 PATCH: 4 CREAM TOPICAL at 13:11

## 2017-01-23 RX ADMIN — PANTOPRAZOLE SODIUM 40 MILLIGRAM(S): 20 TABLET, DELAYED RELEASE ORAL at 06:02

## 2017-01-23 NOTE — DIETITIAN INITIAL EVALUATION ADULT. - OTHER INFO
nutrition assessment for length of stay; skin intact; lives home PTA; admitted for CVA, downgraded from ICU, s/p swallow evaluation with regular food, thin liquid recommended 1/20/17; denied GI distress, chewing or swallowing problem at present, no specific food choices obtained; 25 to 80% intake per flow sheets; concerns of low Na diet addressed, but pt not interested in more diet education/nutrition information at present, awaiting for transfer to rehab center.

## 2017-01-23 NOTE — DIETITIAN INITIAL EVALUATION ADULT. - NS AS NUTRI INTERV MEALS SNACK
Diets modified for specific foods and ingredients/change diet to Consistent CHO DASH diet regular consistency as medically feasible/Carbohydrate - modified diet/Composition of meals/snacks

## 2017-01-23 NOTE — DIETITIAN INITIAL EVALUATION ADULT. - NUTRITION INTERVENTION
Meals and Snack/Collaboration and Referral of Nutrition Care/Discharge and Transfer of Nutrition Care to New Setting/Nutrition Education/Feeding Assistance

## 2017-01-23 NOTE — DIETITIAN INITIAL EVALUATION ADULT. - PROBLEM SELECTOR PLAN 1
presented with sudden onset of unresponsiveness, aphasia/slurred speech, generalized weakness around 8:30am, EMS called and brought to the ED  -In ED code stroke called at 09:39 am. ED attending spoke to Dr. Wilson who agreed for TPA if no contraindications, CT head showed no acute findings, including no intracranial bleeding, tPA given at 10:20am.  -EKG: NSR @94bpm, RBBB  -Will keep SBP<180/105 s/p tPA.   -no aspirin and no chemical anticoagulation for 24 hours.   -will start on moderate intensity statin, lipitor 40 qhs.   -f/u lipid panel, cardiac enzymes (1 set) negative.   -Neuro checks s/p TPA q15 min for 1 hour f/b q30 min for 2 hours f/b q1hr for 4 hours and then q2.   -No IV lines, no blood draws for 24 hours.   -Rpt CT head if mental status or neuro check changes, or repeat CT head in 24 hours.   -Rpt labs after 24 hours  -f/u MRI brain, MRA head, echo, carotid doppler  -PT eval  -NPO for now until bedside swallow eval after 24 hours from tPA.   -Aspiration precaution, fall precaution.  -neurology dr. Wilson.

## 2017-01-23 NOTE — DIETITIAN INITIAL EVALUATION ADULT. - NS AS NUTRI INTERV DISCHARGE
Discharge and transfer to another nutrition professional/dietitian at skilled nursing facility when medically ready to be discharged

## 2017-01-24 LAB
ANION GAP SERPL CALC-SCNC: 10 MMOL/L — SIGNIFICANT CHANGE UP (ref 5–17)
BASOPHILS # BLD AUTO: 0.1 K/UL — SIGNIFICANT CHANGE UP (ref 0–0.2)
BASOPHILS NFR BLD AUTO: 1 % — SIGNIFICANT CHANGE UP (ref 0–2)
BUN SERPL-MCNC: 21 MG/DL — HIGH (ref 7–18)
CALCIUM SERPL-MCNC: 8.8 MG/DL — SIGNIFICANT CHANGE UP (ref 8.4–10.5)
CHLORIDE SERPL-SCNC: 110 MMOL/L — HIGH (ref 96–108)
CO2 SERPL-SCNC: 24 MMOL/L — SIGNIFICANT CHANGE UP (ref 22–31)
CREAT SERPL-MCNC: 0.88 MG/DL — SIGNIFICANT CHANGE UP (ref 0.5–1.3)
CULTURE RESULTS: SIGNIFICANT CHANGE UP
CULTURE RESULTS: SIGNIFICANT CHANGE UP
EOSINOPHIL # BLD AUTO: 1.2 K/UL — HIGH (ref 0–0.5)
EOSINOPHIL NFR BLD AUTO: 11 % — HIGH (ref 0–6)
GLUCOSE SERPL-MCNC: 115 MG/DL — HIGH (ref 70–99)
HCT VFR BLD CALC: 33.8 % — LOW (ref 34.5–45)
HGB BLD-MCNC: 11.6 G/DL — SIGNIFICANT CHANGE UP (ref 11.5–15.5)
LYMPHOCYTES # BLD AUTO: 19.7 % — SIGNIFICANT CHANGE UP (ref 13–44)
LYMPHOCYTES # BLD AUTO: 2.2 K/UL — SIGNIFICANT CHANGE UP (ref 1–3.3)
MCHC RBC-ENTMCNC: 28.3 PG — SIGNIFICANT CHANGE UP (ref 27–34)
MCHC RBC-ENTMCNC: 34.3 GM/DL — SIGNIFICANT CHANGE UP (ref 32–36)
MCV RBC AUTO: 82.3 FL — SIGNIFICANT CHANGE UP (ref 80–100)
MONOCYTES # BLD AUTO: 0.7 K/UL — SIGNIFICANT CHANGE UP (ref 0–0.9)
MONOCYTES NFR BLD AUTO: 6.6 % — SIGNIFICANT CHANGE UP (ref 2–14)
NEUTROPHILS # BLD AUTO: 6.9 K/UL — SIGNIFICANT CHANGE UP (ref 1.8–7.4)
NEUTROPHILS NFR BLD AUTO: 61.8 % — SIGNIFICANT CHANGE UP (ref 43–77)
PLATELET # BLD AUTO: 326 K/UL — SIGNIFICANT CHANGE UP (ref 150–400)
POTASSIUM SERPL-MCNC: 3.9 MMOL/L — SIGNIFICANT CHANGE UP (ref 3.5–5.3)
POTASSIUM SERPL-SCNC: 3.9 MMOL/L — SIGNIFICANT CHANGE UP (ref 3.5–5.3)
RBC # BLD: 4.1 M/UL — SIGNIFICANT CHANGE UP (ref 3.8–5.2)
RBC # FLD: 14.4 % — SIGNIFICANT CHANGE UP (ref 10.3–14.5)
SODIUM SERPL-SCNC: 144 MMOL/L — SIGNIFICANT CHANGE UP (ref 135–145)
SPECIMEN SOURCE: SIGNIFICANT CHANGE UP
SPECIMEN SOURCE: SIGNIFICANT CHANGE UP
WBC # BLD: 11.2 K/UL — HIGH (ref 3.8–10.5)
WBC # FLD AUTO: 11.2 K/UL — HIGH (ref 3.8–10.5)

## 2017-01-24 RX ORDER — ATORVASTATIN CALCIUM 80 MG/1
1 TABLET, FILM COATED ORAL
Qty: 0 | Refills: 0 | COMMUNITY
Start: 2017-01-24

## 2017-01-24 RX ORDER — INSULIN GLARGINE 100 [IU]/ML
15 INJECTION, SOLUTION SUBCUTANEOUS
Qty: 0 | Refills: 0 | COMMUNITY
Start: 2017-01-24

## 2017-01-24 RX ORDER — LIDOCAINE 4 G/100G
1 CREAM TOPICAL
Qty: 0 | Refills: 0 | COMMUNITY
Start: 2017-01-24

## 2017-01-24 RX ORDER — INSULIN LISPRO 100/ML
0 VIAL (ML) SUBCUTANEOUS
Qty: 0 | Refills: 0 | COMMUNITY
Start: 2017-01-24

## 2017-01-24 RX ORDER — ONDANSETRON 8 MG/1
4 TABLET, FILM COATED ORAL
Qty: 0 | Refills: 0 | COMMUNITY
Start: 2017-01-24

## 2017-01-24 RX ORDER — INSULIN LISPRO 100/ML
4 VIAL (ML) SUBCUTANEOUS
Qty: 0 | Refills: 0 | COMMUNITY
Start: 2017-01-24

## 2017-01-24 RX ORDER — LOSARTAN POTASSIUM 100 MG/1
1 TABLET, FILM COATED ORAL
Qty: 0 | Refills: 0 | COMMUNITY
Start: 2017-01-24

## 2017-01-24 RX ORDER — NIMODIPINE 60 MG/10ML
2 SOLUTION ORAL
Qty: 0 | Refills: 0 | COMMUNITY
Start: 2017-01-24

## 2017-01-24 RX ORDER — ACETAMINOPHEN 500 MG
2 TABLET ORAL
Qty: 0 | Refills: 0 | COMMUNITY
Start: 2017-01-24

## 2017-01-24 RX ORDER — PANTOPRAZOLE SODIUM 20 MG/1
1 TABLET, DELAYED RELEASE ORAL
Qty: 0 | Refills: 0 | COMMUNITY
Start: 2017-01-24

## 2017-01-24 RX ADMIN — NIMODIPINE 60 MILLIGRAM(S): 60 SOLUTION ORAL at 06:34

## 2017-01-24 RX ADMIN — Medication 4 UNIT(S): at 12:32

## 2017-01-24 RX ADMIN — NIMODIPINE 60 MILLIGRAM(S): 60 SOLUTION ORAL at 21:22

## 2017-01-24 RX ADMIN — Medication 4 UNIT(S): at 08:28

## 2017-01-24 RX ADMIN — Medication 1: at 12:32

## 2017-01-24 RX ADMIN — LIDOCAINE 1 PATCH: 4 CREAM TOPICAL at 12:33

## 2017-01-24 RX ADMIN — Medication 650 MILLIGRAM(S): at 03:25

## 2017-01-24 RX ADMIN — NIMODIPINE 60 MILLIGRAM(S): 60 SOLUTION ORAL at 18:08

## 2017-01-24 RX ADMIN — NIMODIPINE 60 MILLIGRAM(S): 60 SOLUTION ORAL at 09:09

## 2017-01-24 RX ADMIN — Medication 650 MILLIGRAM(S): at 09:08

## 2017-01-24 RX ADMIN — LOSARTAN POTASSIUM 25 MILLIGRAM(S): 100 TABLET, FILM COATED ORAL at 06:34

## 2017-01-24 RX ADMIN — INSULIN GLARGINE 15 UNIT(S): 100 INJECTION, SOLUTION SUBCUTANEOUS at 21:22

## 2017-01-24 RX ADMIN — ATORVASTATIN CALCIUM 40 MILLIGRAM(S): 80 TABLET, FILM COATED ORAL at 21:22

## 2017-01-24 RX ADMIN — Medication 4 UNIT(S): at 18:08

## 2017-01-24 RX ADMIN — Medication 650 MILLIGRAM(S): at 04:45

## 2017-01-24 RX ADMIN — NIMODIPINE 60 MILLIGRAM(S): 60 SOLUTION ORAL at 14:04

## 2017-01-24 RX ADMIN — Medication 1: at 18:08

## 2017-01-24 RX ADMIN — NIMODIPINE 60 MILLIGRAM(S): 60 SOLUTION ORAL at 03:20

## 2017-01-24 RX ADMIN — Medication 81 MILLIGRAM(S): at 12:33

## 2017-01-24 RX ADMIN — PANTOPRAZOLE SODIUM 40 MILLIGRAM(S): 20 TABLET, DELAYED RELEASE ORAL at 06:34

## 2017-01-25 LAB
ANION GAP SERPL CALC-SCNC: 10 MMOL/L — SIGNIFICANT CHANGE UP (ref 5–17)
BASOPHILS # BLD AUTO: 0.1 K/UL — SIGNIFICANT CHANGE UP (ref 0–0.2)
BASOPHILS # BLD AUTO: 0.1 K/UL — SIGNIFICANT CHANGE UP (ref 0–0.2)
BASOPHILS NFR BLD AUTO: 1.1 % — SIGNIFICANT CHANGE UP (ref 0–2)
BASOPHILS NFR BLD AUTO: 1.1 % — SIGNIFICANT CHANGE UP (ref 0–2)
BUN SERPL-MCNC: 21 MG/DL — HIGH (ref 7–18)
CALCIUM SERPL-MCNC: 9.1 MG/DL — SIGNIFICANT CHANGE UP (ref 8.4–10.5)
CHLORIDE SERPL-SCNC: 112 MMOL/L — HIGH (ref 96–108)
CO2 SERPL-SCNC: 24 MMOL/L — SIGNIFICANT CHANGE UP (ref 22–31)
CREAT SERPL-MCNC: 0.89 MG/DL — SIGNIFICANT CHANGE UP (ref 0.5–1.3)
EOSINOPHIL # BLD AUTO: 0.8 K/UL — HIGH (ref 0–0.5)
EOSINOPHIL # BLD AUTO: 1.2 K/UL — HIGH (ref 0–0.5)
EOSINOPHIL NFR BLD AUTO: 7.8 % — HIGH (ref 0–6)
EOSINOPHIL NFR BLD AUTO: 8.9 % — HIGH (ref 0–6)
GLUCOSE SERPL-MCNC: 120 MG/DL — HIGH (ref 70–99)
HCT VFR BLD CALC: 31.6 % — LOW (ref 34.5–45)
HCT VFR BLD CALC: 33.8 % — LOW (ref 34.5–45)
HGB BLD-MCNC: 10.7 G/DL — LOW (ref 11.5–15.5)
HGB BLD-MCNC: 12.2 G/DL — SIGNIFICANT CHANGE UP (ref 11.5–15.5)
LYMPHOCYTES # BLD AUTO: 17.2 % — SIGNIFICANT CHANGE UP (ref 13–44)
LYMPHOCYTES # BLD AUTO: 2.2 K/UL — SIGNIFICANT CHANGE UP (ref 1–3.3)
LYMPHOCYTES # BLD AUTO: 2.3 K/UL — SIGNIFICANT CHANGE UP (ref 1–3.3)
LYMPHOCYTES # BLD AUTO: 21.4 % — SIGNIFICANT CHANGE UP (ref 13–44)
MCHC RBC-ENTMCNC: 27.9 PG — SIGNIFICANT CHANGE UP (ref 27–34)
MCHC RBC-ENTMCNC: 29.9 PG — SIGNIFICANT CHANGE UP (ref 27–34)
MCHC RBC-ENTMCNC: 34 GM/DL — SIGNIFICANT CHANGE UP (ref 32–36)
MCHC RBC-ENTMCNC: 36.1 GM/DL — HIGH (ref 32–36)
MCV RBC AUTO: 82.1 FL — SIGNIFICANT CHANGE UP (ref 80–100)
MCV RBC AUTO: 82.9 FL — SIGNIFICANT CHANGE UP (ref 80–100)
MONOCYTES # BLD AUTO: 0.8 K/UL — SIGNIFICANT CHANGE UP (ref 0–0.9)
MONOCYTES # BLD AUTO: 0.8 K/UL — SIGNIFICANT CHANGE UP (ref 0–0.9)
MONOCYTES NFR BLD AUTO: 5.9 % — SIGNIFICANT CHANGE UP (ref 2–14)
MONOCYTES NFR BLD AUTO: 6.9 % — SIGNIFICANT CHANGE UP (ref 2–14)
NEUTROPHILS # BLD AUTO: 6.8 K/UL — SIGNIFICANT CHANGE UP (ref 1.8–7.4)
NEUTROPHILS # BLD AUTO: 8.7 K/UL — HIGH (ref 1.8–7.4)
NEUTROPHILS NFR BLD AUTO: 62.8 % — SIGNIFICANT CHANGE UP (ref 43–77)
NEUTROPHILS NFR BLD AUTO: 66.9 % — SIGNIFICANT CHANGE UP (ref 43–77)
PLATELET # BLD AUTO: 334 K/UL — SIGNIFICANT CHANGE UP (ref 150–400)
PLATELET # BLD AUTO: 354 K/UL — SIGNIFICANT CHANGE UP (ref 150–400)
POTASSIUM SERPL-MCNC: 4 MMOL/L — SIGNIFICANT CHANGE UP (ref 3.5–5.3)
POTASSIUM SERPL-SCNC: 4 MMOL/L — SIGNIFICANT CHANGE UP (ref 3.5–5.3)
RBC # BLD: 3.85 M/UL — SIGNIFICANT CHANGE UP (ref 3.8–5.2)
RBC # BLD: 4.08 M/UL — SIGNIFICANT CHANGE UP (ref 3.8–5.2)
RBC # FLD: 14.1 % — SIGNIFICANT CHANGE UP (ref 10.3–14.5)
RBC # FLD: 14.5 % — SIGNIFICANT CHANGE UP (ref 10.3–14.5)
SODIUM SERPL-SCNC: 146 MMOL/L — HIGH (ref 135–145)
WBC # BLD: 10.8 K/UL — HIGH (ref 3.8–10.5)
WBC # BLD: 12.9 K/UL — HIGH (ref 3.8–10.5)
WBC # FLD AUTO: 10.8 K/UL — HIGH (ref 3.8–10.5)
WBC # FLD AUTO: 12.9 K/UL — HIGH (ref 3.8–10.5)

## 2017-01-25 PROCEDURE — 99231 SBSQ HOSP IP/OBS SF/LOW 25: CPT

## 2017-01-25 RX ORDER — SODIUM CHLORIDE 9 MG/ML
1000 INJECTION INTRAMUSCULAR; INTRAVENOUS; SUBCUTANEOUS ONCE
Qty: 0 | Refills: 0 | Status: COMPLETED | OUTPATIENT
Start: 2017-01-25 | End: 2017-01-25

## 2017-01-25 RX ADMIN — PANTOPRAZOLE SODIUM 40 MILLIGRAM(S): 20 TABLET, DELAYED RELEASE ORAL at 05:15

## 2017-01-25 RX ADMIN — LIDOCAINE 1 PATCH: 4 CREAM TOPICAL at 11:38

## 2017-01-25 RX ADMIN — Medication 4 UNIT(S): at 11:37

## 2017-01-25 RX ADMIN — Medication 4 UNIT(S): at 07:59

## 2017-01-25 RX ADMIN — LOSARTAN POTASSIUM 25 MILLIGRAM(S): 100 TABLET, FILM COATED ORAL at 05:15

## 2017-01-25 RX ADMIN — NIMODIPINE 60 MILLIGRAM(S): 60 SOLUTION ORAL at 05:15

## 2017-01-25 RX ADMIN — NIMODIPINE 60 MILLIGRAM(S): 60 SOLUTION ORAL at 21:42

## 2017-01-25 RX ADMIN — Medication 650 MILLIGRAM(S): at 06:58

## 2017-01-25 RX ADMIN — NIMODIPINE 60 MILLIGRAM(S): 60 SOLUTION ORAL at 16:55

## 2017-01-25 RX ADMIN — INSULIN GLARGINE 15 UNIT(S): 100 INJECTION, SOLUTION SUBCUTANEOUS at 21:41

## 2017-01-25 RX ADMIN — NIMODIPINE 60 MILLIGRAM(S): 60 SOLUTION ORAL at 11:37

## 2017-01-25 RX ADMIN — Medication 4 UNIT(S): at 16:54

## 2017-01-25 RX ADMIN — LIDOCAINE 1 PATCH: 4 CREAM TOPICAL at 01:40

## 2017-01-25 RX ADMIN — ATORVASTATIN CALCIUM 40 MILLIGRAM(S): 80 TABLET, FILM COATED ORAL at 21:41

## 2017-01-25 RX ADMIN — Medication 81 MILLIGRAM(S): at 14:22

## 2017-01-25 RX ADMIN — NIMODIPINE 60 MILLIGRAM(S): 60 SOLUTION ORAL at 19:24

## 2017-01-25 RX ADMIN — Medication 650 MILLIGRAM(S): at 20:06

## 2017-01-25 RX ADMIN — Medication 650 MILLIGRAM(S): at 07:56

## 2017-01-25 RX ADMIN — Medication 1: at 11:36

## 2017-01-25 RX ADMIN — SODIUM CHLORIDE 2000 MILLILITER(S): 9 INJECTION INTRAMUSCULAR; INTRAVENOUS; SUBCUTANEOUS at 10:51

## 2017-01-25 RX ADMIN — NIMODIPINE 60 MILLIGRAM(S): 60 SOLUTION ORAL at 01:37

## 2017-01-26 VITALS
OXYGEN SATURATION: 100 % | DIASTOLIC BLOOD PRESSURE: 62 MMHG | SYSTOLIC BLOOD PRESSURE: 139 MMHG | TEMPERATURE: 98 F | HEART RATE: 76 BPM | RESPIRATION RATE: 16 BRPM

## 2017-01-26 LAB
ANION GAP SERPL CALC-SCNC: 9 MMOL/L — SIGNIFICANT CHANGE UP (ref 5–17)
BASOPHILS # BLD AUTO: 0.1 K/UL — SIGNIFICANT CHANGE UP (ref 0–0.2)
BASOPHILS NFR BLD AUTO: 1 % — SIGNIFICANT CHANGE UP (ref 0–2)
BUN SERPL-MCNC: 17 MG/DL — SIGNIFICANT CHANGE UP (ref 7–18)
CALCIUM SERPL-MCNC: 8.6 MG/DL — SIGNIFICANT CHANGE UP (ref 8.4–10.5)
CHLORIDE SERPL-SCNC: 112 MMOL/L — HIGH (ref 96–108)
CO2 SERPL-SCNC: 24 MMOL/L — SIGNIFICANT CHANGE UP (ref 22–31)
CREAT SERPL-MCNC: 0.75 MG/DL — SIGNIFICANT CHANGE UP (ref 0.5–1.3)
EOSINOPHIL # BLD AUTO: 1.3 K/UL — HIGH (ref 0–0.5)
EOSINOPHIL NFR BLD AUTO: 9.8 % — HIGH (ref 0–6)
GLUCOSE SERPL-MCNC: 122 MG/DL — HIGH (ref 70–99)
HCT VFR BLD CALC: 33.1 % — LOW (ref 34.5–45)
HGB BLD-MCNC: 11.5 G/DL — SIGNIFICANT CHANGE UP (ref 11.5–15.5)
LYMPHOCYTES # BLD AUTO: 16 % — SIGNIFICANT CHANGE UP (ref 13–44)
LYMPHOCYTES # BLD AUTO: 2.1 K/UL — SIGNIFICANT CHANGE UP (ref 1–3.3)
MCHC RBC-ENTMCNC: 29 PG — SIGNIFICANT CHANGE UP (ref 27–34)
MCHC RBC-ENTMCNC: 34.7 GM/DL — SIGNIFICANT CHANGE UP (ref 32–36)
MCV RBC AUTO: 83.5 FL — SIGNIFICANT CHANGE UP (ref 80–100)
MONOCYTES # BLD AUTO: 0.9 K/UL — SIGNIFICANT CHANGE UP (ref 0–0.9)
MONOCYTES NFR BLD AUTO: 6.7 % — SIGNIFICANT CHANGE UP (ref 2–14)
NEUTROPHILS # BLD AUTO: 8.7 K/UL — HIGH (ref 1.8–7.4)
NEUTROPHILS NFR BLD AUTO: 66.5 % — SIGNIFICANT CHANGE UP (ref 43–77)
PLATELET # BLD AUTO: 361 K/UL — SIGNIFICANT CHANGE UP (ref 150–400)
POTASSIUM SERPL-MCNC: 3.9 MMOL/L — SIGNIFICANT CHANGE UP (ref 3.5–5.3)
POTASSIUM SERPL-SCNC: 3.9 MMOL/L — SIGNIFICANT CHANGE UP (ref 3.5–5.3)
RBC # BLD: 3.97 M/UL — SIGNIFICANT CHANGE UP (ref 3.8–5.2)
RBC # FLD: 14.4 % — SIGNIFICANT CHANGE UP (ref 10.3–14.5)
SODIUM SERPL-SCNC: 145 MMOL/L — SIGNIFICANT CHANGE UP (ref 135–145)
WBC # BLD: 13.1 K/UL — HIGH (ref 3.8–10.5)
WBC # FLD AUTO: 13.1 K/UL — HIGH (ref 3.8–10.5)

## 2017-01-26 PROCEDURE — 93312 ECHO TRANSESOPHAGEAL: CPT

## 2017-01-26 PROCEDURE — 80061 LIPID PANEL: CPT

## 2017-01-26 PROCEDURE — 83036 HEMOGLOBIN GLYCOSYLATED A1C: CPT

## 2017-01-26 PROCEDURE — 93005 ELECTROCARDIOGRAM TRACING: CPT

## 2017-01-26 PROCEDURE — 92610 EVALUATE SWALLOWING FUNCTION: CPT | Mod: CH

## 2017-01-26 PROCEDURE — 93325 DOPPLER ECHO COLOR FLOW MAPG: CPT

## 2017-01-26 PROCEDURE — 84484 ASSAY OF TROPONIN QUANT: CPT

## 2017-01-26 PROCEDURE — 87040 BLOOD CULTURE FOR BACTERIA: CPT

## 2017-01-26 PROCEDURE — 93320 DOPPLER ECHO COMPLETE: CPT

## 2017-01-26 PROCEDURE — 80053 COMPREHEN METABOLIC PANEL: CPT

## 2017-01-26 PROCEDURE — 97530 THERAPEUTIC ACTIVITIES: CPT

## 2017-01-26 PROCEDURE — 70498 CT ANGIOGRAPHY NECK: CPT

## 2017-01-26 PROCEDURE — 80048 BASIC METABOLIC PNL TOTAL CA: CPT

## 2017-01-26 PROCEDURE — 84443 ASSAY THYROID STIM HORMONE: CPT

## 2017-01-26 PROCEDURE — 70551 MRI BRAIN STEM W/O DYE: CPT

## 2017-01-26 PROCEDURE — 85730 THROMBOPLASTIN TIME PARTIAL: CPT

## 2017-01-26 PROCEDURE — 97110 THERAPEUTIC EXERCISES: CPT

## 2017-01-26 PROCEDURE — 70450 CT HEAD/BRAIN W/O DYE: CPT

## 2017-01-26 PROCEDURE — 82272 OCCULT BLD FECES 1-3 TESTS: CPT

## 2017-01-26 PROCEDURE — 84100 ASSAY OF PHOSPHORUS: CPT

## 2017-01-26 PROCEDURE — 82550 ASSAY OF CK (CPK): CPT

## 2017-01-26 PROCEDURE — 93306 TTE W/DOPPLER COMPLETE: CPT

## 2017-01-26 PROCEDURE — 85027 COMPLETE CBC AUTOMATED: CPT

## 2017-01-26 PROCEDURE — 97162 PT EVAL MOD COMPLEX 30 MIN: CPT

## 2017-01-26 PROCEDURE — 71045 X-RAY EXAM CHEST 1 VIEW: CPT

## 2017-01-26 PROCEDURE — 81001 URINALYSIS AUTO W/SCOPE: CPT

## 2017-01-26 PROCEDURE — 80076 HEPATIC FUNCTION PANEL: CPT

## 2017-01-26 PROCEDURE — 93880 EXTRACRANIAL BILAT STUDY: CPT

## 2017-01-26 PROCEDURE — 70544 MR ANGIOGRAPHY HEAD W/O DYE: CPT

## 2017-01-26 PROCEDURE — 83735 ASSAY OF MAGNESIUM: CPT

## 2017-01-26 PROCEDURE — 82553 CREATINE MB FRACTION: CPT

## 2017-01-26 PROCEDURE — 85610 PROTHROMBIN TIME: CPT

## 2017-01-26 PROCEDURE — 99291 CRITICAL CARE FIRST HOUR: CPT | Mod: 25

## 2017-01-26 PROCEDURE — 94760 N-INVAS EAR/PLS OXIMETRY 1: CPT

## 2017-01-26 PROCEDURE — 97116 GAIT TRAINING THERAPY: CPT

## 2017-01-26 RX ORDER — PANTOPRAZOLE SODIUM 20 MG/1
1 TABLET, DELAYED RELEASE ORAL
Qty: 30 | Refills: 0 | OUTPATIENT
Start: 2017-01-26 | End: 2017-02-25

## 2017-01-26 RX ORDER — ACETAMINOPHEN 500 MG
2 TABLET ORAL
Qty: 100 | Refills: 0 | OUTPATIENT
Start: 2017-01-26

## 2017-01-26 RX ORDER — ASPIRIN/CALCIUM CARB/MAGNESIUM 324 MG
1 TABLET ORAL
Qty: 30 | Refills: 0 | OUTPATIENT
Start: 2017-01-26 | End: 2017-02-25

## 2017-01-26 RX ORDER — NIMODIPINE 60 MG/10ML
2 SOLUTION ORAL
Qty: 8 | Refills: 0 | OUTPATIENT
Start: 2017-01-26 | End: 2017-01-27

## 2017-01-26 RX ORDER — LOSARTAN POTASSIUM 100 MG/1
1 TABLET, FILM COATED ORAL
Qty: 30 | Refills: 0 | OUTPATIENT
Start: 2017-01-26 | End: 2017-02-25

## 2017-01-26 RX ORDER — LIDOCAINE 4 G/100G
1 CREAM TOPICAL
Qty: 30 | Refills: 0 | OUTPATIENT
Start: 2017-01-26 | End: 2017-02-25

## 2017-01-26 RX ORDER — ASPIRIN/CALCIUM CARB/MAGNESIUM 324 MG
1 TABLET ORAL
Qty: 30 | Refills: 0 | COMMUNITY
Start: 2017-01-26 | End: 2017-02-25

## 2017-01-26 RX ORDER — NIMODIPINE 60 MG/10ML
2 SOLUTION ORAL
Qty: 108 | Refills: 0 | OUTPATIENT
Start: 2017-01-26 | End: 2017-02-04

## 2017-01-26 RX ORDER — ATORVASTATIN CALCIUM 80 MG/1
1 TABLET, FILM COATED ORAL
Qty: 30 | Refills: 0 | OUTPATIENT
Start: 2017-01-26 | End: 2017-02-25

## 2017-01-26 RX ORDER — METFORMIN HYDROCHLORIDE 850 MG/1
1 TABLET ORAL
Qty: 60 | Refills: 0 | OUTPATIENT
Start: 2017-01-26 | End: 2017-02-25

## 2017-01-26 RX ADMIN — Medication 4 UNIT(S): at 12:14

## 2017-01-26 RX ADMIN — NIMODIPINE 60 MILLIGRAM(S): 60 SOLUTION ORAL at 06:06

## 2017-01-26 RX ADMIN — Medication 4 UNIT(S): at 08:27

## 2017-01-26 RX ADMIN — LIDOCAINE 1 PATCH: 4 CREAM TOPICAL at 03:57

## 2017-01-26 RX ADMIN — PANTOPRAZOLE SODIUM 40 MILLIGRAM(S): 20 TABLET, DELAYED RELEASE ORAL at 06:06

## 2017-01-26 RX ADMIN — Medication 650 MILLIGRAM(S): at 08:27

## 2017-01-26 RX ADMIN — Medication 650 MILLIGRAM(S): at 09:15

## 2017-01-26 RX ADMIN — Medication 81 MILLIGRAM(S): at 12:12

## 2017-01-26 RX ADMIN — NIMODIPINE 60 MILLIGRAM(S): 60 SOLUTION ORAL at 03:55

## 2017-01-26 RX ADMIN — NIMODIPINE 60 MILLIGRAM(S): 60 SOLUTION ORAL at 12:12

## 2017-01-26 RX ADMIN — LOSARTAN POTASSIUM 25 MILLIGRAM(S): 100 TABLET, FILM COATED ORAL at 06:06

## 2017-01-26 RX ADMIN — Medication 1: at 12:14

## 2017-01-31 DIAGNOSIS — E86.0 DEHYDRATION: ICD-10-CM

## 2017-01-31 DIAGNOSIS — E11.9 TYPE 2 DIABETES MELLITUS WITHOUT COMPLICATIONS: ICD-10-CM

## 2017-01-31 DIAGNOSIS — I10 ESSENTIAL (PRIMARY) HYPERTENSION: ICD-10-CM

## 2017-01-31 DIAGNOSIS — I60.9 NONTRAUMATIC SUBARACHNOID HEMORRHAGE, UNSPECIFIED: ICD-10-CM

## 2017-01-31 DIAGNOSIS — Z79.82 LONG TERM (CURRENT) USE OF ASPIRIN: ICD-10-CM

## 2017-01-31 DIAGNOSIS — M79.7 FIBROMYALGIA: ICD-10-CM

## 2017-01-31 DIAGNOSIS — I63.9 CEREBRAL INFARCTION, UNSPECIFIED: ICD-10-CM

## 2017-01-31 DIAGNOSIS — E87.0 HYPEROSMOLALITY AND HYPERNATREMIA: ICD-10-CM

## 2017-01-31 DIAGNOSIS — I65.23 OCCLUSION AND STENOSIS OF BILATERAL CAROTID ARTERIES: ICD-10-CM

## 2017-01-31 DIAGNOSIS — R33.9 RETENTION OF URINE, UNSPECIFIED: ICD-10-CM

## 2017-01-31 DIAGNOSIS — N39.0 URINARY TRACT INFECTION, SITE NOT SPECIFIED: ICD-10-CM

## 2017-05-10 ENCOUNTER — INPATIENT (INPATIENT)
Facility: HOSPITAL | Age: 77
LOS: 13 days | Discharge: HOME | DRG: 37 | End: 2017-05-24
Attending: INTERNAL MEDICINE | Admitting: INTERNAL MEDICINE
Payer: COMMERCIAL

## 2017-05-10 VITALS
DIASTOLIC BLOOD PRESSURE: 77 MMHG | WEIGHT: 160.06 LBS | SYSTOLIC BLOOD PRESSURE: 118 MMHG | HEART RATE: 80 BPM | TEMPERATURE: 98 F | OXYGEN SATURATION: 98 % | RESPIRATION RATE: 18 BRPM

## 2017-05-10 DIAGNOSIS — Z90.49 ACQUIRED ABSENCE OF OTHER SPECIFIED PARTS OF DIGESTIVE TRACT: Chronic | ICD-10-CM

## 2017-05-10 LAB
BASOPHILS # BLD AUTO: 0.1 K/UL — SIGNIFICANT CHANGE UP (ref 0–0.2)
BASOPHILS NFR BLD AUTO: 1.3 % — SIGNIFICANT CHANGE UP (ref 0–2)
EOSINOPHIL # BLD AUTO: 0.2 K/UL — SIGNIFICANT CHANGE UP (ref 0–0.5)
EOSINOPHIL NFR BLD AUTO: 2.9 % — SIGNIFICANT CHANGE UP (ref 0–6)
HCT VFR BLD CALC: 32.8 % — LOW (ref 34.5–45)
HGB BLD-MCNC: 11.3 G/DL — LOW (ref 11.5–15.5)
LYMPHOCYTES # BLD AUTO: 1.4 K/UL — SIGNIFICANT CHANGE UP (ref 1–3.3)
LYMPHOCYTES # BLD AUTO: 17 % — SIGNIFICANT CHANGE UP (ref 13–44)
MCHC RBC-ENTMCNC: 28.6 PG — SIGNIFICANT CHANGE UP (ref 27–34)
MCHC RBC-ENTMCNC: 34.4 GM/DL — SIGNIFICANT CHANGE UP (ref 32–36)
MCV RBC AUTO: 83 FL — SIGNIFICANT CHANGE UP (ref 80–100)
MONOCYTES # BLD AUTO: 0.7 K/UL — SIGNIFICANT CHANGE UP (ref 0–0.9)
MONOCYTES NFR BLD AUTO: 8.6 % — SIGNIFICANT CHANGE UP (ref 2–14)
NEUTROPHILS # BLD AUTO: 6 K/UL — SIGNIFICANT CHANGE UP (ref 1.8–7.4)
NEUTROPHILS NFR BLD AUTO: 70.3 % — SIGNIFICANT CHANGE UP (ref 43–77)
PLATELET # BLD AUTO: 346 K/UL — SIGNIFICANT CHANGE UP (ref 150–400)
RBC # BLD: 3.95 M/UL — SIGNIFICANT CHANGE UP (ref 3.8–5.2)
RBC # FLD: 13.7 % — SIGNIFICANT CHANGE UP (ref 10.3–14.5)
WBC # BLD: 8.5 K/UL — SIGNIFICANT CHANGE UP (ref 3.8–10.5)
WBC # FLD AUTO: 8.5 K/UL — SIGNIFICANT CHANGE UP (ref 3.8–10.5)

## 2017-05-10 PROCEDURE — 70450 CT HEAD/BRAIN W/O DYE: CPT | Mod: 26

## 2017-05-10 NOTE — ED PROVIDER NOTE - NS ED MD SCRIBE ATTENDING SCRIBE SECTIONS
HIV/PAST MEDICAL/SURGICAL/SOCIAL HISTORY/DISPOSITION/PHYSICAL EXAM/REVIEW OF SYSTEMS/VITAL SIGNS( Pullset)/HISTORY OF PRESENT ILLNESS

## 2017-05-10 NOTE — ED PROVIDER NOTE - OBJECTIVE STATEMENT
77 y/o female with PMHx of DM, HTN, fibromyalgia, CVA (in Jan with no residual deficits) presents to the ED for tongue heaviness since 21:50 today. As per son, pt states when he was talking to pt he noticed pt with tongue heaviness and talking funny. Pt immediately called 911, by the time he made it to the ED, Sx resoled and currently pt is asymptomatic in the ED. Pt otherwise denies fever, chills, CP, SOB, or any other complaints. NKDA.

## 2017-05-10 NOTE — ED ADULT TRIAGE NOTE - CHIEF COMPLAINT QUOTE
C/O LEFT FACIAL DROOP X 5 MINUTES AND WAS RESOLVED ITSELF AS PER PATIENT HAPPENED AT 9;50 PM DENIES ANY WEAKNESS

## 2017-05-11 DIAGNOSIS — G45.9 TRANSIENT CEREBRAL ISCHEMIC ATTACK, UNSPECIFIED: ICD-10-CM

## 2017-05-11 DIAGNOSIS — I10 ESSENTIAL (PRIMARY) HYPERTENSION: ICD-10-CM

## 2017-05-11 DIAGNOSIS — Z29.9 ENCOUNTER FOR PROPHYLACTIC MEASURES, UNSPECIFIED: ICD-10-CM

## 2017-05-11 DIAGNOSIS — I65.22 OCCLUSION AND STENOSIS OF LEFT CAROTID ARTERY: ICD-10-CM

## 2017-05-11 DIAGNOSIS — E87.1 HYPO-OSMOLALITY AND HYPONATREMIA: ICD-10-CM

## 2017-05-11 DIAGNOSIS — M79.7 FIBROMYALGIA: ICD-10-CM

## 2017-05-11 DIAGNOSIS — E11.9 TYPE 2 DIABETES MELLITUS WITHOUT COMPLICATIONS: ICD-10-CM

## 2017-05-11 LAB
24R-OH-CALCIDIOL SERPL-MCNC: 30.3 NG/ML — SIGNIFICANT CHANGE UP (ref 30–100)
ALBUMIN SERPL ELPH-MCNC: 3.5 G/DL — SIGNIFICANT CHANGE UP (ref 3.5–5)
ALP SERPL-CCNC: 114 U/L — SIGNIFICANT CHANGE UP (ref 40–120)
ALT FLD-CCNC: 41 U/L DA — SIGNIFICANT CHANGE UP (ref 10–60)
ANION GAP SERPL CALC-SCNC: 9 MMOL/L — SIGNIFICANT CHANGE UP (ref 5–17)
ANION GAP SERPL CALC-SCNC: 9 MMOL/L — SIGNIFICANT CHANGE UP (ref 5–17)
APPEARANCE UR: CLEAR — SIGNIFICANT CHANGE UP
AST SERPL-CCNC: 45 U/L — HIGH (ref 10–40)
BASOPHILS # BLD AUTO: 0.1 K/UL — SIGNIFICANT CHANGE UP (ref 0–0.2)
BASOPHILS NFR BLD AUTO: 0.7 % — SIGNIFICANT CHANGE UP (ref 0–2)
BILIRUB SERPL-MCNC: 0.6 MG/DL — SIGNIFICANT CHANGE UP (ref 0.2–1.2)
BILIRUB UR-MCNC: NEGATIVE — SIGNIFICANT CHANGE UP
BUN SERPL-MCNC: 14 MG/DL — SIGNIFICANT CHANGE UP (ref 7–18)
BUN SERPL-MCNC: 18 MG/DL — SIGNIFICANT CHANGE UP (ref 7–18)
CALCIUM SERPL-MCNC: 8.6 MG/DL — SIGNIFICANT CHANGE UP (ref 8.4–10.5)
CALCIUM SERPL-MCNC: 8.9 MG/DL — SIGNIFICANT CHANGE UP (ref 8.4–10.5)
CHLORIDE SERPL-SCNC: 87 MMOL/L — LOW (ref 96–108)
CHLORIDE SERPL-SCNC: 90 MMOL/L — LOW (ref 96–108)
CHOLEST SERPL-MCNC: 129 MG/DL — SIGNIFICANT CHANGE UP (ref 10–199)
CK MB BLD-MCNC: 1.8 % — SIGNIFICANT CHANGE UP (ref 0–3.5)
CK MB CFR SERPL CALC: 2.7 NG/ML — SIGNIFICANT CHANGE UP (ref 0–3.6)
CK MB CFR SERPL CALC: 5 NG/ML — HIGH (ref 0–3.6)
CK SERPL-CCNC: 147 U/L — SIGNIFICANT CHANGE UP (ref 21–215)
CO2 SERPL-SCNC: 26 MMOL/L — SIGNIFICANT CHANGE UP (ref 22–31)
CO2 SERPL-SCNC: 27 MMOL/L — SIGNIFICANT CHANGE UP (ref 22–31)
COLOR SPEC: YELLOW — SIGNIFICANT CHANGE UP
CREAT SERPL-MCNC: 0.83 MG/DL — SIGNIFICANT CHANGE UP (ref 0.5–1.3)
CREAT SERPL-MCNC: 1.06 MG/DL — SIGNIFICANT CHANGE UP (ref 0.5–1.3)
DIFF PNL FLD: NEGATIVE — SIGNIFICANT CHANGE UP
EOSINOPHIL # BLD AUTO: 0.2 K/UL — SIGNIFICANT CHANGE UP (ref 0–0.5)
EOSINOPHIL NFR BLD AUTO: 2.1 % — SIGNIFICANT CHANGE UP (ref 0–6)
FOLATE SERPL-MCNC: >20 NG/ML — SIGNIFICANT CHANGE UP (ref 4.8–24.2)
GLUCOSE SERPL-MCNC: 116 MG/DL — HIGH (ref 70–99)
GLUCOSE SERPL-MCNC: 147 MG/DL — HIGH (ref 70–99)
GLUCOSE UR QL: NEGATIVE — SIGNIFICANT CHANGE UP
HBA1C BLD-MCNC: 7.3 % — HIGH (ref 4–5.6)
HCT VFR BLD CALC: 31.6 % — LOW (ref 34.5–45)
HDLC SERPL-MCNC: 78 MG/DL — SIGNIFICANT CHANGE UP (ref 40–125)
HGB BLD-MCNC: 11 G/DL — LOW (ref 11.5–15.5)
KETONES UR-MCNC: NEGATIVE — SIGNIFICANT CHANGE UP
LEUKOCYTE ESTERASE UR-ACNC: ABNORMAL
LIPID PNL WITH DIRECT LDL SERPL: 42 MG/DL — SIGNIFICANT CHANGE UP
LYMPHOCYTES # BLD AUTO: 1.5 K/UL — SIGNIFICANT CHANGE UP (ref 1–3.3)
LYMPHOCYTES # BLD AUTO: 15.7 % — SIGNIFICANT CHANGE UP (ref 13–44)
MAGNESIUM SERPL-MCNC: 1.9 MG/DL — SIGNIFICANT CHANGE UP (ref 1.8–2.4)
MCHC RBC-ENTMCNC: 29.1 PG — SIGNIFICANT CHANGE UP (ref 27–34)
MCHC RBC-ENTMCNC: 34.9 GM/DL — SIGNIFICANT CHANGE UP (ref 32–36)
MCV RBC AUTO: 83.4 FL — SIGNIFICANT CHANGE UP (ref 80–100)
MONOCYTES # BLD AUTO: 0.9 K/UL — SIGNIFICANT CHANGE UP (ref 0–0.9)
MONOCYTES NFR BLD AUTO: 9.3 % — SIGNIFICANT CHANGE UP (ref 2–14)
NEUTROPHILS # BLD AUTO: 6.8 K/UL — SIGNIFICANT CHANGE UP (ref 1.8–7.4)
NEUTROPHILS NFR BLD AUTO: 72.2 % — SIGNIFICANT CHANGE UP (ref 43–77)
NITRITE UR-MCNC: NEGATIVE — SIGNIFICANT CHANGE UP
OSMOLALITY SERPL: 259 MOS/KG — LOW (ref 275–300)
OSMOLALITY UR: 232 MOS/KG — SIGNIFICANT CHANGE UP (ref 50–1200)
PH UR: 7 — SIGNIFICANT CHANGE UP (ref 5–8)
PHOSPHATE SERPL-MCNC: 3.1 MG/DL — SIGNIFICANT CHANGE UP (ref 2.5–4.5)
PLATELET # BLD AUTO: 342 K/UL — SIGNIFICANT CHANGE UP (ref 150–400)
POTASSIUM SERPL-MCNC: 4 MMOL/L — SIGNIFICANT CHANGE UP (ref 3.5–5.3)
POTASSIUM SERPL-MCNC: 4.1 MMOL/L — SIGNIFICANT CHANGE UP (ref 3.5–5.3)
POTASSIUM SERPL-SCNC: 4 MMOL/L — SIGNIFICANT CHANGE UP (ref 3.5–5.3)
POTASSIUM SERPL-SCNC: 4.1 MMOL/L — SIGNIFICANT CHANGE UP (ref 3.5–5.3)
PROT SERPL-MCNC: 7.5 G/DL — SIGNIFICANT CHANGE UP (ref 6–8.3)
PROT UR-MCNC: NEGATIVE — SIGNIFICANT CHANGE UP
RBC # BLD: 3.8 M/UL — SIGNIFICANT CHANGE UP (ref 3.8–5.2)
RBC # FLD: 13.2 % — SIGNIFICANT CHANGE UP (ref 10.3–14.5)
SODIUM SERPL-SCNC: 123 MMOL/L — LOW (ref 135–145)
SODIUM SERPL-SCNC: 125 MMOL/L — LOW (ref 135–145)
SODIUM UR-SCNC: 41 MMOL/L — SIGNIFICANT CHANGE UP (ref 40–220)
SP GR SPEC: 1 — LOW (ref 1.01–1.02)
TOTAL CHOLESTEROL/HDL RATIO MEASUREMENT: 1.7 RATIO — LOW (ref 3.3–7.1)
TRIGL SERPL-MCNC: 45 MG/DL — SIGNIFICANT CHANGE UP (ref 10–149)
TROPONIN I SERPL-MCNC: 0.02 NG/ML — SIGNIFICANT CHANGE UP (ref 0–0.04)
TROPONIN I SERPL-MCNC: 0.03 NG/ML — SIGNIFICANT CHANGE UP (ref 0–0.04)
TSH SERPL-MCNC: 0.3 UU/ML — LOW (ref 0.34–4.82)
UROBILINOGEN FLD QL: NEGATIVE — SIGNIFICANT CHANGE UP
VIT B12 SERPL-MCNC: 597 PG/ML — SIGNIFICANT CHANGE UP (ref 243–894)
WBC # BLD: 9.5 K/UL — SIGNIFICANT CHANGE UP (ref 3.8–10.5)
WBC # FLD AUTO: 9.5 K/UL — SIGNIFICANT CHANGE UP (ref 3.8–10.5)

## 2017-05-11 PROCEDURE — 99285 EMERGENCY DEPT VISIT HI MDM: CPT

## 2017-05-11 PROCEDURE — 99232 SBSQ HOSP IP/OBS MODERATE 35: CPT | Mod: GC

## 2017-05-11 RX ORDER — ATORVASTATIN CALCIUM 80 MG/1
80 TABLET, FILM COATED ORAL ONCE
Qty: 0 | Refills: 0 | Status: COMPLETED | OUTPATIENT
Start: 2017-05-11 | End: 2017-05-11

## 2017-05-11 RX ORDER — INSULIN LISPRO 100/ML
VIAL (ML) SUBCUTANEOUS
Qty: 0 | Refills: 0 | Status: DISCONTINUED | OUTPATIENT
Start: 2017-05-11 | End: 2017-05-15

## 2017-05-11 RX ORDER — DEXTROSE 50 % IN WATER 50 %
1 SYRINGE (ML) INTRAVENOUS ONCE
Qty: 0 | Refills: 0 | Status: DISCONTINUED | OUTPATIENT
Start: 2017-05-11 | End: 2017-05-15

## 2017-05-11 RX ORDER — ATORVASTATIN CALCIUM 80 MG/1
40 TABLET, FILM COATED ORAL AT BEDTIME
Qty: 0 | Refills: 0 | Status: DISCONTINUED | OUTPATIENT
Start: 2017-05-11 | End: 2017-05-15

## 2017-05-11 RX ORDER — ASPIRIN/CALCIUM CARB/MAGNESIUM 324 MG
81 TABLET ORAL DAILY
Qty: 0 | Refills: 0 | Status: DISCONTINUED | OUTPATIENT
Start: 2017-05-11 | End: 2017-05-15

## 2017-05-11 RX ORDER — SODIUM CHLORIDE 9 MG/ML
1000 INJECTION INTRAMUSCULAR; INTRAVENOUS; SUBCUTANEOUS ONCE
Qty: 0 | Refills: 0 | Status: COMPLETED | OUTPATIENT
Start: 2017-05-11 | End: 2017-05-11

## 2017-05-11 RX ORDER — ACETAMINOPHEN 500 MG
650 TABLET ORAL EVERY 6 HOURS
Qty: 0 | Refills: 0 | Status: DISCONTINUED | OUTPATIENT
Start: 2017-05-11 | End: 2017-05-15

## 2017-05-11 RX ORDER — DEXTROSE 50 % IN WATER 50 %
25 SYRINGE (ML) INTRAVENOUS ONCE
Qty: 0 | Refills: 0 | Status: DISCONTINUED | OUTPATIENT
Start: 2017-05-11 | End: 2017-05-15

## 2017-05-11 RX ORDER — GLUCAGON INJECTION, SOLUTION 0.5 MG/.1ML
1 INJECTION, SOLUTION SUBCUTANEOUS ONCE
Qty: 0 | Refills: 0 | Status: DISCONTINUED | OUTPATIENT
Start: 2017-05-11 | End: 2017-05-15

## 2017-05-11 RX ORDER — HEPARIN SODIUM 5000 [USP'U]/ML
5000 INJECTION INTRAVENOUS; SUBCUTANEOUS EVERY 8 HOURS
Qty: 0 | Refills: 0 | Status: DISCONTINUED | OUTPATIENT
Start: 2017-05-11 | End: 2017-05-15

## 2017-05-11 RX ORDER — SODIUM CHLORIDE 9 MG/ML
1000 INJECTION, SOLUTION INTRAVENOUS
Qty: 0 | Refills: 0 | Status: DISCONTINUED | OUTPATIENT
Start: 2017-05-11 | End: 2017-05-15

## 2017-05-11 RX ORDER — PANTOPRAZOLE SODIUM 20 MG/1
40 TABLET, DELAYED RELEASE ORAL
Qty: 0 | Refills: 0 | Status: DISCONTINUED | OUTPATIENT
Start: 2017-05-11 | End: 2017-05-11

## 2017-05-11 RX ORDER — DEXTROSE 50 % IN WATER 50 %
12.5 SYRINGE (ML) INTRAVENOUS ONCE
Qty: 0 | Refills: 0 | Status: DISCONTINUED | OUTPATIENT
Start: 2017-05-11 | End: 2017-05-15

## 2017-05-11 RX ORDER — ASPIRIN/CALCIUM CARB/MAGNESIUM 324 MG
325 TABLET ORAL ONCE
Qty: 0 | Refills: 0 | Status: COMPLETED | OUTPATIENT
Start: 2017-05-11 | End: 2017-05-11

## 2017-05-11 RX ADMIN — SODIUM CHLORIDE 1000 MILLILITER(S): 9 INJECTION INTRAMUSCULAR; INTRAVENOUS; SUBCUTANEOUS at 01:00

## 2017-05-11 RX ADMIN — Medication 325 MILLIGRAM(S): at 00:59

## 2017-05-11 RX ADMIN — ATORVASTATIN CALCIUM 80 MILLIGRAM(S): 80 TABLET, FILM COATED ORAL at 00:59

## 2017-05-11 RX ADMIN — HEPARIN SODIUM 5000 UNIT(S): 5000 INJECTION INTRAVENOUS; SUBCUTANEOUS at 15:14

## 2017-05-11 RX ADMIN — ATORVASTATIN CALCIUM 40 MILLIGRAM(S): 80 TABLET, FILM COATED ORAL at 21:53

## 2017-05-11 RX ADMIN — HEPARIN SODIUM 5000 UNIT(S): 5000 INJECTION INTRAVENOUS; SUBCUTANEOUS at 21:53

## 2017-05-11 RX ADMIN — HEPARIN SODIUM 5000 UNIT(S): 5000 INJECTION INTRAVENOUS; SUBCUTANEOUS at 06:04

## 2017-05-11 RX ADMIN — Medication 75 MILLIGRAM(S): at 17:21

## 2017-05-11 RX ADMIN — PANTOPRAZOLE SODIUM 40 MILLIGRAM(S): 20 TABLET, DELAYED RELEASE ORAL at 06:04

## 2017-05-11 RX ADMIN — Medication 81 MILLIGRAM(S): at 12:55

## 2017-05-11 RX ADMIN — Medication 2: at 12:55

## 2017-05-11 NOTE — H&P ADULT - PROBLEM SELECTOR PLAN 2
Left sided > 70 % stenosis , no f./up after discharge   last time she was discharged on Nimodipine 30 mg 2 cap Q4 to continue for 9-10 days , with recommendation to follow with cardio, she took it for total of 16 days   Also son concerned for getting the lower extremity arterial doppler as she has pain in legs ( shins, mildly tender , pulses weak , no skin changes, some streaks of vessels)   Vascular consult --------------- Left sided > 70 % stenosis , no f./up after discharge   last time she was discharged on Nimodipine 30 mg 2 cap Q4 to continue for 9-10 days , with recommendation to follow with cardio, she took it for total of 16 days   Also son concerned for getting the lower extremity arterial doppler as she has pain in legs ( shins, mildly tender , pulses weak , no skin changes, some streaks of vessels)   Vascular consult Dr Dubois

## 2017-05-11 NOTE — H&P ADULT - PROBLEM SELECTOR PLAN 3
BP stable in ED , patient was last discharge on losartan only , but she restarted again as per recommendation of PMD   Hold anti HTN for permissive HTN x 48 hours, restart accordingly   Home meds losartan / -25 mg

## 2017-05-11 NOTE — CONSULT NOTE ADULT - SUBJECTIVE AND OBJECTIVE BOX
Time of visit: 1700    CHIEF COMPLAINT: Patient is a 76y old  Female who presents with a chief complaint of Brought by son for sudden onset of tongue heaviness, slurred speech since 9:00 PM (11 May 2017 02:56)      HPI:  75 y/o F, home, walks with assistance, lives with son, SCOTTOx3,  w/ PMH of CVA ( Right frontal MCA , s/p TPA in ), small SAH ( acute in Right frontal region, 2017)  Fibromyalgia,  HTN ( Losartan HCTz 100/25),  DM2 ( Metformin 750mg BID, last HBA1c is 7.3 ), presented with tongue heaviness, fatigue, since 9;00 pm of 5/10/17. History was provided by the son at the bedside, according to him she was sitting on the couch and talking to him, when suddenly he noticed that she is having slurred speech, mumbling, tongue heaviness. As per patient the whole day yesterday was very busy as she was doing the packing for her upcoming trip. She reports that she was feeling tired, and wanted to sleep. Denies any headache trauma to head, blurring of vision, neck pain, chest pain, SOB cough, fever, abdominal pain, urinary , bowel complains. Son said that it looks like she is about to pass out, but did not lost consciousness Patient reports having body aches, bilateral knee pain, leg pain, neck pain. As per son her symptoms of pain likely due to fibromyalgia and also due to high intensity statin.     Patient was last admitted to [facility] in . with CVA , got TPA , admitted to ICU for monitoring, it was acute right MCA , later was found to have small right frontal SAH, also has left sided carotid artery stenosis with > 70 %. She was recommended to follow up with Vascular Surgeon Dr Dubois for possible left end arterectomy, cardio f/up with Dr Guzman ( as she was started on nimodipine for SAH ) , and also with Dr Wilson. Patient did not follow up with anyone, except her PCP, also she recently lost her  about 15 days ago. Son expresses the concern regarding statin . Currently since last week she is taking 20 mg of lipitor.     Fhx: No family history of strokes, mother  at age of 73 due to heart attack (11 May 2017 02:56)   Patient seen and examined.     PAST MEDICAL & SURGICAL HISTORY:  Type 2 diabetes mellitus  Essential hypertension  DM (diabetes mellitus)  HTN (hypertension)  Fibromyalgia  History of appendectomy  No significant past surgical history      Allergies    No Known Allergies    Intolerances        MEDICATIONS  (STANDING):  aspirin  chewable 81milliGRAM(s) Oral daily  atorvastatin 40milliGRAM(s) Oral at bedtime  heparin  Injectable 5000Unit(s) SubCutaneous every 8 hours  insulin lispro (HumaLOG) corrective regimen sliding scale  SubCutaneous three times a day before meals  dextrose 5%. 1000milliLiter(s) IV Continuous <Continuous>  dextrose 50% Injectable 12.5Gram(s) IV Push once  dextrose 50% Injectable 25Gram(s) IV Push once  dextrose 50% Injectable 25Gram(s) IV Push once  pregabalin 75milliGRAM(s) Oral two times a day      MEDICATIONS  (PRN):  acetaminophen   Tablet. 650milliGRAM(s) Oral every 6 hours PRN Mild Pain (1 - 3)  dextrose Gel 1Dose(s) Oral once PRN Blood Glucose LESS THAN 70 milliGRAM(s)/deciLiter  glucagon  Injectable 1milliGRAM(s) IntraMuscular once PRN Glucose <70 milliGRAM(s)/deciLiter       Medications up to date at time of exam.    FAMILY HISTORY:  Family history of acute myocardial infarction  Family history of cerebrovascular accident (CVA) (Sibling)      SOCIAL HISTORY  Smoking History: [   ] smoking/smoke exposure, [   ] former smoker, [ x  ] denies smoking  Living Condition: [   ] apartment, [   ] private house  Work History:   Travel History: denies recent travel  Illicit Substance Use: denies  Alcohol Use: denies    REVIEW OF SYSTEMS:    CONSTITUTIONAL:  denies fevers, chills, sweats, weight loss    HEENT:  denies diplopia or blurred vision, sore throat or runny nose.    CARDIOVASCULAR:  denies pressure, squeezing, tightness, or heaviness about the chest; no palpitations.    RESPIRATORY:  denies SOB, cough, DAVIS, wheezing.    GASTROINTESTINAL:  denies abdominal pain, nausea, vomiting or diarrhea.    GENITOURINARY: denies dysuria, frequency or urgency.    NEUROLOGIC:  denies numbness, tingling, seizures or weakness.    PSYCHIATRIC:  denies disorder of thought or mood.    MSK: denies swelling, redness      PHYSICAL EXAMINATION:    GENERAL: The patient is a well-developed, well-nourished, in no apparent distress.     Vital Signs Last 24 Hrs  T(C): 36.3, Max: 37 ( @ 11:16)  T(F): 97.3, Max: 98.6 ( @ 11:16)  HR: 80 (72 - 90)  BP: 111/56 (111/56 - 152/82)  BP(mean): --  RR: 189 (16 - 189)  SpO2: 99% (98% - 100%)    HEENT: head is normocephalic and atraumatic. mucous membranes are moist.     NECK: supple, no palpable adenopathy.    LUNGS: clear to auscultation, no wheezing, rales, or rhonchi.    HEART: regular rate and rhythm, + murmur.    ABDOMEN: soft, nontender, and nondistended.     EXTREMITIES: without any cyanosis, clubbing, rash, lesions + B/L LE edema.    NEUROLOGIC: awake, alert, oriented.     SKIN: warm, dry, good turgor.      LABS:                        11.0   9.5   )-----------( 342      ( 11 May 2017 05:57 )             31.6         125<L>  |  90<L>  |  14  ----------------------------<  116<H>  4.0   |  26  |  0.83    Ca    8.6      11 May 2017 05:57  Phos  3.1       Mg     1.9         TPro  7.5  /  Alb  3.5  /  TBili  0.6  /  DBili  x   /  AST  45<H>  /  ALT  41  /  AlkPhos  114  11      Urinalysis Basic - ( 11 May 2017 17:16 )    Color: Yellow / Appearance: Clear / S.005 / pH: x  Gluc: x / Ketone: Negative  / Bili: Negative / Urobili: Negative   Blood: x / Protein: Negative / Nitrite: Negative   Leuk Esterase: Trace / RBC: 0-2 /HPF / WBC 3-5 /HPF   Sq Epi: x / Non Sq Epi: Few / Bacteria: Trace /HPF        CARDIAC MARKERS ( 11 May 2017 05:57 )  0.022 ng/mL / x     / 147 U/L / x     / 2.7 ng/mL  CARDIAC MARKERS ( 10 May 2017 23:46 )  0.025 ng/mL / x     / x     / x     / 5.0 ng/mL      Troponin 0.022 05-11 @ 05:57   05-11 @ 05:57  CKMB -- 05-11 @ 05:57  Troponin 0.025 05-10 @ 23:46  CK -- 05-10 @ 23:46  CKMB -- 05-10 @ 23:46  .    MICROBIOLOGY: (if applicable)    RADIOLOGY & ADDITIONAL STUDIES:  EKG: SR w/ RBBB  CXR:  ECHO:   TELE:  CT Head 5/10/17: No acute intracranial hemorrhage, mass effect, or CT evidence of an acute   transcortical infarct.    Moderate chronic ischemic changes in the frontoparietal white matter. Old   right corona radiata, bilateral thalamic, and left occipital cortical   infarcts.    KEDAR 2017: 1. Normal mitral valve. Mild-moderate mitral regurgitation.  2. Calcified trileaflet aortic valve with decreased  opening. Mild aortic stenosis. Mild-moderate aortic  regurgitation.  3. Simple atheroma (sessile plaque protruding 1.0-3.9mm  into lumen) noted in aortic arch/descending aorta.  4. Normal left atrium.  No left atrium or left atrial  appendage thrombus.  5. Normal left ventricular internal dimensions and wall  thicknesses.  6. Normal Left Ventricular Systolic Function,  (EF = 55 to  60%)  7. Normal right atrium.  8. Normal right ventricular size and function.  9. There is trace tricuspid regurgitation.  10. Normal pulmonic valve.  11. Contrast injection demonstrates no evidence of a patent  foramen ovale.  12. No intracardiac thrombus or masses documented.  There  is an intra-atrial septal aneurysm noted.      IMPRESSION: 76y Female PAST MEDICAL & SURGICAL HISTORY:    DM (diabetes mellitus)  HTN (hypertension)  Fibromyalgia  History of appendectomy    p/w slurred speech, mumbling, tongue heaviness due to TIA, patient with known R carotid artery stenosis > 70%    RECOMMENDATIONS:     - recommend B/L carotid dopplers    - f/u with vascular for B/L LE leg pain, son concerned   - MRI head pending   - permissive HTN as per neurology.   - no acute events on tele

## 2017-05-11 NOTE — H&P ADULT - HISTORY OF PRESENT ILLNESS
75 y/o F, home, walks with assistance, lives with son, AAOx3,  w/ PMH of CVA ( Right frontal MCA , s/p TPA in ), small SAH ( acute in Right frontal region, 2017)  Fibromyalgia,  HTN ( Losartan HCTz 100/25),  DM2 ( Metformin 750mg BID, last HBA1c is 7.3 ), presented with tongue heaviness, fatigue, since 9;00 pm of 5/10/17. History was provided by the son at the bedside, according to him she was sitting on the couch and talking to him, when suddenly he noticed that she is having slurred speech, mumbling, tongue heaviness. As per patient the whole day yesterday was very busy as she was doing the packing for her upcoming trip. She reports that she was feeling tired, and wanted to sleep. Denies any headache trauma to head, blurring of vision, neck pain, chest pain, SOB cough, fever, abdominal pain, urinary , bowel complains. Son said that it looks like she is about to pass out, but did not lost consciousness Patient reports having body aches, bilateral knee pain, leg pain, neck pain. As per son her symptoms of pain likely due to fibromyalgia and also due to high intensity statin.     Patient was last admitted to [facility] in . with CVA , got TPA , admitted to ICU for monitoring, it was acute right MCA , later was found to have small right frontal SAH, also has left sided carotid artery stenosis with > 70 %. She was recommended to follow up with Vascular Surgeon Dr Dubois for possible left end arterectomy, cardio f/up with Dr Guzman ( as she was started on nimodipine for SAH ) , and also with Dr Wilson. Patient did not follow up with anyone, except her PCP, also she recently lost her  about 15 days ago. Son expresses the concern regarding statin . Currently since last week she is taking 20 mg of lipitor.     Fhx: No family history of strokes, mother  at age of 73 due to heart attack

## 2017-05-11 NOTE — H&P ADULT - PROBLEM SELECTOR PLAN 5
Hyponatremia, corrected is 125   Symptoms of fatigue , tired, muscle pain could be due to that  Patient is euvolemic , could be due to HCTz, SIADH ( prior CVA) .  Will check the serum osmolality, urine Na & osmolality

## 2017-05-11 NOTE — H&P ADULT - PROBLEM SELECTOR PLAN 1
Sudden onset of symptoms at 9:00 pm , with tongue heaviness, feeling tired   Risk factors HTN, DM, left carotid artery stenosis, prior CVA ( no residual weakness)  Symptoms resolved in ED , NIHS stroke scale is 0  CT head no acute changes , chronic changes present   Got aspirin 325, Lipitor 80 mg , 1 L NS bolus.   EKG NSR at 88 bpm , RBBB .   Troponin x 1 negative , f/up on T2,  telemetry x 24 hours   Permissive HTN x 48 hours , restart the BP meds after that   Passed the bedside swallow, no need to keep NPO   Continue with aspirin, statin., f/up on routine labs   Last admission to [facility] at 2017 with AMS, found to have Right MCA infarct, got TPA, s/p ICU monitoring ( Got all tests at that time)   MRI : Acute right MCA territorial infarcts without significant change. Small right frontal subarachnoid hemorrhage without significant change  MRA: Patent vessels of the [place] and major branches without  evidence of aneurysm or hemodynamically significant stenosis. Diffuse irregularity of the right middle cerebral artery M1 and M2  segments, likely related to atherosclerotic disease, without abrupt  cutoff.   CT angioL:70% stenosis in the left carotid artery and 50-69% stenosis in the right carotid artery. recommended to follow with  vascular Dr Dubois for left carotid endarterectomy after discharge.  but did not follow.   Carotid Doppler: Right carotid system wnl. Left carotid system: Arteriosclerotic plaque.  TTE : EF 65%, mild moderate mitral regurgitation/ aortic stenosis, moderate aortic regurgitation, mild tricuspid regurgitation, grade 2 DD, mild pulmonary HTN    KEDAR : No PFO, no thrombus, atheroma (sessile plaque protruding 1.0-3.9mm  into lumen) noted in aortic arch/descending aorta, small intra atrial septal defect noted.   Neuro  consult : Dr Wilson   PT evaluation to follow

## 2017-05-11 NOTE — H&P ADULT - NSHPLABSRESULTS_GEN_ALL_CORE
11.3   8.5   )-----------( 346      ( 10 May 2017 23:46 )             32.8   05-10    123<L>  |  87<L>  |  18  ----------------------------<  147<H>  4.1   |  27  |  1.06    Ca    8.9      10 May 2017 23:46    CARDIAC MARKERS ( 10 May 2017 23:46 )  0.025 ng/mL / x     / x     / x     / 5.0 ng/mL

## 2017-05-11 NOTE — H&P ADULT - ASSESSMENT
75 y/o F, home, walks with assistance, lives with son, AAOx3,  w/ PMH of CVA ( Right frontal MCA , s/p TPA in Jan2017), small SAH ( acute in Right frontal region, jan 2017)  Fibromyalgia,  HTN ( Losartan HCTz 100/25),  DM2 ( Metformin 750mg BID, last HBA1c is 7.3 ), presented with tongue heaviness, fatigue, since 9;00 pm of 5/10/17. Patient symptoms resolved in ED , just complaining of mild tiredness and wanted to sleep. Admitted for TIA.     In ED vitals signs stable, BP normal, Fingerstick is 197, CT head shows No acute intracranial hemorrhage, mass effect, or CT evidence of an acute  transcortical infarct. Moderate chronic ischemic changes in the frontoparietal white matter. Old  right corona radiata, bilateral thalamic, and left occipital cortical  infarcts. Got aspirin 325, lipitor 80 mg , 1 L NS bolus. EKG NSR at 88 bpm , RBB . Labs showed Hb of 11.3, Troponin x 1 negative Na of 123 , Glucose 147, corrected 125.

## 2017-05-11 NOTE — CONSULT NOTE ADULT - SUBJECTIVE AND OBJECTIVE BOX
75 y/o F, home, walks with assistance  w/ PMH of CVA ( Right frontal MCA , s/p TPA in Jan2017), small SAH ( acute in Right frontal region, Jan 2017)  Fibromyalgia,  HTN ( Losartan HCTz 100/25),  DM2, presented with tongue heaviness, fatigue, since 9;00 pm of 5/10/17. History was provided by the son at the bedside, according to him she was sitting on the couch and talking to him, when suddenly he noticed that she is having slurred speech, mumbling, tongue heaviness. he reports that she was feeling tired, and wanted to sleep. Denies any headache trauma to head, blurring of vision, neck pain, chest pain, SOB cough, fever, abdominal pain, urinary , bowel complains. Son said that it looks like she is about to pass out, but did not lost consciousness Patient reports having body aches, bilateral knee pain, leg pain, neck pain. As per son her symptoms of pain likely due to fibromyalgia and also due to high intensity statin.     Patient was last admitted to [facility] in Jan. with CVA , got TPA , admitted to ICU for monitoring, it was acute right MCA , later was found to have small right frontal SAH, also has left sided carotid artery stenosis with > 70 %. She was recommended to follow up with Vascular Surgeon Dr Dubois for possible left endarterectomy but never did.    Now she is currently admitted for a TIA. is currently on ASA and has no complaints or symptoms    Vital Signs Last 24 Hrs  T(C): 36.4, Max: 36.6 (05-10 @ 22:38)  T(F): 97.6, Max: 97.8 (05-10 @ 22:38)  HR: 90 (72 - 90)  BP: 152/59 (118/77 - 152/82)  BP(mean): --  RR: 16 (16 - 18)  SpO2: 100% (98% - 100%)    General: A+Ox3, NAD  HEENT: EOMI, PERRLA, no tounge deviation  ext: warm, pluses equal b/l, 2+ 77 y/o F, home, walks with assistance  w/ PMH of CVA ( Right frontal MCA , s/p TPA in Jan2017), small SAH ( acute in Right frontal region, Jan 2017)  Fibromyalgia,  HTN ( Losartan HCTz 100/25),  DM2, presented with tongue heaviness, fatigue, since 9;00 pm of 5/10/17. History was provided by the son at the bedside, according to him she was sitting on the couch and talking to him, when suddenly he noticed that she is having slurred speech, mumbling, tongue heaviness. he reports that she was feeling tired, and wanted to sleep. Denies any headache trauma to head, blurring of vision, neck pain, chest pain, SOB cough, fever, abdominal pain, urinary , bowel complains. Son said that it looks like she is about to pass out, but did not lost consciousness Patient reports having body aches, bilateral knee pain, leg pain, neck pain. As per son her symptoms of pain likely due to fibromyalgia and also due to high intensity statin.     Patient was last admitted to [facility] in Jan. with CVA , got TPA , admitted to ICU for monitoring, it was acute right MCA , later was found to have small right frontal SAH, also has left sided carotid artery stenosis with > 70 %. She was recommended to follow up with Vascular Surgeon Dr Dubois for possible left endarterectomy but never did.    Now she is currently admitted for a TIA. is currently on ASA and has no complaints or symptoms    Vital Signs Last 24 Hrs  T(C): 36.4, Max: 36.6 (05-10 @ 22:38)  T(F): 97.6, Max: 97.8 (05-10 @ 22:38)  HR: 90 (72 - 90)  BP: 152/59 (118/77 - 152/82)  BP(mean): --  RR: 16 (16 - 18)  SpO2: 100% (98% - 100%)    General: A+Ox3, NAD  HEENT: EOMI, PERRLA, no tounge deviation  ext: warm, pluses equal b/l, 2+    CTA: Jan 2017: 70% stenosis of the LICA  no new imaging available                          11.0   9.5   )-----------( 342      ( 11 May 2017 05:57 )             31.6   05-11    125<L>  |  90<L>  |  14  ----------------------------<  116<H>  4.0   |  26  |  0.83    Ca    8.6      11 May 2017 05:57  Phos  3.1     05-11  Mg     1.9     05-11    TPro  7.5  /  Alb  3.5  /  TBili  0.6  /  DBili  x   /  AST  45<H>  /  ALT  41  /  AlkPhos  114  05-11

## 2017-05-11 NOTE — CONSULT NOTE ADULT - PROBLEM SELECTOR RECOMMENDATION 9
1.) continue ASA  2.) most likely will need LCEA on this admission if pt/family agrees  3.) Dr Brown to d/w family  4.) d/w Dr. Brown and agrees 1- needs repeat MRI and b/l Carotid Duplex today 5/11  2- cardio consult needed, will need stress test/clearance prior to surgery (Dr Gamble)  3- Planning for LCEA surgery on monday 5/15 depending on the MRI/duplex results  4- recommend ABIs/PVRs while here to evaluate vascularity  5- d/w Dr. Brown and agrees

## 2017-05-11 NOTE — H&P ADULT - NSHPPHYSICALEXAM_GEN_ALL_CORE
General: NAD, AAO x 3  HEENT: Nontraumatic, normocephalic, PERRLA, EOMI  NECK: Supple, no JVD, no carotid bruit   CHEST: CTA BL, no wheeze/ ronchi/ rales   CVS: S1 S2 , systolic murmur at right 2nd intercostal region General: NAD, AAO x 3  HEENT: Nontraumatic, normocephalic, PERRLA, EOMI  NECK: Supple, no JVD, no carotid bruit   CHEST: CTA BL, no wheeze/ ronchi/ rales   CVS: S1 S2 , systolic murmur at right 2nd intercostal region, no rubs/ gallops  ABD: Soft, non-tender , non -distended , BS +, tympanic on percussion   NEURO: AAOx3, Cranial nerves intact, motor 5/5 , moving all extremities equally, sensation intact . Affect appropriate   EXT: No pedal edema, no cyanosis , pulses weak bilaterally GENERAL: NAD, well-groomed, well-developed  HEAD:  Atraumatic, Normocephalic  EYES: EOMI, PERRLA, conjunctiva and sclera clear  ENMT: No tonsillar erythema, exudates, or enlargement; Moist mucous membranes, Good dentition, No lesions  NECK: Supple, No JVD, Normal thyroid  NERVOUS SYSTEM:  Alert & Oriented X3, Good concentration; Motor Strength 5/5 B/L upper and lower extremities; DTRs 2+ intact and symmetric  CHEST/LUNG: Clear to percussion bilaterally; No rales, rhonchi, wheezing, or rubs  HEART: Regular rate and rhythm; right intercostal region murmur , rubs, or gallops  ABDOMEN: Soft, Nontender, Nondistended; Bowel sounds present  EXTREMITIES:  Weak  Peripheral Pulses, No clubbing, cyanosis, or edema  LYMPH: No lymphadenopathy noted  SKIN: No rashes or lesions

## 2017-05-11 NOTE — H&P ADULT - PROBLEM SELECTOR PLAN 6
Fibromyalgia ( previously patient on Lyrica at home , that wa stopped on last admission)  Continue with Tylenol as needed

## 2017-05-11 NOTE — PROGRESS NOTE ADULT - SUBJECTIVE AND OBJECTIVE BOX
Patient is a 76y old  Female who presents with a chief complaint of Brought by son for sudden onset of tongue heaviness, slurred speech since 9:00 PM (11 May 2017 02:56).Patient lost her  a few weeks ago, and therefore has not followed up on the need for the surgery.     INTERVAL HPI/OVERNIGHT EVENTS:  Patient no longer has tongue heaviness.  She c/o myalgia and arthralgia for several months, which she attributes to the use of statins and no longer being on lyrica. Patient lost her  a few weeks ago    MEDICATIONS  (STANDING):  aspirin  chewable 81milliGRAM(s) Oral daily  atorvastatin 40milliGRAM(s) Oral at bedtime  pantoprazole    Tablet 40milliGRAM(s) Oral before breakfast  heparin  Injectable 5000Unit(s) SubCutaneous every 8 hours  insulin lispro (HumaLOG) corrective regimen sliding scale  SubCutaneous three times a day before meals    MEDICATIONS  (PRN):  acetaminophen   Tablet. 650milliGRAM(s) Oral every 6 hours PRN Mild Pain (1 - 3)  dextrose Gel 1Dose(s) Oral once PRN Blood Glucose LESS THAN 70 milliGRAM(s)/deciLiter  glucagon  Injectable 1milliGRAM(s) IntraMuscular once PRN Glucose <70 milliGRAM(s)/deciLiter  Allergies:  No Known Allergies    REVIEW OF SYSTEMS:  CONSTITUTIONAL: No fever, weight loss, or fatigue    Alert, cooperative 79 yo woman in NAD  Vital Signs Last 24 Hrs  T(C): 37, Max: 37 (05-11 @ 11:16)  T(F): 98.6, Max: 98.6 (05-11 @ 11:16)  HR: 83 (72 - 90)  BP: 122/73 (118/77 - 152/82)  BP(mean): --  RR: 16 (16 - 18)  SpO2: 99% (98% - 100%)    EYES: EOMI, PERRLA, conjunctiva and sclera clear  SKIN: No rashes or lesions  CHEST/LUNG: Clear to percussion bilaterally; No rales, rhonchi, wheezing, or rubs  HEART: Regular rate and rhythm; II/VI systolic murmur, loudest at the right sternal border, without radiation.   ABDOMEN: Soft, Nontender, Nondistended; Bowel sounds present  EXTREMITIES:  1+ edema at ankles  NERVOUS SYSTEM:  Alert & Oriented X3, Good concentration; Motor Strength 5/5 B/L upper and lower extremities; DTRs 2+ intact and symmetric    LABS:                        11.0   9.5   )-----------( 342      ( 11 May 2017 05:57 )             31.6     05-11    125<L>  |  90<L>  |  14  ----------------------------<  116<H>  4.0   |  26  |  0.83    Ca    8.6      11 May 2017 05:57  Phos  3.1     05-11  Mg     1.9     05-11    TPro  7.5  /  Alb  3.5  /  TBili  0.6  /  DBili  x   /  AST  45<H>  /  ALT  41  /  AlkPhos  114  05-11    CAPILLARY BLOOD GLUCOSE  205 (11 May 2017 11:41)  141 (11 May 2017 08:00)  197 (10 May 2017 23:17)    KEDAR done January 19, 2017:  CONCLUSIONS:  1. Normal mitral valve. Mild-moderate mitral regurgitation.  2. Calcified trileaflet aortic valve with decreased  opening. Mild aortic stenosis. Mild-moderate aortic  regurgitation.  3. Simple atheroma (sessile plaque protruding 1.0-3.9mm  into lumen) noted in aortic arch/descending aorta.  4. Normal left atrium.  No left atrium or left atrial  appendage thrombus.  5. Normal left ventricular internal dimensions and wall  thicknesses.  6. Normal Left Ventricular Systolic Function,  (EF = 55 to  60%)  7. Normal right atrium.  8. Normal right ventricular size and function.  9. There is trace tricuspid regurgitation.  10. Normal pulmonic valve.  11. Contrast injection demonstrates no evidence of a patent  foramen ovale.  12. No intracardiac thrombus or masses documented.  There  is an intra-atrial septal aneurysm noted.    Care Discussed with Consultants/Other Providers [X ] YES  [ ] NO  I have spoken to Dr. Abebe, Cardiologist, to coordinate care.

## 2017-05-11 NOTE — H&P ADULT - PROBLEM SELECTOR PLAN 7
Improved VTE score is 1, with 3 month risk of VTE is 0.6, will give heparin  Protonix for GI prophylaxis

## 2017-05-12 ENCOUNTER — TRANSCRIPTION ENCOUNTER (OUTPATIENT)
Age: 77
End: 2017-05-12

## 2017-05-12 LAB
ANION GAP SERPL CALC-SCNC: 9 MMOL/L — SIGNIFICANT CHANGE UP (ref 5–17)
BUN SERPL-MCNC: 16 MG/DL — SIGNIFICANT CHANGE UP (ref 7–18)
CALCIUM SERPL-MCNC: 9.6 MG/DL — SIGNIFICANT CHANGE UP (ref 8.4–10.5)
CHLORIDE SERPL-SCNC: 94 MMOL/L — LOW (ref 96–108)
CO2 SERPL-SCNC: 26 MMOL/L — SIGNIFICANT CHANGE UP (ref 22–31)
CREAT SERPL-MCNC: 0.87 MG/DL — SIGNIFICANT CHANGE UP (ref 0.5–1.3)
GLUCOSE SERPL-MCNC: 113 MG/DL — HIGH (ref 70–99)
HCT VFR BLD CALC: 36.7 % — SIGNIFICANT CHANGE UP (ref 34.5–45)
HGB BLD-MCNC: 12.5 G/DL — SIGNIFICANT CHANGE UP (ref 11.5–15.5)
MAGNESIUM SERPL-MCNC: 2.1 MG/DL — SIGNIFICANT CHANGE UP (ref 1.6–2.6)
MCHC RBC-ENTMCNC: 28.4 PG — SIGNIFICANT CHANGE UP (ref 27–34)
MCHC RBC-ENTMCNC: 34 GM/DL — SIGNIFICANT CHANGE UP (ref 32–36)
MCV RBC AUTO: 83.4 FL — SIGNIFICANT CHANGE UP (ref 80–100)
PHOSPHATE SERPL-MCNC: 3.2 MG/DL — SIGNIFICANT CHANGE UP (ref 2.5–4.5)
PLATELET # BLD AUTO: 336 K/UL — SIGNIFICANT CHANGE UP (ref 150–400)
POTASSIUM SERPL-MCNC: 4.6 MMOL/L — SIGNIFICANT CHANGE UP (ref 3.5–5.3)
POTASSIUM SERPL-SCNC: 4.6 MMOL/L — SIGNIFICANT CHANGE UP (ref 3.5–5.3)
RBC # BLD: 4.4 M/UL — SIGNIFICANT CHANGE UP (ref 3.8–5.2)
RBC # FLD: 13.5 % — SIGNIFICANT CHANGE UP (ref 10.3–14.5)
SODIUM SERPL-SCNC: 129 MMOL/L — LOW (ref 135–145)
WBC # BLD: 6.8 K/UL — SIGNIFICANT CHANGE UP (ref 3.8–10.5)
WBC # FLD AUTO: 6.8 K/UL — SIGNIFICANT CHANGE UP (ref 3.8–10.5)

## 2017-05-12 PROCEDURE — 70551 MRI BRAIN STEM W/O DYE: CPT | Mod: 26

## 2017-05-12 PROCEDURE — 93925 LOWER EXTREMITY STUDY: CPT | Mod: 26

## 2017-05-12 PROCEDURE — 93880 EXTRACRANIAL BILAT STUDY: CPT | Mod: 26

## 2017-05-12 PROCEDURE — 99232 SBSQ HOSP IP/OBS MODERATE 35: CPT | Mod: GC

## 2017-05-12 RX ORDER — SODIUM CHLORIDE 9 MG/ML
1000 INJECTION, SOLUTION INTRAVENOUS
Qty: 0 | Refills: 0 | Status: DISCONTINUED | OUTPATIENT
Start: 2017-05-14 | End: 2017-05-14

## 2017-05-12 RX ADMIN — HEPARIN SODIUM 5000 UNIT(S): 5000 INJECTION INTRAVENOUS; SUBCUTANEOUS at 21:10

## 2017-05-12 RX ADMIN — Medication 81 MILLIGRAM(S): at 14:30

## 2017-05-12 RX ADMIN — HEPARIN SODIUM 5000 UNIT(S): 5000 INJECTION INTRAVENOUS; SUBCUTANEOUS at 14:30

## 2017-05-12 RX ADMIN — ATORVASTATIN CALCIUM 40 MILLIGRAM(S): 80 TABLET, FILM COATED ORAL at 21:10

## 2017-05-12 RX ADMIN — Medication 1: at 18:41

## 2017-05-12 RX ADMIN — HEPARIN SODIUM 5000 UNIT(S): 5000 INJECTION INTRAVENOUS; SUBCUTANEOUS at 06:25

## 2017-05-12 RX ADMIN — Medication 50 MILLIGRAM(S): at 21:10

## 2017-05-12 RX ADMIN — Medication 4: at 14:30

## 2017-05-12 RX ADMIN — Medication 75 MILLIGRAM(S): at 06:25

## 2017-05-12 NOTE — DISCHARGE NOTE ADULT - CARE PROVIDERS DIRECT ADDRESSES
,DirectAddress_Unknown,[email or web address],DirectAddress_Unknown,[email or web address]

## 2017-05-12 NOTE — PROGRESS NOTE ADULT - PROBLEM SELECTOR PLAN 1
-Symptoms (onset of symptoms at 9:00 pm , with tongue heaviness, feeling tired) resolved in ED , NIHS stroke scale is 0  -CT head no acute changes , chronic changes present   -received aspirin 325, Lipitor 80 mg , 1 L NS bolus.   -EKG NSR at 88 bpm , RBBB  -c/w telemetry  -cardiac enzymes (-) for ischemia  -neurology Dr. Goldsmith consulted  -pt had previous full workup with carotid stenosis  -Vascular Dr. Ritchie: to perform CEA on Monday (NPO after midnight Sunday night)  -Dr. Ritchie awaiting medical clearance by cardio (Dr. Brooks)  -f/u MRI, CD. No need for TTE as pt had TTE and KEDAR in Jan. f/u Cardio for clearance  -c/w lipitor 40 and ASA 81 at this time.

## 2017-05-12 NOTE — DISCHARGE NOTE ADULT - PLAN OF CARE
Resolution Follow up w/ Neurologist-Dr. Wilson or Dr. Castro Remain symptom free Remain pain free Continue w/ pain medication as needed Maintain normal blood pressure Adhere to low salt diet  Take your medication daily as instructed  Follow up w/ Dr. Abebe Maintain normal blood sugar Check your glucose three times daily and record the reading(numbers) Continue w/ Jj Follow up w/ Dr. James Follow up w/ Neurologist-Dr. Wilson or Dr. Castro  Follow up w/ Dr. Abebe regarding loop recorder to rule out atrial fibrillation

## 2017-05-12 NOTE — DISCHARGE NOTE ADULT - NSFTFSERV1RD_GEN_ALL_CORE
medication teaching and assessment/other:/teaching and training/rehabilitation services/observation and assessment

## 2017-05-12 NOTE — DISCHARGE NOTE ADULT - CARE PROVIDER_API CALL
Yung Abebe), Cardiology Medicine  [address] [place] [address]  Phone: [phone]  Fax: [phone]    Antione Castro (YASIR), Neurology; Vascular Neurology  [address] [place] [address]  Phone: [phone]  Fax: [phone]    Cris James), Urology  [address] [place] [address]  Phone: [phone]  Fax: [phone]    Analia Huitron), Gastroenterology; Internal Medicine  [address] [place] [address]  Phone: [phone]  Fax: [phone]

## 2017-05-12 NOTE — DISCHARGE NOTE ADULT - ADDITIONAL INSTRUCTIONS
Follow up w/ Dr. Abebe in 1 week  Follow up w/ Neurologist-Dr. Wilson or Dr. Castro  Follow up w/ Gastroenterologist-Dr. Huitron  Follow up w/ Urologist-Dr. James

## 2017-05-12 NOTE — DISCHARGE NOTE ADULT - MEDICATION SUMMARY - MEDICATIONS TO STOP TAKING
After Visit Summary   5/2/2017    Gavino Escobar    MRN: [id number]           Patient Information     Date Of Birth          2010        Visit Information        Provider Department      5/2/2017 9:00 AM Geri Baumann MD Peds Dermatology        Today's Diagnoses     Keloid    -  1      Care Instructions    [facility]- Pediatric Dermatology  Dr. Geri Baumann, Dr. Kelsie Grubbs, Dr. Leela Barry, Dr. Lupe Jaimes, Dr. Everette Gentile       Pediatric Appointment Scheduling and Call Center [phone]     Non Urgent -Triage Voicemail Line; [phone]- Evelyn and Cindy RN's. Messages are checked periodically throughout the day and are returned as soon as possible.      Clinic Fax number: [phone]    If you need a prescription refill, please contact your pharmacy. They will send us an electronic request. Refills are approved or denied by our Physicians during normal business hours, Monday through Fridays    Per office policy, refills will not be granted if you have not been seen within the past year (or sooner depending on your child's condition)    *Radiology Scheduling- [phone]  *Sedation Unit Scheduling- [phone]  *[place] Scheduling- General [phone]; Pediatric Dermatology [phone]  *Main  Services: [phone]   Chinese: [phone]   Malian: [phone]   Hmong/Maori/Charly: [phone]    For urgent matters that cannot wait until the next business day, is over a holiday and/or a weekend please call [phone] and ask for the Dermatology Resident On-Call to be paged.      We recommend waiting, but if you decide to go forward with the procedure, here is the plan:  1. Will need a thorough history and physical exam because the procedure will require general anesthesia.  2. We will schedule the procedure. Details wound care instructions will be given at that time.  3. Injections will be required every 4-6  weeks to prevent it from coming back.     Gavino's sedation appointment to remove his keloid scar is scheduled for May 10, 2017 at 1030. Please arrive at 930.    He will need to have a pre-op exam done by his primary care provider before this appointment.     Pediatric Dermatology  [facility]  [address] [facility] [address]  [place] [address]  [phone]    PEDIATRIC SEDATION UNIT- Excision, Procedures, Sedated Imaging and Sedated Pulsed Dye Laser Therapy     What do I need to know?    A complete a history and physical (pre op) within 30 days at your primary care clinic needs to be completed and faxed it to the sedation unit at [phone].    There is no blood work that is needed for this procedure.     You will arrive and check into the Children s Imaging and Sedation desk, 1 hour prior to your scheduled procedure time.  Diet Restrictions    Patients of all ages may have clear liquids until 2 hours prior to your scheduled arrival time. Clear liquids include water, PedialyteR, GatoradeR, apple juice or liquids that one can read through. (Any liquids containing milk are not clear liquids).    Infants may have breast milk until 4 hours prior to your scheduled arrival time. Infants on formula may have formula 6 hours prior to you scheduled arrival time.     No milk or food is allowed 6 hours prior to your scheduled arrival time  o If diet restrictions are not followed, your case may be cancelled and rescheduled.   Location    Sedation unit is located on the 2nd floor (street level) near pediatric radiology department in the [facility] building (adjacent to the [facility]). Sedation Unit Phone number: [phone]     You will arrive the day of your procedure and check into the Children s Sedation and Observation Unit on the lobby level of the [facility]. Once you are checked in, you will be brought back to your private room, where a bed and TV is  available for your child. Your nurse will come in and introduce herself. Your Pediatric Dermatologist will come in and review the procedure with you. You may ask any last minute questions during this time. You will also meet the anesthesiologist who will be working with your child that day. An IV will be placed by the nursing staff, and only used during the procedure but is required. Once the provider and Sedation room is ready your child along with you will be brought back to the procedure room prior to him/her being put to sleep. You will be able to wait in your private assigned room during the procedure.    The two most common medications used during a procedure are Propofol and Fentanyl. With the use of these medications; there is usually no need for a tube placement for airway access, so your child is able to breathe on their own during the procedure. Occasionally, supplemental oxygen is provided temporarily. Your child s vital signs are also monitored continually throughout this process.     Your child will be under anesthesia for around 10 minutes for pulsed dye laser treatment, depending on the size of the birthmark. For an excision or other procedures, your child may be under anesthesia for about 20-30 minutes or sometimes longer. Once the procedure is complete your child will be brought back into their private room where you have been waiting. Children tend to wake up rather quickly. It is not uncommon for him/her to be upset and or confused during the  wake up  period. Once awake, your child will receive liquids/you will be able to feed him/her, or a meal will be provided (selected by the child prior to the procedure).    If you are questioning insurance coverage, we encourage you to contact your insurance company regarding your out of pocket responsibility. Please contact our clinic if your insurance company requires pre authorization or pre determination prior to the procedure. We will submit the  required documentation and inform you of the approval or denial. If a pre authorization or pre determination is required please call the clinic triage line at [phone] and leave your child s full name (including spelling), date of birth and the information the insurance company told you are required. We will contact your insurance company at that time when directed by you.   Child and Family Life Resources:   Discover Passport to [facility] , is a smartphone helio designed to help patients and families prepare for their healthcare experience at [facility].     The helio is free to download on any smart phone though Play Store on your Droid or the Apple iTunes Store.  How to download: search-passport [facility] [employer] Kids- [facility].      Created by certified child life specialists, the helio gives patients and their families and healthcare provider s access to child-friendly information on common medical procedures, tips on preparing for a hospital stay and resources for families.     The helio includes five sections:  Virtual Tours, Procedure Preparation, Family Resources, Preparing for your Hospital Stay and the Coloring Board. This helio can also be used by patients, families and caregivers outside of the [facility]. The Procedure Preparation section and Coloring Board are universal tools designated to be useful for children everywhere.            Follow-ups after your visit        Who to contact     Please call your clinic at [phone] to:    Ask questions about your health    Make or cancel appointments    Discuss your medicines    Learn about your test results    Speak to your doctor   If you have compliments or concerns about an experience at your clinic, or if you wish to file a complaint, please contact [facility] Phys[facility] Patient Relations at [phone] or email us at  [email or web address]         Additional Information About Your Visit        MyChart Information     [employer]hart is an electronic gateway that provides easy, online access to your medical records. With [employer], you can request a clinic appointment, read your test results, renew a prescription or communicate with your care team.     To sign up for [employer], please contact your [facility] or call [phone] for assistance.           Care EveryWhere ID     This is your Care EveryWhere ID. This could be used by other organizations to access your [facility] medical records  [id number]        Your Vitals Were     Pulse Height BMI (Body Mass Index)             102 3' 8.29\" (112.5 cm) 16.28 kg/m2          Blood Pressure from Last 3 Encounters:   05/02/17 103/49    Weight from Last 3 Encounters:   05/02/17 45 lb 6.6 oz (20.6 kg) (72 %)*   01/14/16 46 lb 1.2 oz (20.9 kg) (97 %)*     * Growth percentiles are based on [facility] 2-20 Years data.              Today, you had the following     No orders found for display       Primary Care Provider Office Phone # Fax #    Tonya David Alvarez [phone]       [address] [address]        Thank you!     Thank you for choosing PEDS DERMATOLOGY  for your care. Our goal is always to provide you with excellent care. Hearing back from our patients is one way we can continue to improve our services. Please take a few minutes to complete the written survey that you may receive in the mail after your visit with us. Thank you!             Your Updated Medication List - Protect others around you: Learn how to safely use, store and throw away your medicines at www.disposemymeds.org.      Notice  As of 5/2/2017  9:30 AM    You have not been prescribed any medications.      " I will STOP taking the medications listed below when I get home from the hospital:  None I will STOP taking the medications listed below when I get home from the hospital:    atorvastatin 40 mg oral tablet  -- 1 tab(s) by mouth once a day (at bedtime)

## 2017-05-12 NOTE — DISCHARGE NOTE ADULT - SECONDARY DIAGNOSIS.
Carotid artery stenosis, unilateral, left Fibromyalgia Essential hypertension Type 2 diabetes mellitus Urinary retention

## 2017-05-12 NOTE — DISCHARGE NOTE ADULT - CARE PLAN
Principal Discharge DX:	Stroke  Goal:	Resolution  Instructions for follow-up, activity and diet:	Follow up w/ Neurologist-Dr. Wilson or Dr. Castro  Secondary Diagnosis:	Carotid artery stenosis, unilateral, left  Goal:	Remain symptom free  Instructions for follow-up, activity and diet:	Follow up w/ Neurologist-Dr. Wilson or Dr. Castro  Secondary Diagnosis:	Fibromyalgia  Goal:	Remain pain free  Instructions for follow-up, activity and diet:	Continue w/ pain medication as needed  Secondary Diagnosis:	Essential hypertension  Goal:	Maintain normal blood pressure  Instructions for follow-up, activity and diet:	Adhere to low salt diet  Take your medication daily as instructed  Follow up w/ Dr. Abebe  Secondary Diagnosis:	Type 2 diabetes mellitus  Goal:	Maintain normal blood sugar  Instructions for follow-up, activity and diet:	Check your glucose three times daily and record the reading(numbers)  Secondary Diagnosis:	Urinary retention  Goal:	Continue w/ Jj  Instructions for follow-up, activity and diet:	Follow up w/ Dr. James Principal Discharge DX:	Stroke  Goal:	Resolution  Instructions for follow-up, activity and diet:	Follow up w/ Neurologist-Dr. Wilson or Dr. Castro  Follow up w/ Dr. Abebe regarding loop recorder to rule out atrial fibrillation  Secondary Diagnosis:	Carotid artery stenosis, unilateral, left  Goal:	Remain symptom free  Instructions for follow-up, activity and diet:	Follow up w/ Neurologist-Dr. Wilson or Dr. Castro  Secondary Diagnosis:	Fibromyalgia  Goal:	Remain pain free  Instructions for follow-up, activity and diet:	Continue w/ pain medication as needed  Secondary Diagnosis:	Essential hypertension  Goal:	Maintain normal blood pressure  Instructions for follow-up, activity and diet:	Adhere to low salt diet  Take your medication daily as instructed  Follow up w/ Dr. Abebe  Secondary Diagnosis:	Type 2 diabetes mellitus  Goal:	Maintain normal blood sugar  Instructions for follow-up, activity and diet:	Check your glucose three times daily and record the reading(numbers)  Secondary Diagnosis:	Urinary retention  Goal:	Continue w/ Jj  Instructions for follow-up, activity and diet:	Follow up w/ Dr. James

## 2017-05-12 NOTE — PROGRESS NOTE ADULT - SUBJECTIVE AND OBJECTIVE BOX
CC: Patient is a 76y old  Female who presents with a chief complaint of Brought by son for sudden onset of tongue heaviness, slurred speech   Admit Dx: TIA likely secondary to carotid stenosis  Overnight Events: no acute events  Pt seen/examined at the bedside, no new complaints.    Vitals:  T(C): 36.4, Max: 36.9 (05-12 @ 07:50)  HR: 60 (60 - 95)  BP: 99/48 (99/48 - 152/74)  RR: 18 (18 - 19)  SpO2: 98% (97% - 100%)      PHYSICAL EXAM:  GENERAL: NAD, well-groomed, well-developed  HEAD:  Atraumatic, Normocephalic  EYES: EOMI, PERRLA, conjunctiva and sclera clear  CHEST/LUNG: Clear to auscultation bilaterally; No w/r/r  HEART: S1/S2+  No m/g/r  ABDOMEN: Soft, Nontender, Nondistended; Bowel sounds present  EXTREMITIES:  2+ Peripheral Pulses, No clubbing, cyanosis, or edema  NUERO:  AAO X3, No focal deficits    LABS:                        12.5   6.8   )-----------( 336      ( 12 May 2017 07:23 )             36.7     05-12    129<L>  |  94<L>  |  16  ----------------------------<  113<H>  4.6   |  26  |  0.87    Ca    9.6      12 May 2017 07:23  Phos  3.2     05-12  Mg     2.1     05-12    TPro  7.5  /  Alb  3.5  /  TBili  0.6  /  DBili  x   /  AST  45<H>  /  ALT  41  /  AlkPhos  114  05-11

## 2017-05-12 NOTE — DISCHARGE NOTE ADULT - NS AS DC STROKE ED MATERIALS
Call 911 for Stroke/Prescribed Medications/Stroke Education Booklet/Need for Followup After Discharge/Stroke Warning Signs and Symptoms/Risk Factors for Stroke

## 2017-05-12 NOTE — DISCHARGE NOTE ADULT - MEDICATION SUMMARY - MEDICATIONS TO TAKE
I will START or STAY ON the medications listed below when I get home from the hospital:    Rolling walker  -- Indication: For Ambulation    acetaminophen 325 mg oral tablet  -- 2 tab(s) by mouth every 6 hours, As needed, Mild Pain (1 - 3)  -- Indication: For Pain    aspirin 81 mg oral tablet  -- 1 tab(s) by mouth once a day  -- Indication: For Stroke    losartan 25 mg oral tablet  -- 1 tab(s) by mouth once a day  -- Indication: For Essential hypertension    tamsulosin 0.4 mg oral capsule  -- 1 cap(s) by mouth once a day (at bedtime)  -- Indication: For Urinary retention    metFORMIN 750 mg oral tablet, extended release  -- 1  by mouth 2 times a day  -- Indication: For Type 2 diabetes mellitus    atorvastatin 40 mg oral tablet  -- 1 tab(s) by mouth once a day (at bedtime)  -- Indication: For Stroke    niMODipine 30 mg oral capsule  -- 2 cap(s) by mouth 4 times a day  -- Avoid grapefruit and grapefruit juice while taking this medication.  It is very important that you take or use this exactly as directed.  Do not skip doses or discontinue unless directed by your doctor.  Store this medication in the original package.    -- Indication: For Prophylactic measure    lidocaine 5% topical film  -- Apply on skin to affected area once a day  -- Indication: For Skin    pantoprazole 40 mg oral delayed release tablet  -- 1 tab(s) by mouth once a day (before a meal)  -- Indication: For GI prophylaxis    Hiprex hippurate 1 g oral tablet  -- Take 1/2 tab by mouth 2 times a day  -- Finish all this medication unless otherwise directed by prescriber.  Medication should be taken with plenty of water.  Obtain medical advice before taking any non-prescription drugs as some may affect the action of this medication.  Take with food or milk.    -- Indication: For Urinary retention I will START or STAY ON the medications listed below when I get home from the hospital:    Rolling walker  -- Indication: For Ambulation    acetaminophen 325 mg oral tablet  -- 2 tab(s) by mouth every 6 hours, As needed, Mild Pain (1 - 3)  -- Indication: For Pain    aspirin 81 mg oral tablet  -- 1 tab(s) by mouth once a day  -- Indication: For Stroke    losartan 25 mg oral tablet  -- 1 tab(s) by mouth once a day  -- Indication: For Essential hypertension    tamsulosin 0.4 mg oral capsule  -- 1 cap(s) by mouth once a day (at bedtime)  -- Indication: For Urinary retention    metFORMIN 750 mg oral tablet, extended release  -- 1  by mouth 2 times a day  -- Indication: For Type 2 diabetes mellitus    Crestor 10 mg oral tablet  -- 1 tab(s) by mouth once a day (at bedtime)  -- Indication: For Stroke    lidocaine 5% topical film  -- Apply on skin to affected area once a day  -- Indication: For Skin    pantoprazole 40 mg oral delayed release tablet  -- 1 tab(s) by mouth once a day (before a meal)  -- Indication: For Gi prophylaxis    Hiprex hippurate 1 g oral tablet  -- Take 1/2 tab by mouth 2 times a day  -- Finish all this medication unless otherwise directed by prescriber.  Medication should be taken with plenty of water.  Obtain medical advice before taking any non-prescription drugs as some may affect the action of this medication.  Take with food or milk.    -- Indication: For Urinary retention

## 2017-05-12 NOTE — DISCHARGE NOTE ADULT - PATIENT PORTAL LINK FT
“You can access the Follow[facility] Patient Portal, offered by [facility], by registering with the following website: [email or web address]”

## 2017-05-12 NOTE — DISCHARGE NOTE ADULT - HOSPITAL COURSE
75 y/o female w/ PMH of CVA ( Right frontal MCA , s/p TPA in Jan2017), small SAH ( acute in Right frontal region, jan 2017)  Fibromyalgia,  HTN ( Losartin/ HCTz 100/25),  DM2 ( Meformin 750mg BID, last HBA1c is 7.3 ).  Presented to the ED with tongue heaviness, fatigue, since 9;00 pm of 5/10/17.     Admitted for Stroke    Stroke  CT evidence of an acute  transcortical infarct. Moderate chronic ischemic changes in the frontoparietal white matter.   Carotid Endarterectomy performed  Neurologist -Dr Castro consulted   Cardiologist-Dr. Abebe consulted     Carotid Stenosis  Left sided > 70 % stenosis   Carotid Endarterectomy performed  Vascular Surgeon-Dr. Ritchie consulted    Hypertension  Blood pressure monitored and stable  Cardiologist-Dr. Abebe consulted    Type 2 DM  Finger sticks monitored  Treated w/ Humalog    Fibromyalgia   Stable  Pain management given    Urinary Tract Infection  Urinalysis positive  Infectious Disease-Dr. Ac consulted  Treated w/ antibiotic    Urinary Retention  Jj placed  Urologist-Dr. James consulted    DVT & GI prophylaxis given

## 2017-05-13 DIAGNOSIS — G45.9 TRANSIENT CEREBRAL ISCHEMIC ATTACK, UNSPECIFIED: ICD-10-CM

## 2017-05-13 LAB
ANION GAP SERPL CALC-SCNC: 7 MMOL/L — SIGNIFICANT CHANGE UP (ref 5–17)
BUN SERPL-MCNC: 28 MG/DL — HIGH (ref 7–18)
CALCIUM SERPL-MCNC: 8.9 MG/DL — SIGNIFICANT CHANGE UP (ref 8.4–10.5)
CHLORIDE SERPL-SCNC: 94 MMOL/L — LOW (ref 96–108)
CO2 SERPL-SCNC: 27 MMOL/L — SIGNIFICANT CHANGE UP (ref 22–31)
CREAT SERPL-MCNC: 1.01 MG/DL — SIGNIFICANT CHANGE UP (ref 0.5–1.3)
GLUCOSE SERPL-MCNC: 125 MG/DL — HIGH (ref 70–99)
HCT VFR BLD CALC: 33.6 % — LOW (ref 34.5–45)
HGB BLD-MCNC: 11.5 G/DL — SIGNIFICANT CHANGE UP (ref 11.5–15.5)
MAGNESIUM SERPL-MCNC: 2.1 MG/DL — SIGNIFICANT CHANGE UP (ref 1.6–2.6)
MCHC RBC-ENTMCNC: 28.3 PG — SIGNIFICANT CHANGE UP (ref 27–34)
MCHC RBC-ENTMCNC: 34.1 GM/DL — SIGNIFICANT CHANGE UP (ref 32–36)
MCV RBC AUTO: 83 FL — SIGNIFICANT CHANGE UP (ref 80–100)
PHOSPHATE SERPL-MCNC: 3.5 MG/DL — SIGNIFICANT CHANGE UP (ref 2.5–4.5)
PLATELET # BLD AUTO: 338 K/UL — SIGNIFICANT CHANGE UP (ref 150–400)
POTASSIUM SERPL-MCNC: 4.4 MMOL/L — SIGNIFICANT CHANGE UP (ref 3.5–5.3)
POTASSIUM SERPL-SCNC: 4.4 MMOL/L — SIGNIFICANT CHANGE UP (ref 3.5–5.3)
RBC # BLD: 4.05 M/UL — SIGNIFICANT CHANGE UP (ref 3.8–5.2)
RBC # FLD: 13.5 % — SIGNIFICANT CHANGE UP (ref 10.3–14.5)
SODIUM SERPL-SCNC: 128 MMOL/L — LOW (ref 135–145)
WBC # BLD: 7.5 K/UL — SIGNIFICANT CHANGE UP (ref 3.8–10.5)
WBC # FLD AUTO: 7.5 K/UL — SIGNIFICANT CHANGE UP (ref 3.8–10.5)

## 2017-05-13 PROCEDURE — 99232 SBSQ HOSP IP/OBS MODERATE 35: CPT

## 2017-05-13 RX ORDER — INSULIN LISPRO 100/ML
2 VIAL (ML) SUBCUTANEOUS ONCE
Qty: 0 | Refills: 0 | Status: COMPLETED | OUTPATIENT
Start: 2017-05-13 | End: 2017-05-13

## 2017-05-13 RX ADMIN — Medication 650 MILLIGRAM(S): at 15:30

## 2017-05-13 RX ADMIN — Medication 50 MILLIGRAM(S): at 22:44

## 2017-05-13 RX ADMIN — Medication 650 MILLIGRAM(S): at 14:52

## 2017-05-13 RX ADMIN — HEPARIN SODIUM 5000 UNIT(S): 5000 INJECTION INTRAVENOUS; SUBCUTANEOUS at 06:38

## 2017-05-13 RX ADMIN — Medication 1: at 08:24

## 2017-05-13 RX ADMIN — ATORVASTATIN CALCIUM 40 MILLIGRAM(S): 80 TABLET, FILM COATED ORAL at 22:44

## 2017-05-13 RX ADMIN — Medication 81 MILLIGRAM(S): at 12:32

## 2017-05-13 RX ADMIN — HEPARIN SODIUM 5000 UNIT(S): 5000 INJECTION INTRAVENOUS; SUBCUTANEOUS at 22:44

## 2017-05-13 RX ADMIN — Medication 2: at 12:33

## 2017-05-13 RX ADMIN — Medication 2 UNIT(S): at 22:54

## 2017-05-13 RX ADMIN — HEPARIN SODIUM 5000 UNIT(S): 5000 INJECTION INTRAVENOUS; SUBCUTANEOUS at 14:52

## 2017-05-13 RX ADMIN — Medication 1: at 16:57

## 2017-05-13 NOTE — CHART NOTE - NSCHARTNOTEFT_GEN_A_CORE
Paged by the floor saying that neurology Dr. Goldsmith wants to speak to on call resident.    Patient is scheduled for carotid endarterectomy on Monday, however carotid doppler from 5/12/17 shows normal result:    PROCEDURE DATE:  05/12/2017        INTERPRETATION:  Carotid duplex Doppler ultrasound    Indication: Slurred speech.    Carotid duplex Doppler ultrasound is performed. Grayscale ultrasound   demonstrates atherosclerotic plaques in the bilateral carotid bulbs and   proximal internal carotid arteries. The flow velocity measurements during   peak systolic phase in centimeters per second are as the following:    Right CCA 46, ICA 55, ECA 61.  Left CCA 46, ICA 70, .    The end diastolic velocity measurement for the right ICA is 18, and  for   the left ICA is 22.    The peak systolic ICA/CCA velocity ratio on the right is 1.2, and on the   left is 1.5.    Both vertebral arteries maintain normal antegrade flow direction.    Impression: No hemodynamically significant internal carotid artery   stenosis by velocity criteria.      Since there is no significant carotid artery stenosis noted, there is no indication for carotid endarterectomy; will sign out to the next call team/and primary care team to speak to the Vascular surgery and cancel the procedure. Paged by the floor saying that neurology Dr. Goldsmith wants to speak to on call resident.    Patient is scheduled for carotid endarterectomy on Monday, however carotid doppler from 5/12/17 shows normal result:    PROCEDURE DATE:  05/12/2017        INTERPRETATION:  Carotid duplex Doppler ultrasound    Indication: Slurred speech.    Carotid duplex Doppler ultrasound is performed. Grayscale ultrasound   demonstrates atherosclerotic plaques in the bilateral carotid bulbs and   proximal internal carotid arteries. The flow velocity measurements during   peak systolic phase in centimeters per second are as the following:    Right CCA 46, ICA 55, ECA 61.  Left CCA 46, ICA 70, .    The end diastolic velocity measurement for the right ICA is 18, and  for   the left ICA is 22.    The peak systolic ICA/CCA velocity ratio on the right is 1.2, and on the   left is 1.5.    Both vertebral arteries maintain normal antegrade flow direction.    Impression: No hemodynamically significant internal carotid artery   stenosis by velocity criteria.      Since there is no significant carotid artery stenosis noted, there is no indication for carotid endarterectomy; however it needs to be still confirmed by the radiologist, since there was moderate and severe stenosis found on carotid doppler from January and CTA from May. Currently on call radiologist is not responding. Tried ext. [phone], [phone] and [phone] however no response. Case discussed with attending Dr. Lindsey, who spoke to Dr. Brown, and said that no surgery will be performed on Monday. Will sign out to the next call team/and primary care team. NPO after MN was cancelled. Paged by the floor saying that neurology Dr. Goldsmith wants to speak to on call resident.    Patient is scheduled for carotid endarterectomy on Monday, however carotid doppler from 5/12/17 shows normal result:    PROCEDURE DATE:  05/12/2017        INTERPRETATION:  Carotid duplex Doppler ultrasound    Indication: Slurred speech.    Carotid duplex Doppler ultrasound is performed. Grayscale ultrasound   demonstrates atherosclerotic plaques in the bilateral carotid bulbs and   proximal internal carotid arteries. The flow velocity measurements during   peak systolic phase in centimeters per second are as the following:    Right CCA 46, ICA 55, ECA 61.  Left CCA 46, ICA 70, .    The end diastolic velocity measurement for the right ICA is 18, and  for   the left ICA is 22.    The peak systolic ICA/CCA velocity ratio on the right is 1.2, and on the   left is 1.5.    Both vertebral arteries maintain normal antegrade flow direction.    Impression: No hemodynamically significant internal carotid artery   stenosis by velocity criteria.      Since there is no significant carotid artery stenosis noted, there is no indication for carotid endarterectomy; however it needs to be still confirmed by the radiologist, since there was moderate and severe stenosis found on carotid doppler from January and CTA from May. Currently on call radiologist is not responding. Tried ext. [phone], [phone] and [phone] however no response. Case discussed with attending Dr. Lindsey, who spoke to Dr. Brown, and said that surgery will be performed on Monday. Will sign out to the next call team/and primary care team. NPO after MN was cancelled. Paged by the floor saying that neurology Dr. Goldsmith wants to speak to on call resident.    Patient is scheduled for carotid endarterectomy on Monday, however carotid doppler from 5/12/17 shows normal result:    PROCEDURE DATE:  05/12/2017        INTERPRETATION:  Carotid duplex Doppler ultrasound    Indication: Slurred speech.    Carotid duplex Doppler ultrasound is performed. Grayscale ultrasound   demonstrates atherosclerotic plaques in the bilateral carotid bulbs and   proximal internal carotid arteries. The flow velocity measurements during   peak systolic phase in centimeters per second are as the following:    Right CCA 46, ICA 55, ECA 61.  Left CCA 46, ICA 70, .    The end diastolic velocity measurement for the right ICA is 18, and  for   the left ICA is 22.    The peak systolic ICA/CCA velocity ratio on the right is 1.2, and on the   left is 1.5.    Both vertebral arteries maintain normal antegrade flow direction.    Impression: No hemodynamically significant internal carotid artery   stenosis by velocity criteria.      Since there is no significant carotid artery stenosis noted, there is no indication for carotid endarterectomy; however it needs to be still confirmed by the radiologist, since there was moderate and severe stenosis found on carotid doppler from January and CTA from May. Currently on call radiologist is not responding. Tried ext. [phone], [phone] and [phone] however no response. Case discussed with attending Dr. Lindsey, who spoke to Dr. Brown, and said that surgery will be performed on Monday. Will sign out to the next call team/and primary care team. NPO after MN tomorrow. Paged by the floor saying that neurology Dr. Goldsmith wants to speak to on call resident.    Patient is scheduled for carotid endarterectomy on Monday, however carotid doppler from 5/12/17 shows normal result:    PROCEDURE DATE:  05/12/2017        INTERPRETATION:  Carotid duplex Doppler ultrasound    Indication: Slurred speech.    Carotid duplex Doppler ultrasound is performed. Grayscale ultrasound   demonstrates atherosclerotic plaques in the bilateral carotid bulbs and   proximal internal carotid arteries. The flow velocity measurements during   peak systolic phase in centimeters per second are as the following:    Right CCA 46, ICA 55, ECA 61.  Left CCA 46, ICA 70, .    The end diastolic velocity measurement for the right ICA is 18, and  for   the left ICA is 22.    The peak systolic ICA/CCA velocity ratio on the right is 1.2, and on the   left is 1.5.    Both vertebral arteries maintain normal antegrade flow direction.    Impression: No hemodynamically significant internal carotid artery   stenosis by velocity criteria.      Since there is no significant carotid artery stenosis noted, there is no indication for carotid endarterectomy; however it needs to be still confirmed by the radiologist, since there was moderate and severe stenosis found on carotid doppler from January and CTA from May. Currently on call radiologist is not responding. Tried ext. [phone], [phone] and [phone] however no response. Case discussed with attending Dr. Lindsey, who spoke to Dr. Brown, and said that surgery will be performed on Monday. Will sign out to the next call team/and primary care team. NPO after MN tomorrow.

## 2017-05-13 NOTE — PROGRESS NOTE ADULT - SUBJECTIVE AND OBJECTIVE BOX
Patient seen and examined.   Time of visit:    MEDICATIONS  (STANDING):  aspirin  chewable 81milliGRAM(s) Oral daily  atorvastatin 40milliGRAM(s) Oral at bedtime  heparin  Injectable 5000Unit(s) SubCutaneous every 8 hours  insulin lispro (HumaLOG) corrective regimen sliding scale  SubCutaneous three times a day before meals  dextrose 5%. 1000milliLiter(s) IV Continuous <Continuous>  dextrose 50% Injectable 12.5Gram(s) IV Push once  dextrose 50% Injectable 25Gram(s) IV Push once  dextrose 50% Injectable 25Gram(s) IV Push once  pregabalin 50milliGRAM(s) Oral at bedtime      MEDICATIONS  (PRN):  acetaminophen   Tablet. 650milliGRAM(s) Oral every 6 hours PRN Mild Pain (1 - 3)  dextrose Gel 1Dose(s) Oral once PRN Blood Glucose LESS THAN 70 milliGRAM(s)/deciLiter  glucagon  Injectable 1milliGRAM(s) IntraMuscular once PRN Glucose <70 milliGRAM(s)/deciLiter   Medications up to date at time of exam.      PHYSICAL EXAMINATION:  Patient has no new complaints.  GENERAL: The patient is a well-developed, well-nourished, in no apparent distress.     Vital Signs Last 24 Hrs  T(C): 36.6, Max: 36.8 (05-12 @ 11:17)  T(F): 97.8, Max: 98.2 (05-12 @ 11:17)  HR: 86 (60 - 96)  BP: 127/57 (92/55 - 134/70)  BP(mean): --  RR: 18 (18 - 18)  SpO2: 100% (96% - 100%)   (if applicable)      HEENT: Head is normocephalic and atraumatic. Extraocular muscles are intact. Mucous membranes are moist.     NECK: Supple, no palpable adenopathy.    LUNGS: Clear to auscultation, no wheezing, rales, or rhonchi.    HEART: Regular rate and rhythm holosystolic murmur noted II/VI    ABDOMEN: Soft, nontender, and nondistended.  No hepatosplenomegaly is noted.    EXTREMITIES: Without any cyanosis, clubbing, rash, lesions or edema.    NEUROLOGIC: Awake, alert, oriented.     SKIN: Warm, dry, good turgor.      LABS:                        11.5   7.5   )-----------( 338      ( 13 May 2017 08:09 )             33.6     05-12    129<L>  |  94<L>  |  16  ----------------------------<  113<H>  4.6   |  26  |  0.87    Ca    9.6      12 May 2017 07:23  Phos  3.2     05-12  Mg     2.1     05-12        Urinalysis Basic - ( 11 May 2017 17:16 )    Color: Yellow / Appearance: Clear / S.005 / pH: x  Gluc: x / Ketone: Negative  / Bili: Negative / Urobili: Negative   Blood: x / Protein: Negative / Nitrite: Negative   Leuk Esterase: Trace / RBC: 0-2 /HPF / WBC 3-5 /HPF   Sq Epi: x / Non Sq Epi: Few / Bacteria: Trace /HPF                      MICROBIOLOGY: (if applicable)    RADIOLOGY & ADDITIONAL STUDIES:  EKG:   CXR:  ECHO:    IMPRESSION: 76y Female PAST MEDICAL & SURGICAL HISTORY:  Type 2 diabetes mellitus  Essential hypertension  DM (diabetes mellitus)  HTN (hypertension)  Fibromyalgia  History of appendectomy  No significant past surgical history   p/w     RECOMMENDATIONS:  Patient is scheduled for carotid surgery.  She is low to moderate risk for perioperative complications.  Benefit of surgery outweigh  risks.  Would proceed with surgery if needed.  Continue with present medications.  Blood pressure and heart rate are well controlled at this time.

## 2017-05-13 NOTE — PROGRESS NOTE ADULT - SUBJECTIVE AND OBJECTIVE BOX
Patient is a 76y old  Female who presents with a chief complaint of Brought by son for sudden onset of tongue heaviness, slurred speech since 9:00 PM (12 May 2017 08:43)      INTERVAL HPI/OVERNIGHT EVENTS:    MEDICATIONS  (STANDING):  aspirin  chewable 81milliGRAM(s) Oral daily  atorvastatin 40milliGRAM(s) Oral at bedtime  heparin  Injectable 5000Unit(s) SubCutaneous every 8 hours  insulin lispro (HumaLOG) corrective regimen sliding scale  SubCutaneous three times a day before meals  dextrose 5%. 1000milliLiter(s) IV Continuous <Continuous>  dextrose 50% Injectable 12.5Gram(s) IV Push once  dextrose 50% Injectable 25Gram(s) IV Push once  dextrose 50% Injectable 25Gram(s) IV Push once  pregabalin 50milliGRAM(s) Oral at bedtime    MEDICATIONS  (PRN):  acetaminophen   Tablet. 650milliGRAM(s) Oral every 6 hours PRN Mild Pain (1 - 3)  dextrose Gel 1Dose(s) Oral once PRN Blood Glucose LESS THAN 70 milliGRAM(s)/deciLiter  glucagon  Injectable 1milliGRAM(s) IntraMuscular once PRN Glucose <70 milliGRAM(s)/deciLiter      Allergies    No Known Allergies    Alert, cooperative woman in NAD  Vital Signs Last 24 Hrs  T(C): 36.8, Max: 36.8 (- @ 15:22)  T(F): 98.3, Max: 98.3 (-13 @ 15:22)  HR: 85 (63 - 96)  BP: 136/56 (92/55 - 136/56)  BP(mean): --  RR: 17 (17 - 18)  SpO2: 100% (96% - 100%)    PHYSICAL EXAM:  GENERAL: Elderly, alert, cooperative  CHEST/LUNG: Clear to percussion bilaterally; No rales, rhonchi, wheezing, or rubs  HEART: Regular rate and rhythm; III/VI murmur, no rubs, or gallops  ABDOMEN: Soft, Nontender, Nondistended; Bowel sounds present  Neurol, Good concentration; Motor Strength 5/5 B/L upper and lower extremities; DTRs 2+ intact and symmetric    LABS:                        11.5   7.5   )-----------( 338      ( 13 May 2017 08:09 )             33.6     -    128<L>  |  94<L>  |  28<H>  ----------------------------<  125<H>  4.4   |  27  |  1.01    Ca    8.9      13 May 2017 08:09  Phos  3.5     05-13  Mg     2.1     05-13    Urinalysis Basic - ( 11 May 2017 17:16 )    Color: Yellow / Appearance: Clear / S.005 / pH: x  Gluc: x / Ketone: Negative  / Bili: Negative / Urobili: Negative   Blood: x / Protein: Negative / Nitrite: Negative   Leuk Esterase: Trace / RBC: 0-2 /HPF / WBC 3-5 /HPF   Sq Epi: x / Non Sq Epi: Few / Bacteria: Trace /HPF    CAPILLARY BLOOD GLUCOSE  200 (13 May 2017 16:00)  230 (13 May 2017 12:53)  158 (13 May 2017 08:17)  203 (12 May 2017 20:18)      RADIOLOGY & ADDITIONAL TESTS:  Impression: No hemodynamically significant internal carotid artery   stenosis by velocity criteria.    IMPRESSION:  Severe (greater than 70%) stenosis at origin of left internal carotid   artery.    PROCEDURE DATE:  2017    IMPRESSION:  Severe (greater than 70%) stenosis at origin of left internal carotid   artery.  BILATERAL LOWER EXTREMITY ARTERIAL DISEASE, WORSE ON THE LEFT SIDE,   LIKELY BELOW THE KNEE AND INTRINSIC TO THE FEETBILATERAL LOWER EXTREMITY ARTERIAL DISEASE, WORSE ON THE LEFT SIDE,   LIKELY BELOW THE KNEE AND INTRINSIC TO THE FEET  CT Scan:    Consultant(s) Notes Reviewed:  [x ] YES  [ ] NO  Cardiology    Care Discussed with Consultants/Other Providers [ ] YES  [ ] NO    I have discussed the studies listed above with Dr. Anderson, who states that the CT angio is more reliable than the ultrasound.  He plans to operate on the left carotid on Monday. I have discussed this with the patient and her son.      Dr. Werner's note, seen and appreciated.      Surgery on Monday.  NPO after midnight on .

## 2017-05-13 NOTE — CONSULT NOTE ADULT - PROBLEM SELECTOR RECOMMENDATION 2
Clear discrepancy of studies from 1/16/17 and 5/12/17  Follow up and care as per vascular surgeon  Neurology follow up after possible CEA

## 2017-05-13 NOTE — CONSULT NOTE ADULT - SUBJECTIVE AND OBJECTIVE BOX
Patient is a 76y old  Female who presents with a chief complaint of Brought by son for sudden onset of tongue heaviness, slurred speech since 9:00 PM (12 May 2017 08:43)      HPI:  75 y/o F, home, walks with assistance, lives with son, AAOx3,  w/ PMH of CVA ( Right frontal MCA , s/p TPA in ), small SAH ( acute in Right frontal region, 2017)  Fibromyalgia,  HTN ( Losartan HCTz 100/25),  DM2 ( Metformin 750mg BID, last HBA1c is 7.3 ), presented with tongue heaviness, fatigue, since 9;00 pm of 5/10/17. History was provided by the son at the bedside, according to him she was sitting on the couch and talking to him, when suddenly he noticed that she is having slurred speech, mumbling, tongue heaviness. As per patient the whole day yesterday was very busy as she was doing the packing for her upcoming trip. She reports that she was feeling tired, and wanted to sleep. Denies any headache trauma to head, blurring of vision, neck pain, chest pain, SOB cough, fever, abdominal pain, urinary , bowel complains. Son said that it looks like she is about to pass out, but did not lost consciousness Patient reports having body aches, bilateral knee pain, leg pain, neck pain. As per son her symptoms of pain likely due to fibromyalgia and also due to high intensity statin.     Patient was last admitted to [facility] in . with CVA , got TPA , admitted to ICU for monitoring, it was acute right MCA , later was found to have small right frontal SAH, also has left sided carotid artery stenosis with > 70 %. She was recommended to follow up with Vascular Surgeon Dr Dubois for possible left end arterectomy, cardio f/up with Dr Guzman ( as she was started on nimodipine for SAH ) , and also with Dr Wilson. Patient did not follow up with anyone, except her PCP, also she recently lost her  about 15 days ago. Son expresses the concern regarding statin . Currently since last week she is taking 20 mg of lipitor.     Fhx: No family history of strokes, mother  at age of 73 due to heart attack (11 May 2017 02:56)         Neurological Review of Systems:  No difficulty with language.  No vision loss or double vision.  No dizziness, vertigo or new hearing loss.  No difficulty with speech or swallowing.  No focal weakness.  No focal sensory changes.  No numbness or tingling in the bilateral lower extremities.  No difficulty with balance.  There is difficulty with ambulation.        MEDICATIONS  (STANDING):  aspirin  chewable 81milliGRAM(s) Oral daily  atorvastatin 40milliGRAM(s) Oral at bedtime  heparin  Injectable 5000Unit(s) SubCutaneous every 8 hours  insulin lispro (HumaLOG) corrective regimen sliding scale  SubCutaneous three times a day before meals  dextrose 5%. 1000milliLiter(s) IV Continuous <Continuous>  dextrose 50% Injectable 12.5Gram(s) IV Push once  dextrose 50% Injectable 25Gram(s) IV Push once  dextrose 50% Injectable 25Gram(s) IV Push once  pregabalin 50milliGRAM(s) Oral at bedtime    MEDICATIONS  (PRN):  acetaminophen   Tablet. 650milliGRAM(s) Oral every 6 hours PRN Mild Pain (1 - 3)  dextrose Gel 1Dose(s) Oral once PRN Blood Glucose LESS THAN 70 milliGRAM(s)/deciLiter  glucagon  Injectable 1milliGRAM(s) IntraMuscular once PRN Glucose <70 milliGRAM(s)/deciLiter    Allergies: No Known Allergies    Intolerances      PAST MEDICAL & SURGICAL HISTORY:  Type 2 diabetes mellitus  Essential hypertension  DM (diabetes mellitus)  HTN (hypertension)  Fibromyalgia  History of appendectomy  No significant past surgical history    FAMILY HISTORY:  Family history of acute myocardial infarction  Family history of cerebrovascular accident (CVA) (Sibling)    SOCIAL HISTORY: non smoker/ former smoker/ active smoker    Review of Systems:  Constitutional: No generalized weakness. No fevers or chills.                    Eyes, Ears, Mouth, Throat: No vision loss   Respiratory: No shortness of breath or cough.                                Cardiovascular: No chest pain or palpitations  Gastrointestinal: No nausea or vomiting.                                         Genitourinary: No urinary incontinence or burning on urination.  Musculoskeletal: No joint pain.                                                           Dermatologic: No rash.  Neurological: as per HPI                                                                      Psychiatric: No behavioral problems.  Endocrine: No known hypoglycemia.               Hematologic/Lymphatic: No easy bleeding.    O:  Vital Signs Last 24 Hrs  T(C): 36.3, Max: 36.7 (05-12 @ 23:33)  T(F): 97.4, Max: 98 (05-12 @ 23:33)  HR: 80 (60 - 96)  BP: 109/71 (92/55 - 127/57)  BP(mean): --  RR: 18 (18 - 18)  SpO2: 99% (96% - 100%)    General Exam:   General appearance: No acute distress, seating comfortable in the chair.                Cardiovascular: Pedal dorsalis pulses diminished bilaterally (L>R)    Mental Status: Orientated to self, date and place.  Attention intact.  No dysarthria, aphasia or neglect.  Knowledge intact.  Registration intact.  Short and long term memory grossly intact.      Cranial Nerves: CN I - not tested.  PERRL, EOMI, VFF, no nystagmus or diplopia.  No APD.  Fundi not visualized.  CN V1-3 intact to light touch and pinprick.  No facial asymmetry.  Hearing intact to finger rub bilaterally.  Tongue, uvula and palate midline.  Sternocleidomastoid and Trapezius intact bilaterally.    Motor:   Tone: normal.                  Strength intact throughout  No pronator drift bilaterally                      No dysmetria on finger-nose-finger or heel-shin-heel  No truncal ataxia.  No resting, postural or action tremor.  No myoclonus.    Sensation: intact to light touch, pinprick, vibration and proprioception    Deep Tendon Reflexes: 1+ bilateral biceps, triceps, brachioradialis, knee and ankle  Toes flexor bilaterally    Gait: Unsteady and stiff.  Romberg - negative.    Other:     LABS:                        11.5   7.5   )-----------( 338      ( 13 May 2017 08:09 )             33.6     05-13    128<L>  |  94<L>  |  28<H>  ----------------------------<  125<H>  4.4   |  27  |  1.01    Ca    8.9      13 May 2017 08:09  Phos  3.5     05-13  Mg     2.1     05-13        Urinalysis Basic - ( 11 May 2017 17:16 )    Color: Yellow / Appearance: Clear / S.005 / pH: x  Gluc: x / Ketone: Negative  / Bili: Negative / Urobili: Negative   Blood: x / Protein: Negative / Nitrite: Negative   Leuk Esterase: Trace / RBC: 0-2 /HPF / WBC 3-5 /HPF   Sq Epi: x / Non Sq Epi: Few / Bacteria: Trace /HPF          RADIOLOGY & ADDITIONAL STUDIES:    MRI BRAIN WITHOUT CONTRAST (17) IMPRESSION: No evidence of acute infarct.    Ultrasound of the carotid arteries. (17)   IMPRESSION:     1.  Right carotid system:  Atherosclerotic plaque  No hemodynamically   significant stenosis is found.     2.  Left carotidsystem: Arteriosclerotic plaque. Elevation of the peak   systolic colitis. The  Carotid artery corresponding to 50-69% degree of stenosis. The remainder   of the examination reveals no hemodynamically significant stenosis.        Carotid duplex Doppler ultrasound (17)  Impression: No hemodynamically significant internal carotid artery   stenosis by velocity criteria.    US DPLX LWR EXT ARTS COMPL BI IMPRESSION:     BILATERAL LOWER EXTREMITY ARTERIAL DISEASE, WORSE ON THE LEFT SIDE,   LIKELY BELOW THE KNEE AND INTRINSIC TO THE FEET.  EKG:  tele:  TTE:  EEG:

## 2017-05-13 NOTE — CONSULT NOTE ADULT - ASSESSMENT
This is middle aged female with history, symptoms, findings, and diagnostic testing consistent with  TIA  Near-syncope  Carotid stenosis  Peripheral vascular disease

## 2017-05-14 LAB
ABO RH CONFIRMATION: SIGNIFICANT CHANGE UP
ANION GAP SERPL CALC-SCNC: 11 MMOL/L — SIGNIFICANT CHANGE UP (ref 5–17)
APTT BLD: 62.9 SEC — HIGH (ref 27.5–37.4)
BUN SERPL-MCNC: 27 MG/DL — HIGH (ref 7–18)
CALCIUM SERPL-MCNC: 9.6 MG/DL — SIGNIFICANT CHANGE UP (ref 8.4–10.5)
CHLORIDE SERPL-SCNC: 99 MMOL/L — SIGNIFICANT CHANGE UP (ref 96–108)
CO2 SERPL-SCNC: 25 MMOL/L — SIGNIFICANT CHANGE UP (ref 22–31)
CREAT SERPL-MCNC: 0.8 MG/DL — SIGNIFICANT CHANGE UP (ref 0.5–1.3)
GLUCOSE SERPL-MCNC: 116 MG/DL — HIGH (ref 70–99)
HCT VFR BLD CALC: 33.2 % — LOW (ref 34.5–45)
HGB BLD-MCNC: 11.5 G/DL — SIGNIFICANT CHANGE UP (ref 11.5–15.5)
INR BLD: 1 RATIO — SIGNIFICANT CHANGE UP (ref 0.88–1.16)
MAGNESIUM SERPL-MCNC: 2.4 MG/DL — SIGNIFICANT CHANGE UP (ref 1.6–2.6)
MCHC RBC-ENTMCNC: 29.1 PG — SIGNIFICANT CHANGE UP (ref 27–34)
MCHC RBC-ENTMCNC: 34.7 GM/DL — SIGNIFICANT CHANGE UP (ref 32–36)
MCV RBC AUTO: 83.9 FL — SIGNIFICANT CHANGE UP (ref 80–100)
PHOSPHATE SERPL-MCNC: 3.7 MG/DL — SIGNIFICANT CHANGE UP (ref 2.5–4.5)
PLATELET # BLD AUTO: 337 K/UL — SIGNIFICANT CHANGE UP (ref 150–400)
POTASSIUM SERPL-MCNC: 4.6 MMOL/L — SIGNIFICANT CHANGE UP (ref 3.5–5.3)
POTASSIUM SERPL-SCNC: 4.6 MMOL/L — SIGNIFICANT CHANGE UP (ref 3.5–5.3)
PROTHROM AB SERPL-ACNC: 10.9 SEC — SIGNIFICANT CHANGE UP (ref 9.8–12.7)
RBC # BLD: 3.95 M/UL — SIGNIFICANT CHANGE UP (ref 3.8–5.2)
RBC # FLD: 13.3 % — SIGNIFICANT CHANGE UP (ref 10.3–14.5)
SODIUM SERPL-SCNC: 135 MMOL/L — SIGNIFICANT CHANGE UP (ref 135–145)
WBC # BLD: 7.1 K/UL — SIGNIFICANT CHANGE UP (ref 3.8–10.5)
WBC # FLD AUTO: 7.1 K/UL — SIGNIFICANT CHANGE UP (ref 3.8–10.5)

## 2017-05-14 RX ORDER — SODIUM CHLORIDE 9 MG/ML
1000 INJECTION INTRAMUSCULAR; INTRAVENOUS; SUBCUTANEOUS
Qty: 0 | Refills: 0 | Status: DISCONTINUED | OUTPATIENT
Start: 2017-05-14 | End: 2017-05-15

## 2017-05-14 RX ORDER — SENNA PLUS 8.6 MG/1
2 TABLET ORAL AT BEDTIME
Qty: 0 | Refills: 0 | Status: DISCONTINUED | OUTPATIENT
Start: 2017-05-14 | End: 2017-05-15

## 2017-05-14 RX ORDER — INSULIN LISPRO 100/ML
5 VIAL (ML) SUBCUTANEOUS ONCE
Qty: 0 | Refills: 0 | Status: COMPLETED | OUTPATIENT
Start: 2017-05-14 | End: 2017-05-14

## 2017-05-14 RX ORDER — DOCUSATE SODIUM 100 MG
100 CAPSULE ORAL
Qty: 0 | Refills: 0 | Status: DISCONTINUED | OUTPATIENT
Start: 2017-05-14 | End: 2017-05-15

## 2017-05-14 RX ORDER — INSULIN GLARGINE 100 [IU]/ML
4 INJECTION, SOLUTION SUBCUTANEOUS ONCE
Qty: 0 | Refills: 0 | Status: COMPLETED | OUTPATIENT
Start: 2017-05-14 | End: 2017-05-14

## 2017-05-14 RX ADMIN — Medication 1: at 08:26

## 2017-05-14 RX ADMIN — HEPARIN SODIUM 5000 UNIT(S): 5000 INJECTION INTRAVENOUS; SUBCUTANEOUS at 14:13

## 2017-05-14 RX ADMIN — Medication 100 MILLIGRAM(S): at 17:23

## 2017-05-14 RX ADMIN — HEPARIN SODIUM 5000 UNIT(S): 5000 INJECTION INTRAVENOUS; SUBCUTANEOUS at 06:04

## 2017-05-14 RX ADMIN — Medication 2: at 17:22

## 2017-05-14 RX ADMIN — Medication 50 MILLIGRAM(S): at 22:04

## 2017-05-14 RX ADMIN — Medication 3: at 13:05

## 2017-05-14 RX ADMIN — INSULIN GLARGINE 4 UNIT(S): 100 INJECTION, SOLUTION SUBCUTANEOUS at 23:09

## 2017-05-14 RX ADMIN — Medication 650 MILLIGRAM(S): at 20:12

## 2017-05-14 RX ADMIN — HEPARIN SODIUM 5000 UNIT(S): 5000 INJECTION INTRAVENOUS; SUBCUTANEOUS at 22:04

## 2017-05-14 RX ADMIN — Medication 81 MILLIGRAM(S): at 13:06

## 2017-05-14 RX ADMIN — Medication 5 UNIT(S): at 22:31

## 2017-05-14 RX ADMIN — SODIUM CHLORIDE 60 MILLILITER(S): 9 INJECTION INTRAMUSCULAR; INTRAVENOUS; SUBCUTANEOUS at 23:00

## 2017-05-14 RX ADMIN — Medication 650 MILLIGRAM(S): at 19:23

## 2017-05-14 RX ADMIN — SENNA PLUS 2 TABLET(S): 8.6 TABLET ORAL at 22:04

## 2017-05-14 RX ADMIN — ATORVASTATIN CALCIUM 40 MILLIGRAM(S): 80 TABLET, FILM COATED ORAL at 22:04

## 2017-05-14 NOTE — PROGRESS NOTE ADULT - SUBJECTIVE AND OBJECTIVE BOX
Vascular Surgery Pre-Op Note    Dx: Left internal carotid stenosis & TIA  Sx: Left Carotid Endartectomy  Surgeon: Dr. Brown  Labs:                       11.5   7.1   )-----------( 337      ( 14 May 2017 06:24 )             33.2       135  |  99  |  27<H>  ----------------------------<  116<H>  4.6   |  25  |  0.80    Ca    9.6      14 May 2017 06:24  Phos  3.7     05-14  Mg     2.4     05-14    PT: 10.9  INR: 1.00  PTT: 62.9    Blood: Pending for the AM    T(F): 99, Max: 99 (05-14 @ 07:55)  HR: 81 (71 - 95)  BP: 115/65 (102/74 - 136/56)  RR: 16 (16 - 18)  SpO2: 99% (99% - 100%)  Wt(kg): --

## 2017-05-14 NOTE — PROGRESS NOTE ADULT - SUBJECTIVE AND OBJECTIVE BOX
CC: Patient is a 76y old  Female who presents with a chief complaint of Brought by son for sudden onset of tongue heaviness, slurred speech since 9:00 PM (12 May 2017 08:43)  Admit Dx: Carotid stenosis  Overnight Events: no acute events  Pt seen/examined at the bedside, no new complaints    Vitals:  T(C): 36.7, Max: 37.1 (05-13 @ 20:36)  HR: 95 (71 - 95)  BP: 111/64 (102/74 - 136/56)  RR: 18 (17 - 18)  SpO2: 99% (99% - 100%)      PHYSICAL EXAM:  GENERAL: NAD, well-groomed, well-developed  HEAD:  Atraumatic, Normocephalic  EYES: EOMI, PERRLA, conjunctiva and sclera clear  CHEST/LUNG: Clear to auscultation bilaterally; No w/r/r  HEART: S1/S2+  No m/g/r  ABDOMEN: Soft, Nontender, Nondistended; Bowel sounds present  EXTREMITIES:  2+ Peripheral Pulses, No clubbing, cyanosis, or edema  NUERO:  AAO X3, No focal deficits    LABS:                        11.5   7.5   )-----------( 338      ( 13 May 2017 08:09 )             33.6     05-13    128<L>  |  94<L>  |  28<H>  ----------------------------<  125<H>  4.4   |  27  |  1.01    Ca    8.9      13 May 2017 08:09  Phos  3.5     05-13  Mg     2.1     05-13 CC: Patient is a 76y old  Female who presents with a chief complaint of Brought by son for sudden onset of tongue heaviness, slurred speech since 9:00 PM (12 May 2017 08:43)  Admit Dx: Carotid stenosis  Overnight Events: no acute events  Pt seen/examined at the bedside, no new complaints    Vitals:  T(C): 36.7, Max: 37.1 (05-13 @ 20:36)  HR: 95 (71 - 95)  BP: 111/64 (102/74 - 136/56)  RR: 18 (17 - 18)  SpO2: 99% (99% - 100%)      PHYSICAL EXAM:  GENERAL: NAD, well-groomed, well-developed  HEAD:  Atraumatic, Normocephalic  EYES: EOMI, PERRLA, conjunctiva and sclera clear  CHEST/LUNG: Clear to auscultation bilaterally; No w/r/r  HEART: +systolic murmer  ABDOMEN: Soft, Nontender, Nondistended; Bowel sounds present  EXTREMITIES:  2+ Peripheral Pulses, No clubbing, cyanosis, or edema  NUERO:  AAO X3, No focal deficits    LABS:                        11.5   7.5   )-----------( 338      ( 13 May 2017 08:09 )             33.6     05-13    128<L>  |  94<L>  |  28<H>  ----------------------------<  125<H>  4.4   |  27  |  1.01    Ca    8.9      13 May 2017 08:09  Phos  3.5     05-13  Mg     2.1     05-13

## 2017-05-14 NOTE — PROGRESS NOTE ADULT - PROBLEM SELECTOR PLAN 1
-Symptoms (onset of symptoms at 9:00 pm , with tongue heaviness, feeling tired) resolved in ED , NIHS stroke scale is 0  -CT head no acute changes , chronic changes present   -received aspirin 325, Lipitor 80 mg , 1 L NS bolus.   -EKG NSR at 88 bpm , RBBB  -c/w telemetry  -cardiac enzymes (-) for ischemia  -neurology Dr. Goldsmith consulted  -pt had previous full workup with carotid stenosis  -Vascular Dr. Ritchie: to perform CEA on Monday (NPO after midnight Sunday night)  -Dr. Ritchie awaiting medical clearance by cardio (Dr. Brooks)  -MRI:No evidence of acute infarct; Chronic lacunar infarcts in the right corona radiata, bilateral thalami and left basal ganglia.  -CD: shows no evidence of stenosis, but previous CD showing >70% stenosis.  -No need for TTE as pt had TTE and KEDAR in Jan.  -Cardio: She is low to moderate risk for perioperative complications.  Benefit of surgery outweigh  risks.  Would proceed with surgery if needed.   -c/w lipitor 40 and ASA 81 at this time.

## 2017-05-14 NOTE — PROGRESS NOTE ADULT - PROBLEM SELECTOR PLAN 1
1. Plan for Left CEA tomorrow morning 5/15/17  2. NPO after midnight  3. AM T&S  4. 2u PRBCs on hold  5. Cleared by medical team & by cardiology  6. Consent in chart  7. IVF when NPO

## 2017-05-15 ENCOUNTER — APPOINTMENT (OUTPATIENT)
Dept: VASCULAR SURGERY | Facility: HOSPITAL | Age: 77
End: 2017-05-15

## 2017-05-15 ENCOUNTER — TRANSCRIPTION ENCOUNTER (OUTPATIENT)
Age: 77
End: 2017-05-15

## 2017-05-15 ENCOUNTER — RESULT REVIEW (OUTPATIENT)
Age: 77
End: 2017-05-15

## 2017-05-15 DIAGNOSIS — R10.819 ABDOMINAL TENDERNESS, UNSPECIFIED SITE: ICD-10-CM

## 2017-05-15 DIAGNOSIS — I63.9 CEREBRAL INFARCTION, UNSPECIFIED: ICD-10-CM

## 2017-05-15 DIAGNOSIS — M79.1 MYALGIA: ICD-10-CM

## 2017-05-15 LAB
ANION GAP SERPL CALC-SCNC: 6 MMOL/L — SIGNIFICANT CHANGE UP (ref 5–17)
APTT BLD: 43.7 SEC — HIGH (ref 27.5–37.4)
BUN SERPL-MCNC: 23 MG/DL — HIGH (ref 7–18)
CALCIUM SERPL-MCNC: 9.3 MG/DL — SIGNIFICANT CHANGE UP (ref 8.4–10.5)
CHLORIDE SERPL-SCNC: 105 MMOL/L — SIGNIFICANT CHANGE UP (ref 96–108)
CO2 SERPL-SCNC: 26 MMOL/L — SIGNIFICANT CHANGE UP (ref 22–31)
CREAT SERPL-MCNC: 0.82 MG/DL — SIGNIFICANT CHANGE UP (ref 0.5–1.3)
GLUCOSE SERPL-MCNC: 115 MG/DL — HIGH (ref 70–99)
HCT VFR BLD CALC: 32 % — LOW (ref 34.5–45)
HGB BLD-MCNC: 11.1 G/DL — LOW (ref 11.5–15.5)
INR BLD: 1 RATIO — SIGNIFICANT CHANGE UP (ref 0.88–1.16)
MAGNESIUM SERPL-MCNC: 2.3 MG/DL — SIGNIFICANT CHANGE UP (ref 1.6–2.6)
MCHC RBC-ENTMCNC: 28.9 PG — SIGNIFICANT CHANGE UP (ref 27–34)
MCHC RBC-ENTMCNC: 34.6 GM/DL — SIGNIFICANT CHANGE UP (ref 32–36)
MCV RBC AUTO: 83.7 FL — SIGNIFICANT CHANGE UP (ref 80–100)
PHOSPHATE SERPL-MCNC: 3.6 MG/DL — SIGNIFICANT CHANGE UP (ref 2.5–4.5)
PLATELET # BLD AUTO: 314 K/UL — SIGNIFICANT CHANGE UP (ref 150–400)
POTASSIUM SERPL-MCNC: 4.5 MMOL/L — SIGNIFICANT CHANGE UP (ref 3.5–5.3)
POTASSIUM SERPL-SCNC: 4.5 MMOL/L — SIGNIFICANT CHANGE UP (ref 3.5–5.3)
PROTHROM AB SERPL-ACNC: 10.9 SEC — SIGNIFICANT CHANGE UP (ref 9.8–12.7)
RBC # BLD: 3.83 M/UL — SIGNIFICANT CHANGE UP (ref 3.8–5.2)
RBC # FLD: 13.6 % — SIGNIFICANT CHANGE UP (ref 10.3–14.5)
SODIUM SERPL-SCNC: 137 MMOL/L — SIGNIFICANT CHANGE UP (ref 135–145)
WBC # BLD: 6.9 K/UL — SIGNIFICANT CHANGE UP (ref 3.8–10.5)
WBC # FLD AUTO: 6.9 K/UL — SIGNIFICANT CHANGE UP (ref 3.8–10.5)

## 2017-05-15 PROCEDURE — 88304 TISSUE EXAM BY PATHOLOGIST: CPT | Mod: 26

## 2017-05-15 PROCEDURE — 88311 DECALCIFY TISSUE: CPT | Mod: 26

## 2017-05-15 PROCEDURE — 99291 CRITICAL CARE FIRST HOUR: CPT

## 2017-05-15 RX ORDER — HEPARIN SODIUM 5000 [USP'U]/ML
5000 INJECTION INTRAVENOUS; SUBCUTANEOUS ONCE
Qty: 0 | Refills: 0 | Status: COMPLETED | OUTPATIENT
Start: 2017-05-15 | End: 2017-05-15

## 2017-05-15 RX ORDER — ASPIRIN/CALCIUM CARB/MAGNESIUM 324 MG
81 TABLET ORAL DAILY
Qty: 0 | Refills: 0 | Status: DISCONTINUED | OUTPATIENT
Start: 2017-05-15 | End: 2017-05-24

## 2017-05-15 RX ORDER — ACETAMINOPHEN 500 MG
650 TABLET ORAL EVERY 6 HOURS
Qty: 0 | Refills: 0 | Status: DISCONTINUED | OUTPATIENT
Start: 2017-05-15 | End: 2017-05-19

## 2017-05-15 RX ORDER — SODIUM CHLORIDE 9 MG/ML
1000 INJECTION INTRAMUSCULAR; INTRAVENOUS; SUBCUTANEOUS
Qty: 0 | Refills: 0 | Status: DISCONTINUED | OUTPATIENT
Start: 2017-05-15 | End: 2017-05-16

## 2017-05-15 RX ORDER — INSULIN LISPRO 100/ML
VIAL (ML) SUBCUTANEOUS AT BEDTIME
Qty: 0 | Refills: 0 | Status: DISCONTINUED | OUTPATIENT
Start: 2017-05-15 | End: 2017-05-24

## 2017-05-15 RX ORDER — INSULIN LISPRO 100/ML
VIAL (ML) SUBCUTANEOUS
Qty: 0 | Refills: 0 | Status: DISCONTINUED | OUTPATIENT
Start: 2017-05-15 | End: 2017-05-15

## 2017-05-15 RX ORDER — SIMVASTATIN 20 MG/1
20 TABLET, FILM COATED ORAL AT BEDTIME
Qty: 0 | Refills: 0 | Status: DISCONTINUED | OUTPATIENT
Start: 2017-05-15 | End: 2017-05-22

## 2017-05-15 RX ORDER — HEPARIN SODIUM 5000 [USP'U]/ML
5000 INJECTION INTRAVENOUS; SUBCUTANEOUS EVERY 8 HOURS
Qty: 0 | Refills: 0 | Status: DISCONTINUED | OUTPATIENT
Start: 2017-05-15 | End: 2017-05-24

## 2017-05-15 RX ORDER — FENTANYL CITRATE 50 UG/ML
25 INJECTION INTRAVENOUS
Qty: 0 | Refills: 0 | Status: DISCONTINUED | OUTPATIENT
Start: 2017-05-15 | End: 2017-05-16

## 2017-05-15 RX ORDER — HYDROMORPHONE HYDROCHLORIDE 2 MG/ML
1 INJECTION INTRAMUSCULAR; INTRAVENOUS; SUBCUTANEOUS ONCE
Qty: 0 | Refills: 0 | Status: DISCONTINUED | OUTPATIENT
Start: 2017-05-15 | End: 2017-05-15

## 2017-05-15 RX ORDER — PANTOPRAZOLE SODIUM 20 MG/1
40 TABLET, DELAYED RELEASE ORAL
Qty: 0 | Refills: 0 | Status: DISCONTINUED | OUTPATIENT
Start: 2017-05-15 | End: 2017-05-24

## 2017-05-15 RX ORDER — DOCUSATE SODIUM 100 MG
100 CAPSULE ORAL
Qty: 0 | Refills: 0 | Status: DISCONTINUED | OUTPATIENT
Start: 2017-05-15 | End: 2017-05-24

## 2017-05-15 RX ORDER — ATORVASTATIN CALCIUM 80 MG/1
40 TABLET, FILM COATED ORAL AT BEDTIME
Qty: 0 | Refills: 0 | Status: DISCONTINUED | OUTPATIENT
Start: 2017-05-15 | End: 2017-05-15

## 2017-05-15 RX ORDER — MORPHINE SULFATE 50 MG/1
2 CAPSULE, EXTENDED RELEASE ORAL ONCE
Qty: 0 | Refills: 0 | Status: DISCONTINUED | OUTPATIENT
Start: 2017-05-15 | End: 2017-05-15

## 2017-05-15 RX ADMIN — PANTOPRAZOLE SODIUM 40 MILLIGRAM(S): 20 TABLET, DELAYED RELEASE ORAL at 19:42

## 2017-05-15 RX ADMIN — MORPHINE SULFATE 2 MILLIGRAM(S): 50 CAPSULE, EXTENDED RELEASE ORAL at 17:01

## 2017-05-15 RX ADMIN — HYDROMORPHONE HYDROCHLORIDE 1 MILLIGRAM(S): 2 INJECTION INTRAMUSCULAR; INTRAVENOUS; SUBCUTANEOUS at 21:27

## 2017-05-15 RX ADMIN — Medication 100 MILLIGRAM(S): at 05:37

## 2017-05-15 RX ADMIN — HEPARIN SODIUM 5000 UNIT(S): 5000 INJECTION INTRAVENOUS; SUBCUTANEOUS at 18:54

## 2017-05-15 RX ADMIN — HYDROMORPHONE HYDROCHLORIDE 1 MILLIGRAM(S): 2 INJECTION INTRAMUSCULAR; INTRAVENOUS; SUBCUTANEOUS at 21:08

## 2017-05-15 RX ADMIN — Medication 81 MILLIGRAM(S): at 18:54

## 2017-05-15 RX ADMIN — Medication 100 MILLIGRAM(S): at 18:54

## 2017-05-15 RX ADMIN — SIMVASTATIN 20 MILLIGRAM(S): 20 TABLET, FILM COATED ORAL at 21:08

## 2017-05-15 RX ADMIN — Medication 50 MILLIGRAM(S): at 21:08

## 2017-05-15 RX ADMIN — MORPHINE SULFATE 2 MILLIGRAM(S): 50 CAPSULE, EXTENDED RELEASE ORAL at 18:16

## 2017-05-15 NOTE — PROGRESS NOTE ADULT - SUBJECTIVE AND OBJECTIVE BOX
Neurology Follow up note    Name  FORTINO RUDD    HPI:  75 y/o F, home, walks with assistance, lives with son, AAOx3,  w/ PMH of CVA ( Right frontal MCA , s/p TPA in ), small SAH ( acute in Right frontal region, 2017)  Fibromyalgia,  HTN ( Losartan HCTz 100/25),  DM2 ( Metformin 750mg BID, last HBA1c is 7.3 ), presented with tongue heaviness, fatigue, since 9;00 pm of 5/10/17. History was provided by the son at the bedside, according to him she was sitting on the couch and talking to him, when suddenly he noticed that she is having slurred speech, mumbling, tongue heaviness. As per patient the whole day yesterday was very busy as she was doing the packing for her upcoming trip. She reports that she was feeling tired, and wanted to sleep. Denies any headache trauma to head, blurring of vision, neck pain, chest pain, SOB cough, fever, abdominal pain, urinary , bowel complains. Son said that it looks like she is about to pass out, but did not lost consciousness Patient reports having body aches, bilateral knee pain, leg pain, neck pain. As per son her symptoms of pain likely due to fibromyalgia and also due to high intensity statin.     Patient was last admitted to [facility] in . with CVA , got TPA , admitted to ICU for monitoring, it was acute right MCA , later was found to have small right frontal SAH, also has left sided carotid artery stenosis with > 70 %. She was recommended to follow up with Vascular Surgeon Dr Dubois for possible left end arterectomy, cardio f/up with Dr Guzman ( as she was started on nimodipine for SAH ) , and also with Dr Wilson. Patient did not follow up with anyone, except her PCP, also she recently lost her  about 15 days ago. Son expresses the concern regarding statin . Currently since last week she is taking 20 mg of lipitor.     Fhx: No family history of strokes, mother  at age of 73 due to heart attack (11 May 2017 02:56)      Interval History -        Subjective:    Review of Systems:  Constitutional:        Eyes, Ears, Mouth, Throat:   Respiratory:                            Cardiovascular:   Gastrointestinal:                                     Genitourinary:   Musculoskeletal:                                    Dermatologic:   Neurological: as per above                                                                 Psychiatric:   Endocrine:              Hematologic/Lymphatic:     MEDICATIONS  (STANDING):  heparin  Injectable 5000Unit(s) SubCutaneous every 8 hours  sodium chloride 0.9%. 1000milliLiter(s) IV Continuous <Continuous>  aspirin  chewable 81milliGRAM(s) Oral daily  atorvastatin 40milliGRAM(s) Oral at bedtime  pregabalin 50milliGRAM(s) Oral at bedtime  docusate sodium 100milliGRAM(s) Oral two times a day  insulin lispro (HumaLOG) corrective regimen sliding scale  SubCutaneous at bedtime  pantoprazole    Tablet 40milliGRAM(s) Oral before breakfast  morphine  - Injectable 2milliGRAM(s) IV Push once    MEDICATIONS  (PRN):  fentaNYL    Injectable 25MICROGram(s) IV Push every 5 minutes PRN Moderate Pain  acetaminophen   Tablet. 650milliGRAM(s) Oral every 6 hours PRN Mild Pain (1 - 3)  oxyCODONE  5 mG/acetaminophen 325 mG 1Tablet(s) Oral every 6 hours PRN Moderate Pain (4 - 6)  oxyCODONE  5 mG/acetaminophen 325 mG 2Tablet(s) Oral every 6 hours PRN Severe Pain (7 - 10)      Allergies    No Known Allergies    Intolerances        Objective:   Vital Signs Last 24 Hrs  T(C): 36.6, Max: 37.1 (05-15 @ 15:08)  T(F): 97.8, Max: 98.8 (05-15 @ 15:08)  HR: 109 (91 - 119)  BP: 92/68 (92/68 - 184/63)  BP(mean): 86 (77 - 103)  RR: 17 (12 - 19)  SpO2: 99% (99% - 100%)    General Exam:   General appearance: No acute distress                 Cardiovascular: Pedal dorsalis pulses intact bilaterally    Neurological Exam:  Mental Status: Orientated to self, date and place.  Attention intact.  No dysarthria, aphasia or neglect.  Knowledge intact.  Registration intact.  Short and long term memory grossly intact.      Cranial Nerves: CN I - not tested.  PERRL, EOMI, VFF, no nystagmus or diplopia.  No APD.  Fundi not visualized bilaterally.  CN V1-3 intact to light touch and pinprick.  No facial asymmetry.  Hearing intact to finger rub bilaterally.  Tongue, uvula and palate midline.  Sternocleidomastoid and Trapezius intact bilaterally.    Motor:   Tone: normal.                  Strength: intact throughout  Pronator drift: none                 Dysmeria: None to finger-nose-finger or heel-shin-heel  No truncal ataxia.    Tremor: No resting, postural or action tremor.  No myoclonus.    Sensation: intact to light touch, pinprick, vibration and proprioception    Deep Tendon Reflexes: 1+ bilateral biceps, triceps, brachioradialis, knee and ankle  Toes flexor bilaterally    Gait: normal and stable.      Other:    05-15    137  |  105  |  23<H>  ----------------------------<  115<H>  4.5   |  26  |  0.82    Ca    9.3      15 May 2017 07:24  Phos  3.6     05-15  Mg     2.3     05-15      05-15    137  |  105  |  23<H>  ----------------------------<  115<H>  4.5   |  26  |  0.82    Ca    9.3      15 May 2017 07:24  Phos  3.6     05-15  Mg     2.3     05-15 Neurology Follow up note    S: 75 y/o F with right MCA stroke s/p IVtpa 2017 s/p PT with recovery to baseline s/p left  today, doing well, c/o pain at surgical site, otherwise no neuro c/o currently.  Pt had myalgias on lipitor 40mg.    Review of Systems:  Respiratory:     no cough                       Cardiovascular: no CP  Neurological: as per above                                                                   MEDICATIONS  (STANDING):  heparin  Injectable 5000Unit(s) SubCutaneous every 8 hours  sodium chloride 0.9%. 1000milliLiter(s) IV Continuous <Continuous>  aspirin  chewable 81milliGRAM(s) Oral daily  atorvastatin 40milliGRAM(s) Oral at bedtime  pregabalin 50milliGRAM(s) Oral at bedtime  docusate sodium 100milliGRAM(s) Oral two times a day  insulin lispro (HumaLOG) corrective regimen sliding scale  SubCutaneous at bedtime  pantoprazole    Tablet 40milliGRAM(s) Oral before breakfast  morphine  - Injectable 2milliGRAM(s) IV Push once    MEDICATIONS  (PRN):  fentaNYL    Injectable 25MICROGram(s) IV Push every 5 minutes PRN Moderate Pain  acetaminophen   Tablet. 650milliGRAM(s) Oral every 6 hours PRN Mild Pain (1 - 3)  oxyCODONE  5 mG/acetaminophen 325 mG 1Tablet(s) Oral every 6 hours PRN Moderate Pain (4 - 6)  oxyCODONE  5 mG/acetaminophen 325 mG 2Tablet(s) Oral every 6 hours PRN Severe Pain (7 - 10)      Allergies    No Known Allergies    Intolerances        Objective:   Vital Signs Last 24 Hrs  T(C): 36.6, Max: 37.1 (05-15 @ 15:08)  T(F): 97.8, Max: 98.8 (05-15 @ 15:08)  HR: 109 (91 - 119)  BP: 92/68 (92/68 - 184/63)  BP(mean): 86 (77 - 103)  RR: 17 (12 - 19)  SpO2: 99% (99% - 100%)    General Exam:   General appearance: No acute distress                 Cardiovascular: Pedal dorsalis pulses intact bilaterally    Neurological Exam:  Mental Status: Orientated to self, date and place.  Attention intact.  No dysarthria, aphasia or neglect.  Knowledge intact.  Registration intact.     Cranial Nerves: CN I - not tested.  PERRL, EOMI, VFF, no nystagmus or diplopia.  No APD.  Fundi not visualized bilaterally.  CN V1-3 intact to light touch and pinprick.  No facial asymmetry.  Hearing intact to finger rub bilaterally.  Tongue, uvula and palate midline.  Sternocleidomastoid and Trapezius intact bilaterally.    Motor:   Tone: normal.                  Strength: intact throughout  Pronator drift: none                 Dysmeria: None to finger-nose-finger or heel-shin-heel    Sensation: intact to light touch, pinprick    Deep Tendon Reflexes: 1+ bilateral biceps, triceps, brachioradialis, knee and ankle  Toes flexor on right and extensor on left    Gait: deferred    Labs:    05-15    137  |  105  |  23<H>  ----------------------------<  115<H>  4.5   |  26  |  0.82    Ca    9.3      15 May 2017 07:24  Phos  3.6     05-15  Mg     2.3     05-15      05-15    137  |  105  |  23<H>  ----------------------------<  115<H>  4.5   |  26  |  0.82    Ca    9.3      15 May 2017 07:24  Phos  3.6     05-15  Mg     2.3     05-15

## 2017-05-15 NOTE — PROGRESS NOTE ADULT - ASSESSMENT
77 y/o F, home, walks with assistance, lives with son, AAOx3,  w/ PMH of CVA ( Right frontal MCA , s/p TPA in Jan2017), small SAH ( acute in Right frontal region, jan 2017)  Fibromyalgia,  HTN ( Losartan HCTz 100/25),  DM2 ( Metformin 750mg BID, last HBA1c is 7.3 ), presented with tongue heaviness, fatigue, since 9;00 pm of 5/10/17. Patient symptoms resolved in ED , just complaining of mild tiredness and wanted to sleep. Admitted for TIA.     In ED vitals signs stable, BP normal, Fingerstick is 197, CT head shows No acute intracranial hemorrhage, mass effect, or CT evidence of an acute  transcortical infarct. Moderate chronic ischemic changes in the frontoparietal white matter. Old  right corona radiata, bilateral thalamic, and left occipital cortical  infarcts. Got aspirin 325, lipitor 80 mg , 1 L NS bolus. EKG NSR at 88 bpm , RBB . Labs showed Hb of 11.3, Troponin x 1 negative Na of 123 , Glucose 147, corrected 125.

## 2017-05-15 NOTE — CONSULT NOTE ADULT - ASSESSMENT
Patient is a 75 y/o F, home, walks with assistance, lives with son, AAOx3,  w/ PMH of CVA ( Right frontal MCA , s/p TPA in Jan2017), small SAH ( acute in Right frontal region, Jan 2017)  Fibromyalgia,  HTN ( Losartan HCTz 100/25),  DM2 ( Metformin 750mg BID, last HBA1c is 7.3 ), presented with TIA and found to have 70% stenosis of left carotid artery requiring carotid artery endarterectomy today. Admit to ICU for post op monitoring.   1. POD 0  - patient extubated, stable speaking in full sentences, able to move all extremities   -  will repeat CBC, BMP, lactate post op   - will restart medications   - diet to be restarted: Clears; advance as tolerated  - pain management with Tylenol fentanyl and percocet   - aspiration precautions     2. h/o CVA and TIA   - continue  aspirin, statin and lopressor as per guidelines   - PT evaluation     3. HTN   - continue home meds     4. DM   - A1c is 7.3  - continue HSS     5. Prophylaxis   - sc heparin for DVT ppx to be started in the evening   - po protonix for GI ppx while in ICU Patient is a 77 y/o F, home, walks with assistance, lives with son, AAOx3,  w/ PMH of CVA ( Right frontal MCA , s/p TPA in Jan2017), small SAH ( acute in Right frontal region, Jan 2017)  Fibromyalgia,  HTN ( Losartan HCTz 100/25),  DM2 ( Metformin 750mg BID, last HBA1c is 7.3 ), presented with TIA and found to have 70% stenosis of left carotid artery requiring carotid artery endarterectomy today. Admit to ICU for post op monitoring.   1. POD 0  - patient extubated, stable speaking in full sentences, able to move all extremities   -  will repeat CBC, BMP, lactate post op   - will restart medications   - diet to be restarted: Clears; advance as tolerated  - pain management with Tylenol fentanyl and percocet   - aspiration precautions     2. h/o CVA and TIA   - continue  aspirin, statin and lopressor as per guidelines   - PT evaluation     3. HTN   - continue home meds     4. DM   - A1c is 7.3  - continue HSS     5. Prophylaxis   - sc heparin for DVT ppx to be started in the evening. Instructed RN to place SCD in the meanwhile    - po protonix for GI ppx while in ICU Patient is a 77 y/o F, home, walks with assistance, lives with son, AAOx3,  w/ PMH of CVA ( Right frontal MCA , s/p TPA in Jan2017), small SAH ( acute in Right frontal region, Jan 2017)  Fibromyalgia,  HTN ( Losartan HCTz 100/25),  DM2 ( Metformin 750mg BID, last HBA1c is 7.3 ), presented with TIA and found to have 70% stenosis of left carotid artery requiring carotid artery endarterectomy today. Admit to ICU for post op monitoring.   1. POD 0  - patient extubated, stable speaking in full sentences, able to move all extremities   -  will repeat CBC, BMP, lactate post op in am   - will restart medications   - diet to be restarted: Clears; advance as tolerated  - pain management with Tylenol fentanyl and percocet   - aspiration precautions     2. h/o CVA and TIA   - continue  aspirin, statin and lopressor as per guidelines   - PT evaluation     3. HTN   - continue home meds     4. DM   - A1c is 7.3  - continue HSS     5. Prophylaxis   - sc heparin for DVT ppx to be started in the evening. Instructed RN to place SCD in the meanwhile    - po protonix for GI ppx while in ICU

## 2017-05-15 NOTE — PROGRESS NOTE ADULT - SUBJECTIVE AND OBJECTIVE BOX
CC: Patient is a 76y old  Female who presents with a chief complaint of Brought by son for sudden onset of tongue heaviness, slurred speech since 9:00 PM (12 May 2017 08:43)    Admit Dx: Carotid stenosis  Overnight Events: no acute events  Pt seen/examined at the bedside: complaints she has pain in bladder and hasn't urinated    Vitals:  T(C): 36.6, Max: 37 (05-14 @ 19:25)  HR: 101 (80 - 102)  BP: 147/88 (112/85 - 147/88)  RR: 18 (17 - 18)  SpO2: 100% (95% - 100%)      PHYSICAL EXAM:  GENERAL: NAD, well-groomed, well-developed  HEAD:  Atraumatic, Normocephalic  EYES: EOMI, PERRLA, conjunctiva and sclera clear  CHEST/LUNG: Clear to auscultation bilaterally; No w/r/r  HEART: +systolic murmer  ABDOMEN: Soft, Nontender, Nondistended; Bowel sounds present +mild suprapubic tenderness  EXTREMITIES:  2+ Peripheral Pulses, No clubbing, cyanosis, or edema  NUERO:  AAO X3, No focal deficits    LABS:                        11.1   6.9   )-----------( 314      ( 15 May 2017 07:24 )             32.0     05-15    137  |  105  |  23<H>  ----------------------------<  115<H>  4.5   |  26  |  0.82    Ca    9.3      15 May 2017 07:24  Phos  3.6     05-15  Mg     2.3     05-15      PT/INR - ( 15 May 2017 07:24 )   PT: 10.9 sec;   INR: 1.00 ratio         PTT - ( 15 May 2017 07:24 )  PTT:43.7 sec

## 2017-05-15 NOTE — PROGRESS NOTE ADULT - PROBLEM SELECTOR PLAN 7
-pt is on lyrica q12  -pt complaining of fatigue; will cut down to once nightly as d/w attending
heparin subcut
heparin subcut

## 2017-05-15 NOTE — CONSULT NOTE ADULT - ATTENDING COMMENTS
Agree with assessment and plan as transcribed above.
Patient is a 75 y/o F, home, walks with assistance, lives with son, AAOx3,  w/ PMH of CVA ( Right frontal MCA , s/p TPA in Jan2017), small SAH ( acute in Right frontal region, Jan 2017)  Fibromyalgia,  HTN ( Losartan HCTz 100/25),  DM2 ( Metformin 750mg BID, last HBA1c is 7.3 ), presented with TIA and found to have 70% stenosis of left carotid artery requiring carotid artery endarterectomy today. Admit to ICU for post op monitoring.   1. POD 0  - patient extubated, stable speaking in full sentences, able to move all extremities   -  will repeat CBC, BMP, lactate post op   - will restart medications   - diet to be restarted: Clears; advance as tolerated  - pain management with Tylenol fentanyl and percocet   - aspiration precautions     2. h/o CVA and TIA   - continue  aspirin, statin and lopressor as per guidelines   - PT evaluation     3. HTN   - continue home meds     4. DM   - A1c is 7.3  - continue HSS     5. Prophylaxis   - sc heparin for DVT ppx to be started in the evening. Instructed RN to place SCD in the meanwhile    - po protonix for GI ppx while in ICU

## 2017-05-15 NOTE — CONSULT NOTE ADULT - SUBJECTIVE AND OBJECTIVE BOX
Patient is a 75 y/o F, home, walks with assistance, lives with son, AAOx3,  w/ PMH of CVA ( Right frontal MCA , s/p TPA in Jan2017), small SAH ( acute in Right frontal region, Jan 2017)  Fibromyalgia,  HTN ( Losartan HCTz 100/25),  DM2 ( Metformin 750mg BID, last HBA1c is 7.3 ), presented with TIA and found to have 70% stenosis of left carotid artery requiring carotid artery endarterectomy today.     PMH/PSH: Type 2 diabetes mellitus  Essential hypertension  DM (diabetes mellitus)  HTN (hypertension)  Fibromyalgia  History of appendectomy  No significant past surgical history  ,   Allergy: No Known Allergies  ,   Social history: ,   Family History: Family history of acute myocardial infarction  Family history of cerebrovascular accident (CVA) in .    passed away recently     Anesthesia reaction (prior) -  General Anesthesia s/p Extubation   ECHO/ stress test: . Jan/2017 shoes EF 55 to 60%, mild to mod AR, MR and  small atheroma in aortic arch on KEDAR.    Code status: Full Code       Meds in hospital:  fentaNYL    Injectable 25MICROGram(s) IV Push every 5 minutes PRN  heparin  Injectable 5000Unit(s) SubCutaneous every 8 hours  sodium chloride 0.9%. 1000milliLiter(s) IV Continuous <Continuous>  aspirin  chewable 81milliGRAM(s) Oral daily  atorvastatin 40milliGRAM(s) Oral at bedtime  acetaminophen   Tablet. 650milliGRAM(s) Oral every 6 hours PRN  pregabalin 50milliGRAM(s) Oral at bedtime  docusate sodium 100milliGRAM(s) Oral two times a day  oxyCODONE  5 mG/acetaminophen 325 mG 1Tablet(s) Oral every 6 hours PRN  oxyCODONE  5 mG/acetaminophen 325 mG 2Tablet(s) Oral every 6 hours PRN  insulin lispro (HumaLOG) corrective regimen sliding scale  SubCutaneous three times a day before meals  insulin lispro (HumaLOG) corrective regimen sliding scale  SubCutaneous at bedtime      VITALS:  T(F): 97.6, Max: 98.6 (05-14 @ 19:25)  HR: 115  BP: 147/88  RR: 18  SpO2: 100%  Wt(kg): --  I & Os for 24h ending 05-15 @ 07:00  =============================================  IN: 480 ml / OUT: 850 ml / NET: -370 ml    I & Os for current day (as of 05-15 @ 14:17)  =============================================  IN: 1500 ml / OUT: 50 ml / NET: 1450 ml    Height (cm): 147.3 (05-15 @ 07:08)  Weight (kg): 71 (05-15 @ 07:08)  BMI (kg/m2): 32.7 (05-15 @ 07:08)  BSA (m2): 1.64 (05-15 @ 07:08)    LABS:                        11.1   6.9   )-----------( 314      ( 15 May 2017 07:24 )             32.0     05-15    137  |  105  |  23<H>  ----------------------------<  115<H>  4.5   |  26  |  0.82    Ca    9.3      15 May 2017 07:24  Phos  3.6     05-15  Mg     2.3     05-15      PT/INR - ( 15 May 2017 07:24 )   PT: 10.9 sec;   INR: 1.00 ratio         PTT - ( 15 May 2017 07:24 )  PTT:43.7 sec    CAPILLARY BLOOD GLUCOSE  133 (15 May 2017 07:55)  367 (14 May 2017 21:37)  235 (14 May 2017 16:31)          REVIEW OF SYSTEMS:  CONSTITUTIONAL: Patient reports diffuse body ache. No fever, weight loss, or fatigue  EYES: No eye pain, visual disturbances, or discharge  ENMT:  No difficulty hearing, tinnitus, vertigo; No sinus or throat pain  NECK: Left side neck bandage post op. Unable to move to due to pain.   BREASTS: No pain, masses, or nipple discharge  RESPIRATORY: No cough, wheezing, chills or hemoptysis; No shortness of breath  CARDIOVASCULAR: No chest pain, palpitations, dizziness, or leg swelling  GASTROINTESTINAL: No abdominal or epigastric pain. No nausea, vomiting, or hematemesis; No diarrhea or constipation. No melena or hematochezia.  GENITOURINARY: Patient has a Jj's catheter.  No dysuria, frequency, hematuria, or incontinence  NEUROLOGICAL: Alert Orientated times three; asking for her son. Able to freely move all her limbs but unable to adequately assess motor strength as patient is tired (post anesthesia). Sensations intact. No headaches or  memory loss  SKIN: No itching, burning, rashes, or lesions   LYMPH NODES: No enlarged glands  ENDOCRINE: No heat or cold intolerance; No hair loss  MUSCULOSKELETAL: No digit deformity, cyanosis, edema or loss of pulses. Unable to adequately assess motor strength   PSYCHIATRIC: Patient is very anxious   HEME/LYMPH: No easy bruising, or bleeding gums  ALLERY AND IMMUNOLOGIC: No hives or eczema    RADIOLOGY & ADDITIONAL TESTS:    1. MRI:No evidence of acute infarct; Chronic lacunar infarcts in the right corona radiata, bilateral thalami and left basal ganglia.  2. Carotid doppler: shows no evidence of stenosis, but previous showing >70% left side stenosis.      Imaging Personally Reviewed:  [+ ] YES  [ ] NO    Consultant(s) Notes Reviewed:  [+ ] YES  [ ] NO    PHYSICAL EXAM:  GENERAL: NAD, well-groomed, well-developed  HEAD:  Atraumatic, Normocephalic  EYES: EOMI, PERRLA, conjunctiva and sclera clear  ENMT: Left side neck bandage post oo hence limited ROM. No tonsillar erythema, exudates, or enlargement; Moist mucous membranes, Good dentition, No lesions  NECK: Supple, No JVD, Normal thyroid  NERVOUS SYSTEM:  Alert Orientated times three; asking for her son. Able to freely move all her limbs but unable to adequately assess motor strength as patient is tired (post anesthesia).  CHEST/LUNG: Clear to percussion bilaterally; No rales, rhonchi, wheezing, or rubs  HEART: Regular rate and rhythm; No murmurs, rubs, or gallops  ABDOMEN: Soft, Nontender, Nondistended; Bowel sounds present  EXTREMITIES:  2+ Peripheral Pulses, No clubbing, cyanosis, or edema  LYMPH: No lymphadenopathy noted  SKIN: No rashes or lesions    Care Discussed with Consultants/Other Providers [+ ] YES  [ ] NO Patient is a 75 y/o F, home, walks with assistance, lives with son, PMH of CVA ( Right frontal MCA , s/p TPA in Jan2017), small SAH ( acute in Right frontal region, Jan 2017)  Fibromyalgia,  HTN and  DM2  presented with TIA (transient slurred speech and tongue heaviness) and found to have 70% stenosis of left carotid artery requiring carotid artery endarterectomy today.     PMH/PSH: Type 2 diabetes mellitus  Essential hypertension  DM (diabetes mellitus)  HTN (hypertension)  Fibromyalgia  History of appendectomy  No significant past surgical history  ,   Allergy: No Known Allergies  ,   Social history: ,   Family History: Family history of acute myocardial infarction  Family history of cerebrovascular accident (CVA) in .    passed away recently     Anesthesia reaction (prior) -  General Anesthesia s/p Extubation   ECHO/ stress test: . Jan/2017 shoes EF 55 to 60%, mild to mod AR, MR and  small atheroma in aortic arch on KEDAR.    Code status: Full Code       Meds in hospital:  fentaNYL    Injectable 25MICROGram(s) IV Push every 5 minutes PRN  heparin  Injectable 5000Unit(s) SubCutaneous every 8 hours  sodium chloride 0.9%. 1000milliLiter(s) IV Continuous <Continuous>  aspirin  chewable 81milliGRAM(s) Oral daily  atorvastatin 40milliGRAM(s) Oral at bedtime  acetaminophen   Tablet. 650milliGRAM(s) Oral every 6 hours PRN  pregabalin 50milliGRAM(s) Oral at bedtime  docusate sodium 100milliGRAM(s) Oral two times a day  oxyCODONE  5 mG/acetaminophen 325 mG 1Tablet(s) Oral every 6 hours PRN  oxyCODONE  5 mG/acetaminophen 325 mG 2Tablet(s) Oral every 6 hours PRN  insulin lispro (HumaLOG) corrective regimen sliding scale  SubCutaneous three times a day before meals  insulin lispro (HumaLOG) corrective regimen sliding scale  SubCutaneous at bedtime      VITALS:  T(F): 97.6, Max: 98.6 (05-14 @ 19:25)  HR: 115  BP: 147/88  RR: 18  SpO2: 100%  Wt(kg): --  I & Os for 24h ending 05-15 @ 07:00  =============================================  IN: 480 ml / OUT: 850 ml / NET: -370 ml    I & Os for current day (as of 05-15 @ 14:17)  =============================================  IN: 1500 ml / OUT: 50 ml / NET: 1450 ml    Height (cm): 147.3 (05-15 @ 07:08)  Weight (kg): 71 (05-15 @ 07:08)  BMI (kg/m2): 32.7 (05-15 @ 07:08)  BSA (m2): 1.64 (05-15 @ 07:08)    LABS:                        11.1   6.9   )-----------( 314      ( 15 May 2017 07:24 )             32.0     05-15    137  |  105  |  23<H>  ----------------------------<  115<H>  4.5   |  26  |  0.82    Ca    9.3      15 May 2017 07:24  Phos  3.6     05-15  Mg     2.3     05-15      PT/INR - ( 15 May 2017 07:24 )   PT: 10.9 sec;   INR: 1.00 ratio         PTT - ( 15 May 2017 07:24 )  PTT:43.7 sec    CAPILLARY BLOOD GLUCOSE  133 (15 May 2017 07:55)  367 (14 May 2017 21:37)  235 (14 May 2017 16:31)          REVIEW OF SYSTEMS:  CONSTITUTIONAL: Patient reports diffuse body ache. No fever, weight loss, or fatigue  EYES: No eye pain, visual disturbances, or discharge  ENMT:  No difficulty hearing, tinnitus, vertigo; No sinus or throat pain  NECK: Left side neck bandage post op. Unable to move to due to pain.   BREASTS: No pain, masses, or nipple discharge  RESPIRATORY: No cough, wheezing, chills or hemoptysis; No shortness of breath  CARDIOVASCULAR: No chest pain, palpitations, dizziness, or leg swelling  GASTROINTESTINAL: No abdominal or epigastric pain. No nausea, vomiting, or hematemesis; No diarrhea or constipation. No melena or hematochezia.  GENITOURINARY: Patient has a Jj's catheter.  No dysuria, frequency, hematuria, or incontinence  NEUROLOGICAL: Alert Orientated times three; asking for her son. Able to freely move all her limbs but unable to adequately assess motor strength as patient is tired (post anesthesia). Sensations intact. No headaches or  memory loss  SKIN: No itching, burning, rashes, or lesions   LYMPH NODES: No enlarged glands  ENDOCRINE: No heat or cold intolerance; No hair loss  MUSCULOSKELETAL: No digit deformity, cyanosis, edema or loss of pulses. Unable to adequately assess motor strength   PSYCHIATRIC: Patient is very anxious   HEME/LYMPH: No easy bruising, or bleeding gums  ALLERY AND IMMUNOLOGIC: No hives or eczema    RADIOLOGY & ADDITIONAL TESTS:    1. MRI:No evidence of acute infarct; Chronic lacunar infarcts in the right corona radiata, bilateral thalami and left basal ganglia.  2. Carotid doppler: shows no evidence of stenosis, but previous showing >70% left side stenosis.      Imaging Personally Reviewed:  [+ ] YES  [ ] NO    Consultant(s) Notes Reviewed:  [+ ] YES  [ ] NO    PHYSICAL EXAM:  GENERAL: NAD, well-groomed, well-developed  HEAD:  Atraumatic, Normocephalic  EYES: EOMI, PERRLA, conjunctiva and sclera clear  ENMT: Left side neck bandage post oo hence limited ROM. No tonsillar erythema, exudates, or enlargement; Moist mucous membranes, Good dentition, No lesions  NECK: Supple, No JVD, Normal thyroid  NERVOUS SYSTEM:  Alert Orientated times three; asking for her son. Able to freely move all her limbs but unable to adequately assess motor strength as patient is tired (post anesthesia).  CHEST/LUNG: Clear to percussion bilaterally; No rales, rhonchi, wheezing, or rubs  HEART: Regular rate and rhythm; No murmurs, rubs, or gallops  ABDOMEN: Soft, Nontender, Nondistended; Bowel sounds present  EXTREMITIES:  2+ Peripheral Pulses, No clubbing, cyanosis, or edema  LYMPH: No lymphadenopathy noted  SKIN: No rashes or lesions    Care Discussed with Consultants/Other Providers [+ ] YES  [ ] NO Patient is a 75 y/o F, home, walks with assistance, lives with son, PMH of CVA ( Right frontal MCA , s/p TPA in Jan2017), small SAH ( acute in Right frontal region, Jan 2017)  Fibromyalgia,  HTN and  DM2  presented with TIA (transient slurred speech and tongue heaviness) and found to have 70% stenosis of left carotid artery requiring carotid artery endarterectomy today.     PMH/PSH: Type 2 diabetes mellitus  Essential hypertension  DM (diabetes mellitus)  HTN (hypertension)  Fibromyalgia  History of appendectomy  No significant past surgical history  ,   Allergy: No Known Allergies  ,   Social history: ,   Family History: Family history of acute myocardial infarction  Family history of cerebrovascular accident (CVA) in .    passed away recently     Anesthesia reaction (prior) -  General Anesthesia s/p Extubation   ECHO/ stress test: . Jan/2017 shoes EF 55 to 60%, mild to mod AR, MR and  small atheroma in aortic arch on KEDAR.    Code status: Full Code       Meds in hospital:  fentaNYL    Injectable 25MICROGram(s) IV Push every 5 minutes PRN  heparin  Injectable 5000Unit(s) SubCutaneous every 8 hours  sodium chloride 0.9%. 1000milliLiter(s) IV Continuous <Continuous>  aspirin  chewable 81milliGRAM(s) Oral daily  atorvastatin 40milliGRAM(s) Oral at bedtime  acetaminophen   Tablet. 650milliGRAM(s) Oral every 6 hours PRN  pregabalin 50milliGRAM(s) Oral at bedtime  docusate sodium 100milliGRAM(s) Oral two times a day  oxyCODONE  5 mG/acetaminophen 325 mG 1Tablet(s) Oral every 6 hours PRN  oxyCODONE  5 mG/acetaminophen 325 mG 2Tablet(s) Oral every 6 hours PRN  insulin lispro (HumaLOG) corrective regimen sliding scale  SubCutaneous three times a day before meals  insulin lispro (HumaLOG) corrective regimen sliding scale  SubCutaneous at bedtime      VITALS:  T(F): 97.6, Max: 98.6 (05-14 @ 19:25)  HR: 115  BP: 147/88  RR: 18  SpO2: 100%  Wt(kg): --  I & Os for 24h ending 05-15 @ 07:00  =============================================  IN: 480 ml / OUT: 850 ml / NET: -370 ml    I & Os for current day (as of 05-15 @ 14:17)  =============================================  IN: 1500 ml / OUT: 50 ml / NET: 1450 ml    Height (cm): 147.3 (05-15 @ 07:08)  Weight (kg): 71 (05-15 @ 07:08)  BMI (kg/m2): 32.7 (05-15 @ 07:08)  BSA (m2): 1.64 (05-15 @ 07:08)    LABS:                        11.1   6.9   )-----------( 314      ( 15 May 2017 07:24 )             32.0     05-15    137  |  105  |  23<H>  ----------------------------<  115<H>  4.5   |  26  |  0.82    Ca    9.3      15 May 2017 07:24  Phos  3.6     05-15  Mg     2.3     05-15      PT/INR - ( 15 May 2017 07:24 )   PT: 10.9 sec;   INR: 1.00 ratio         PTT - ( 15 May 2017 07:24 )  PTT:43.7 sec    CAPILLARY BLOOD GLUCOSE  133 (15 May 2017 07:55)  367 (14 May 2017 21:37)  235 (14 May 2017 16:31)          REVIEW OF SYSTEMS:  CONSTITUTIONAL: Patient reports diffuse body ache. No fever, weight loss, or fatigue  EYES: No eye pain, visual disturbances, or discharge  ENMT:  No difficulty hearing, tinnitus, vertigo; No sinus or throat pain  NECK: Left side neck bandage post op. Unable to move to due to pain.   BREASTS: No pain, masses, or nipple discharge  RESPIRATORY: No cough, wheezing, chills or hemoptysis; No shortness of breath  CARDIOVASCULAR: No chest pain, palpitations, dizziness, or leg swelling  GASTROINTESTINAL: No abdominal or epigastric pain. No nausea, vomiting, or hematemesis; No diarrhea or constipation. No melena or hematochezia.  GENITOURINARY: Patient has a Jj's catheter.  No dysuria, frequency, hematuria, or incontinence  NEUROLOGICAL: Alert Orientated times three; asking for her son. Able to freely move all her limbs but unable to adequately assess motor strength as patient is tired (post anesthesia). Sensations intact. No headaches or  memory loss  SKIN: No itching, burning, rashes, or lesions   LYMPH NODES: No enlarged glands  ENDOCRINE: No heat or cold intolerance; No hair loss  MUSCULOSKELETAL: No digit deformity, cyanosis, edema or loss of pulses. Unable to adequately assess motor strength   PSYCHIATRIC: Patient is very anxious   HEME/LYMPH: No easy bruising, or bleeding gums  ALLERY AND IMMUNOLOGIC: No hives or eczema    RADIOLOGY & ADDITIONAL TESTS:    1. MRI:No evidence of acute infarct; Chronic lacunar infarcts in the right corona radiata, bilateral thalami and left basal ganglia.  2. Carotid doppler: shows no evidence of stenosis  3. Jan/2017; CTA neck shows 70% stenosis of left carotid artery       Imaging Personally Reviewed:  [+ ] YES  [ ] NO    Consultant(s) Notes Reviewed:  [+ ] YES  [ ] NO    PHYSICAL EXAM:  GENERAL: NAD, well-groomed, well-developed  HEAD:  Atraumatic, Normocephalic  EYES: EOMI, PERRLA, conjunctiva and sclera clear  ENMT: Left side neck bandage post oo hence limited ROM. No tonsillar erythema, exudates, or enlargement; Moist mucous membranes, Good dentition, No lesions  NECK: Supple, No JVD, Normal thyroid  NERVOUS SYSTEM:  Alert Orientated times three; asking for her son. Able to freely move all her limbs but unable to adequately assess motor strength as patient is tired (post anesthesia).  CHEST/LUNG: Clear to percussion bilaterally; No rales, rhonchi, wheezing, or rubs  HEART: Regular rate and rhythm; No murmurs, rubs, or gallops  ABDOMEN: Soft, Nontender, Nondistended; Bowel sounds present  EXTREMITIES:  2+ Peripheral Pulses, No clubbing, cyanosis, or edema  LYMPH: No lymphadenopathy noted  SKIN: No rashes or lesions    Care Discussed with Consultants/Other Providers [+ ] YES  [ ] NO

## 2017-05-15 NOTE — PROGRESS NOTE ADULT - SUBJECTIVE AND OBJECTIVE BOX
POST-OP NOTE    76y Female with no complaints. says she feels cold, and has some back of the head/back of the neck pain    Vital Signs:  T(C): 37.1, Max: 37.1 (05-15 @ 15:08)  HR: 103 (88 - 119)  BP: 139/69 (112/85 - 184/63)  RR: 14 (12 - 19)  SpO2: 100% (98% - 100%)  Wt(kg): --    Physical Exam:  General: NAD  HEENT: EOMI, PERRLA, no tongue deviation  Neck: dressing lightly saturated. no hematoma/seroma. HANANE drain in place    Ins/Outs:    Indwelling Catheter - Urethral: 225 ml    Estimated Blood Loss: 50 ml    Bulb: 10 ml

## 2017-05-15 NOTE — PROGRESS NOTE ADULT - PROBLEM SELECTOR PLAN 1
1- npo overnight  2- krueger catheter   3- HANANE drain in palce, record output  4- monitor pt for headaches and neuro deficits  5- keep blood pressure within certain parameters  6- post op stable

## 2017-05-16 LAB
ANION GAP SERPL CALC-SCNC: 7 MMOL/L — SIGNIFICANT CHANGE UP (ref 5–17)
BASOPHILS # BLD AUTO: 0.1 K/UL — SIGNIFICANT CHANGE UP (ref 0–0.2)
BASOPHILS NFR BLD AUTO: 0.9 % — SIGNIFICANT CHANGE UP (ref 0–2)
BUN SERPL-MCNC: 11 MG/DL — SIGNIFICANT CHANGE UP (ref 7–18)
CALCIUM SERPL-MCNC: 7.7 MG/DL — LOW (ref 8.4–10.5)
CHLORIDE SERPL-SCNC: 109 MMOL/L — HIGH (ref 96–108)
CO2 SERPL-SCNC: 24 MMOL/L — SIGNIFICANT CHANGE UP (ref 22–31)
CREAT SERPL-MCNC: 0.63 MG/DL — SIGNIFICANT CHANGE UP (ref 0.5–1.3)
EOSINOPHIL # BLD AUTO: 0.3 K/UL — SIGNIFICANT CHANGE UP (ref 0–0.5)
EOSINOPHIL NFR BLD AUTO: 3.2 % — SIGNIFICANT CHANGE UP (ref 0–6)
GLUCOSE SERPL-MCNC: 120 MG/DL — HIGH (ref 70–99)
HCT VFR BLD CALC: 28 % — LOW (ref 34.5–45)
HGB BLD-MCNC: 9.6 G/DL — LOW (ref 11.5–15.5)
LYMPHOCYTES # BLD AUTO: 1.6 K/UL — SIGNIFICANT CHANGE UP (ref 1–3.3)
LYMPHOCYTES # BLD AUTO: 18.3 % — SIGNIFICANT CHANGE UP (ref 13–44)
MAGNESIUM SERPL-MCNC: 1.9 MG/DL — SIGNIFICANT CHANGE UP (ref 1.6–2.6)
MCHC RBC-ENTMCNC: 28.8 PG — SIGNIFICANT CHANGE UP (ref 27–34)
MCHC RBC-ENTMCNC: 34.4 GM/DL — SIGNIFICANT CHANGE UP (ref 32–36)
MCV RBC AUTO: 83.6 FL — SIGNIFICANT CHANGE UP (ref 80–100)
MONOCYTES # BLD AUTO: 0.8 K/UL — SIGNIFICANT CHANGE UP (ref 0–0.9)
MONOCYTES NFR BLD AUTO: 9.8 % — SIGNIFICANT CHANGE UP (ref 2–14)
NEUTROPHILS # BLD AUTO: 5.8 K/UL — SIGNIFICANT CHANGE UP (ref 1.8–7.4)
NEUTROPHILS NFR BLD AUTO: 67.9 % — SIGNIFICANT CHANGE UP (ref 43–77)
PHOSPHATE SERPL-MCNC: 2.9 MG/DL — SIGNIFICANT CHANGE UP (ref 2.5–4.5)
PLATELET # BLD AUTO: 278 K/UL — SIGNIFICANT CHANGE UP (ref 150–400)
POTASSIUM SERPL-MCNC: 4.3 MMOL/L — SIGNIFICANT CHANGE UP (ref 3.5–5.3)
POTASSIUM SERPL-SCNC: 4.3 MMOL/L — SIGNIFICANT CHANGE UP (ref 3.5–5.3)
RBC # BLD: 3.34 M/UL — LOW (ref 3.8–5.2)
RBC # FLD: 13.5 % — SIGNIFICANT CHANGE UP (ref 10.3–14.5)
SODIUM SERPL-SCNC: 140 MMOL/L — SIGNIFICANT CHANGE UP (ref 135–145)
WBC # BLD: 8.5 K/UL — SIGNIFICANT CHANGE UP (ref 3.8–10.5)
WBC # FLD AUTO: 8.5 K/UL — SIGNIFICANT CHANGE UP (ref 3.8–10.5)

## 2017-05-16 PROCEDURE — 71010: CPT | Mod: 26

## 2017-05-16 RX ORDER — FUROSEMIDE 40 MG
40 TABLET ORAL DAILY
Qty: 0 | Refills: 0 | Status: DISCONTINUED | OUTPATIENT
Start: 2017-05-16 | End: 2017-05-18

## 2017-05-16 RX ORDER — IPRATROPIUM/ALBUTEROL SULFATE 18-103MCG
3 AEROSOL WITH ADAPTER (GRAM) INHALATION ONCE
Qty: 0 | Refills: 0 | Status: COMPLETED | OUTPATIENT
Start: 2017-05-16 | End: 2017-05-16

## 2017-05-16 RX ORDER — MORPHINE SULFATE 50 MG/1
2 CAPSULE, EXTENDED RELEASE ORAL EVERY 4 HOURS
Qty: 0 | Refills: 0 | Status: DISCONTINUED | OUTPATIENT
Start: 2017-05-16 | End: 2017-05-18

## 2017-05-16 RX ORDER — POLYETHYLENE GLYCOL 3350 17 G/17G
17 POWDER, FOR SOLUTION ORAL DAILY
Qty: 0 | Refills: 0 | Status: DISCONTINUED | OUTPATIENT
Start: 2017-05-16 | End: 2017-05-18

## 2017-05-16 RX ADMIN — SIMVASTATIN 20 MILLIGRAM(S): 20 TABLET, FILM COATED ORAL at 21:55

## 2017-05-16 RX ADMIN — Medication 81 MILLIGRAM(S): at 11:21

## 2017-05-16 RX ADMIN — Medication 100 MILLIGRAM(S): at 06:00

## 2017-05-16 RX ADMIN — HEPARIN SODIUM 5000 UNIT(S): 5000 INJECTION INTRAVENOUS; SUBCUTANEOUS at 06:00

## 2017-05-16 RX ADMIN — Medication 40 MILLIGRAM(S): at 20:29

## 2017-05-16 RX ADMIN — Medication 3 MILLILITER(S): at 20:03

## 2017-05-16 RX ADMIN — POLYETHYLENE GLYCOL 3350 17 GRAM(S): 17 POWDER, FOR SOLUTION ORAL at 21:56

## 2017-05-16 RX ADMIN — HEPARIN SODIUM 5000 UNIT(S): 5000 INJECTION INTRAVENOUS; SUBCUTANEOUS at 21:55

## 2017-05-16 RX ADMIN — Medication 100 MILLIGRAM(S): at 17:23

## 2017-05-16 RX ADMIN — HEPARIN SODIUM 5000 UNIT(S): 5000 INJECTION INTRAVENOUS; SUBCUTANEOUS at 14:15

## 2017-05-16 RX ADMIN — SODIUM CHLORIDE 75 MILLILITER(S): 9 INJECTION INTRAMUSCULAR; INTRAVENOUS; SUBCUTANEOUS at 17:23

## 2017-05-16 RX ADMIN — MORPHINE SULFATE 2 MILLIGRAM(S): 50 CAPSULE, EXTENDED RELEASE ORAL at 21:05

## 2017-05-16 RX ADMIN — MORPHINE SULFATE 2 MILLIGRAM(S): 50 CAPSULE, EXTENDED RELEASE ORAL at 20:35

## 2017-05-16 RX ADMIN — Medication 50 MILLIGRAM(S): at 21:55

## 2017-05-16 RX ADMIN — PANTOPRAZOLE SODIUM 40 MILLIGRAM(S): 20 TABLET, DELAYED RELEASE ORAL at 06:08

## 2017-05-16 NOTE — PROGRESS NOTE ADULT - SUBJECTIVE AND OBJECTIVE BOX
INTERVAL HPI/OVERNIGHT EVENTS: No overnight events     PRESSORS: [ ] YES [+ ] NO  WHICH:        Antimicrobial: None     Cardiovascular:    Pulmonary:    Hematalogic:  heparin  Injectable 5000Unit(s) SubCutaneous every 8 hours  aspirin  chewable 81milliGRAM(s) Oral daily    Other:  fentaNYL    Injectable 25MICROGram(s) IV Push every 5 minutes PRN  sodium chloride 0.9%. 1000milliLiter(s) IV Continuous <Continuous>  acetaminophen   Tablet. 650milliGRAM(s) Oral every 6 hours PRN  pregabalin 50milliGRAM(s) Oral at bedtime  docusate sodium 100milliGRAM(s) Oral two times a day  oxyCODONE  5 mG/acetaminophen 325 mG 1Tablet(s) Oral every 6 hours PRN  oxyCODONE  5 mG/acetaminophen 325 mG 2Tablet(s) Oral every 6 hours PRN  insulin lispro (HumaLOG) corrective regimen sliding scale  SubCutaneous at bedtime  pantoprazole    Tablet 40milliGRAM(s) Oral before breakfast  simvastatin 20milliGRAM(s) Oral at bedtime    fentaNYL    Injectable 25MICROGram(s) IV Push every 5 minutes PRN  heparin  Injectable 5000Unit(s) SubCutaneous every 8 hours  sodium chloride 0.9%. 1000milliLiter(s) IV Continuous <Continuous>  aspirin  chewable 81milliGRAM(s) Oral daily  acetaminophen   Tablet. 650milliGRAM(s) Oral every 6 hours PRN  pregabalin 50milliGRAM(s) Oral at bedtime  docusate sodium 100milliGRAM(s) Oral two times a day  oxyCODONE  5 mG/acetaminophen 325 mG 1Tablet(s) Oral every 6 hours PRN  oxyCODONE  5 mG/acetaminophen 325 mG 2Tablet(s) Oral every 6 hours PRN  insulin lispro (HumaLOG) corrective regimen sliding scale  SubCutaneous at bedtime  pantoprazole    Tablet 40milliGRAM(s) Oral before breakfast  simvastatin 20milliGRAM(s) Oral at bedtime    Drug Dosing Weight  Height (cm): 147.3 (15 May 2017 07:08)  Weight (kg): 71 (15 May 2017 16:00)  BMI (kg/m2): 32.7 (15 May 2017 16:00)  BSA (m2): 1.64 (15 May 2017 16:00)    CENTRAL LINE: [ ] YES [+ ] NO  LOCATION:   DATE INSERTED:  REMOVE: [ ] YES [ ] NO  EXPLAIN:    PRYOR: [+] YES [ ] NO    DATE INSERTED:  REMOVE:  [ ] YES [ ] NO  EXPLAIN:I/O    A-LINE:  [ ] YES [ ] NO  LOCATION:   DATE INSERTED:  REMOVE:  [ ] YES [ ] NO  EXPLAIN:    PMH -reviewed admission note, no change since admission+  Heart faliure: acute [ ] chronic [ ] acute or chronic [ ] diastolic [ ] systolic [ ] combied systolic and diastolic[ ]  DAMIÁN: ATN[ ] renal medullary necrosis [ ] CKD I [ ]CKDII [ ]CKD III [ ]CKD IV [ ]CKD V [ ]Other pathological lesions [ ]  Abdominal Nutrition Status: malnutrition [ ] cachexia [ ] morbid obesity/BMI=40 [ ] Supplement ordered [___________]     ICU Vital Signs Last 24 Hrs  T(C): 36.2, Max: 37.1 (05-15 @ 15:08)  T(F): 97.1, Max: 98.8 (05-15 @ 15:08)  HR: 110 (91 - 119)  BP: 115/76 (92/68 - 184/63)  BP(mean): 80 (75 - 119)  ABP: 128/63 (118/68 - 183/89)  ABP(mean): 89 (76 - 135)  RR: 19 (11 - 23)  SpO2: 100% (85% - 100%)                  PHYSICAL EXAM:    GENERAL: [ ]NAD, [ ]well-groomed, [ ]well-developed  HEAD:  [ ]Atraumatic, [ ]Normocephalic  EYES: [ ]EOMI, [ ]PERRLA, [ ]conjunctiva and sclera clear  ENMT: [ ]No tonsillar erythema, exudates, or enlargement; [ ]Moist mucous membranes, [ ]Good dentition, [ ]No lesions  NECK: [ ]Supple, normal appearance, [ ]No JVD; [ ]Normal thyroid; [ ]Trachea midline  NERVOUS SYSTEM:  [ ]Alert & Oriented X3, [ ]Good concentration; [ ]Motor Strength 5/5 B/L upper and lower extremities; [ ]DTRs 2+ intact and symmetric  CHEST/LUNG: [ ]No chest deformity; [ ]Normal percussion bilaterally; [ ]No rales, rhonchi, wheezing   HEART: [ ]Regular rate and rhythm; [ ]No murmurs, rubs, or gallops  ABDOMEN: [ ]Soft, Nontender, Nondistended; [ ]Bowel sounds present  EXTREMITIES:  [ ]2+ Peripheral Pulses, [ ]No clubbing, cyanosis, or edema  LYMPH: [ ]No lymphadenopathy noted  SKIN: [ ]No rashes or lesions; [ ]Good capillary refill      LABS:  CBC Full  -  ( 16 May 2017 07:03 )  WBC Count : 8.5 K/uL  Hemoglobin : 9.6 g/dL  Hematocrit : 28.0 %  Platelet Count - Automated : 278 K/uL  Mean Cell Volume : 83.6 fl  Mean Cell Hemoglobin : 28.8 pg  Mean Cell Hemoglobin Concentration : 34.4 gm/dL  Auto Neutrophil # : 5.8 K/uL  Auto Lymphocyte # : 1.6 K/uL  Auto Monocyte # : 0.8 K/uL  Auto Eosinophil # : 0.3 K/uL  Auto Basophil # : 0.1 K/uL  Auto Neutrophil % : 67.9 %  Auto Lymphocyte % : 18.3 %  Auto Monocyte % : 9.8 %  Auto Eosinophil % : 3.2 %  Auto Basophil % : 0.9 %    05-16    140  |  109<H>  |  11  ----------------------------<  120<H>  4.3   |  24  |  0.63    Ca    7.7<L>      16 May 2017 07:03  Phos  2.9     05-16  Mg     1.9     05-16      PT/INR - ( 15 May 2017 07:24 )   PT: 10.9 sec;   INR: 1.00 ratio         PTT - ( 15 May 2017 07:24 )  PTT:43.7 sec          CRITICAL CARE TIME SPENT: 35 minutes INTERVAL HPI/OVERNIGHT EVENTS: No overnight events   Plan discussed with Rolando Black at bedside    PRESSORS: [ ] YES [+ ] NO  WHICH:        Antimicrobial: None     Cardiovascular:    Pulmonary:    Hematalogic:  heparin  Injectable 5000Unit(s) SubCutaneous every 8 hours  aspirin  chewable 81milliGRAM(s) Oral daily    Other:  fentaNYL    Injectable 25MICROGram(s) IV Push every 5 minutes PRN  sodium chloride 0.9%. 1000milliLiter(s) IV Continuous <Continuous>  acetaminophen   Tablet. 650milliGRAM(s) Oral every 6 hours PRN  pregabalin 50milliGRAM(s) Oral at bedtime  docusate sodium 100milliGRAM(s) Oral two times a day  oxyCODONE  5 mG/acetaminophen 325 mG 1Tablet(s) Oral every 6 hours PRN  oxyCODONE  5 mG/acetaminophen 325 mG 2Tablet(s) Oral every 6 hours PRN  insulin lispro (HumaLOG) corrective regimen sliding scale  SubCutaneous at bedtime  pantoprazole    Tablet 40milliGRAM(s) Oral before breakfast  simvastatin 20milliGRAM(s) Oral at bedtime    fentaNYL    Injectable 25MICROGram(s) IV Push every 5 minutes PRN  heparin  Injectable 5000Unit(s) SubCutaneous every 8 hours  sodium chloride 0.9%. 1000milliLiter(s) IV Continuous <Continuous>  aspirin  chewable 81milliGRAM(s) Oral daily  acetaminophen   Tablet. 650milliGRAM(s) Oral every 6 hours PRN  pregabalin 50milliGRAM(s) Oral at bedtime  docusate sodium 100milliGRAM(s) Oral two times a day  oxyCODONE  5 mG/acetaminophen 325 mG 1Tablet(s) Oral every 6 hours PRN  oxyCODONE  5 mG/acetaminophen 325 mG 2Tablet(s) Oral every 6 hours PRN  insulin lispro (HumaLOG) corrective regimen sliding scale  SubCutaneous at bedtime  pantoprazole    Tablet 40milliGRAM(s) Oral before breakfast  simvastatin 20milliGRAM(s) Oral at bedtime    Drug Dosing Weight  Height (cm): 147.3 (15 May 2017 07:08)  Weight (kg): 71 (15 May 2017 16:00)  BMI (kg/m2): 32.7 (15 May 2017 16:00)  BSA (m2): 1.64 (15 May 2017 16:00)    CENTRAL LINE: [ ] YES [+ ] NO  LOCATION:   DATE INSERTED:  REMOVE: [ ] YES [ ] NO  EXPLAIN:    PRYOR: [+] YES [ ] NO    DATE INSERTED:  REMOVE:  [ ] YES [ ] NO  EXPLAIN:I/O    A-LINE:  [ ] YES [ ] NO  LOCATION:   DATE INSERTED:  REMOVE:  [ ] YES [ ] NO  EXPLAIN:    PMH -reviewed admission note, no change since admission+  Heart faliure: acute [ ] chronic [ ] acute or chronic [ ] diastolic [ ] systolic [ ] combied systolic and diastolic[ ]  DAIMÁN: ATN[ ] renal medullary necrosis [ ] CKD I [ ]CKDII [ ]CKD III [ ]CKD IV [ ]CKD V [ ]Other pathological lesions [ ]  Abdominal Nutrition Status: malnutrition [ ] cachexia [ ] morbid obesity/BMI=40 [ ] Supplement ordered [___________]     ICU Vital Signs Last 24 Hrs  T(C): 36.2, Max: 37.1 (05-15 @ 15:08)  T(F): 97.1, Max: 98.8 (05-15 @ 15:08)  HR: 110 (91 - 119)  BP: 115/76 (92/68 - 184/63)  BP(mean): 80 (75 - 119)  ABP: 128/63 (118/68 - 183/89)  ABP(mean): 89 (76 - 135)  RR: 19 (11 - 23)  SpO2: 100% (85% - 100%)                  PHYSICAL EXAM:    GENERAL: [ ]NAD, [ ]well-groomed, [ ]well-developed  HEAD:  [ ]Atraumatic, [ ]Normocephalic  EYES: [ ]EOMI, [ ]PERRLA, [ ]conjunctiva and sclera clear  ENMT: [ ]No tonsillar erythema, exudates, or enlargement; [ ]Moist mucous membranes, [ ]Good dentition, [ ]No lesions  NECK: [ ]Supple, normal appearance, [ ]No JVD; [ ]Normal thyroid; [ ]Trachea midline  NERVOUS SYSTEM:  [ ]Alert & Oriented X3, [ ]Good concentration; [ ]Motor Strength 5/5 B/L upper and lower extremities; [ ]DTRs 2+ intact and symmetric  CHEST/LUNG: [ ]No chest deformity; [ ]Normal percussion bilaterally; [ ]No rales, rhonchi, wheezing   HEART: [ ]Regular rate and rhythm; [ ]No murmurs, rubs, or gallops  ABDOMEN: [ ]Soft, Nontender, Nondistended; [ ]Bowel sounds present  EXTREMITIES:  [ ]2+ Peripheral Pulses, [ ]No clubbing, cyanosis, or edema  LYMPH: [ ]No lymphadenopathy noted  SKIN: [ ]No rashes or lesions; [ ]Good capillary refill      LABS:  CBC Full  -  ( 16 May 2017 07:03 )  WBC Count : 8.5 K/uL  Hemoglobin : 9.6 g/dL  Hematocrit : 28.0 %  Platelet Count - Automated : 278 K/uL  Mean Cell Volume : 83.6 fl  Mean Cell Hemoglobin : 28.8 pg  Mean Cell Hemoglobin Concentration : 34.4 gm/dL  Auto Neutrophil # : 5.8 K/uL  Auto Lymphocyte # : 1.6 K/uL  Auto Monocyte # : 0.8 K/uL  Auto Eosinophil # : 0.3 K/uL  Auto Basophil # : 0.1 K/uL  Auto Neutrophil % : 67.9 %  Auto Lymphocyte % : 18.3 %  Auto Monocyte % : 9.8 %  Auto Eosinophil % : 3.2 %  Auto Basophil % : 0.9 %    05-16    140  |  109<H>  |  11  ----------------------------<  120<H>  4.3   |  24  |  0.63    Ca    7.7<L>      16 May 2017 07:03  Phos  2.9     05-16  Mg     1.9     05-16      PT/INR - ( 15 May 2017 07:24 )   PT: 10.9 sec;   INR: 1.00 ratio         PTT - ( 15 May 2017 07:24 )  PTT:43.7 sec          CRITICAL CARE TIME SPENT: 35 minutes

## 2017-05-16 NOTE — PROGRESS NOTE ADULT - SUBJECTIVE AND OBJECTIVE BOX
Patient seen and examined.   Time of visit: 1430    MEDICATIONS  (STANDING):  heparin  Injectable 5000Unit(s) SubCutaneous every 8 hours  sodium chloride 0.9%. 1000milliLiter(s) IV Continuous <Continuous>  aspirin  chewable 81milliGRAM(s) Oral daily  pregabalin 50milliGRAM(s) Oral at bedtime  docusate sodium 100milliGRAM(s) Oral two times a day  insulin lispro (HumaLOG) corrective regimen sliding scale  SubCutaneous at bedtime  pantoprazole    Tablet 40milliGRAM(s) Oral before breakfast  simvastatin 20milliGRAM(s) Oral at bedtime      MEDICATIONS  (PRN):  acetaminophen   Tablet. 650milliGRAM(s) Oral every 6 hours PRN Mild Pain (1 - 3)  oxyCODONE  5 mG/acetaminophen 325 mG 2Tablet(s) Oral every 4 hours PRN Moderate Pain (4 - 6)  morphine  - Injectable 2milliGRAM(s) IV Push every 4 hours PRN Severe Pain (7 - 10)   Medications up to date at time of exam.      PHYSICAL EXAMINATION:  Patient has no new complaints.  GENERAL: The patient is a well-developed, well-nourished, in no apparent distress.     Vital Signs Last 24 Hrs  T(C): 36.9, Max: 37.1 (05-16 @ 15:34)  T(F): 98.4, Max: 98.8 (05-16 @ 15:34)  HR: 103 (84 - 119)  BP: 123/72 (87/47 - 150/79)  BP(mean): 77 (58 - 119)  RR: 17 (9 - 23)  SpO2: 98% (85% - 100%)   (if applicable)      HEENT: Head is normocephalic and atraumatic. Extraocular muscles are intact. Mucous membranes are moist.     NECK: Supple, no palpable adenopathy.    LUNGS: Clear to auscultation, no wheezing, rales, or rhonchi.    HEART: Regular rate and rhythm without murmur.    ABDOMEN: Soft, nontender, and nondistended.  No hepatosplenomegaly is noted.    EXTREMITIES: Without any cyanosis, clubbing, rash, lesions or edema.    NEUROLOGIC: Awake, alert, oriented.     SKIN: Warm, dry, good turgor.      LABS:                        9.6    8.5   )-----------( 278      ( 16 May 2017 07:03 )             28.0     05-16    140  |  109<H>  |  11  ----------------------------<  120<H>  4.3   |  24  |  0.63    Ca    7.7<L>      16 May 2017 07:03  Phos  2.9     05-16  Mg     1.9     05-16      PT/INR - ( 15 May 2017 07:24 )   PT: 10.9 sec;   INR: 1.00 ratio         PTT - ( 15 May 2017 07:24 )  PTT:43.7 sec      MICROBIOLOGY: (if applicable)    RADIOLOGY & ADDITIONAL STUDIES:  EKG:   CXR:  ECHO:    IMPRESSION: 76y Female PAST MEDICAL & SURGICAL HISTORY:  Type 2 diabetes mellitus  Essential hypertension  DM (diabetes mellitus)  HTN (hypertension)  Fibromyalgia  History of appendectomy  No significant past surgical history    p/w slurred speech, mumbling, tongue heaviness due to TIA, patient with known L carotid artery stenosis > 70%, patient s/p L CEA POD#1    RECOMMENDATIONS:     - BP controlled   - B/L LE Arterial disease on arterial duplex, mgmt as per vascular   - no acute events on tele   - plan of care discussed with son.

## 2017-05-16 NOTE — CHART NOTE - NSCHARTNOTEFT_GEN_A_CORE
ICU DOWNGRADE NOTE     Patient is a 75 y/o F, home, walks with assistance, lives with son, AAOx3,  w/ PMH of CVA (Right frontal MCA , s/p TPA in Jan2017), small SAH (acute in Right frontal region, Jan 2017)  Fibromyalgia,  HTN (Losartan HCTz 100/25),  DM2 ( Metformin 750mg BID, last HBA1c is 7.3 ), presented with TIA and found to have 70% stenosis of left carotid artery requiring carotid artery endarterectomy today. Admit to ICU for post op monitoring. POD 1     1. CNS: alert oriented.   - POD 1 for CEA. Will continue pain management   - Has h/o CVA; will continue aspirin and statin as per guidelines     2. CVS: tachycardia secondary to pain. BP controlled     3. Resp: occasional cough but saturating well on room air   - aspiration precautions     4. Endo   - A1c is 7.3  - continue HSS and hold metformin     5. Prophylaxis   - sc heparin for DVT ppx   - po protonix for GI ppx     6. ID issues  - none    7. Skin   - surgical site clean

## 2017-05-16 NOTE — PROGRESS NOTE ADULT - SUBJECTIVE AND OBJECTIVE BOX
76y Female with no overnight complaints. Tolerating reg diet. no complaints of headache or any neuro deficits noted    Vital Signs:  T(C): 36.2, Max: 37.1 (05-15 @ 15:08)  HR: 101 (84 - 119)  BP: 102/44 (87/47 - 184/63)  RR: 12 (9 - 23)  SpO2: 100% (85% - 100%)  Wt(kg): --    Physical Exam:  General: Alert, Awake, NAD  Neck: Dressing C/D/I, no hematoma/seroma noted. HANANE drain in place  HEENT: EMOI, PERRLA, tongue has no deviation. no facial droop, no head/neck pain    Ins/Outs:    Indwelling Catheter - Urethral: 1455 ml    Estimated Blood Loss: 50 ml    Bulb: 30 ml

## 2017-05-16 NOTE — PROGRESS NOTE ADULT - ASSESSMENT
Patient is a 77 y/o F, home, walks with assistance, lives with son, AAOx3,  w/ PMH of CVA (Right frontal MCA , s/p TPA in Jan2017), small SAH (acute in Right frontal region, Jan 2017)  Fibromyalgia,  HTN (Losartan HCTz 100/25),  DM2 ( Metformin 750mg BID, last HBA1c is 7.3 ), presented with TIA and found to have 70% stenosis of left carotid artery requiring carotid artery endarterectomy today. Admit to ICU for post op monitoring. POD 1     1. CNS: alert oriented.   - POD 1 for CEA. Will continue pain management   - Has h/o CVA; will continue aspirin, statin and lopressor as per guidelines     2. CVS: BP controlled     3. Resp: saturating well on room air   - aspiration precautions     4. Endo   - A1c is 7.3  - continue HSS and hold metformin     5. Prophylaxis   - sc heparin for DVT ppx to be started in the evening.  - po protonix for GI ppx while in ICU     6. Renal   - Jj's catheter for I/O     7. ID issues  - none    8. Skin   - surgical site clean Patient is a 77 y/o F, home, walks with assistance, lives with son, AAOx3,  w/ PMH of CVA (Right frontal MCA , s/p TPA in Jan2017), small SAH (acute in Right frontal region, Jan 2017)  Fibromyalgia,  HTN (Losartan HCTz 100/25),  DM2 ( Metformin 750mg BID, last HBA1c is 7.3 ), presented with TIA and found to have 70% stenosis of left carotid artery requiring carotid artery endarterectomy today. Admit to ICU for post op monitoring. POD 1     1. CNS: alert oriented.   - POD 1 for CEA. Will continue pain management   - Has h/o CVA; will continue aspirin and statin as per guidelines     2. CVS: BP controlled     3. Resp: saturating well on room air   - aspiration precautions     4. Endo   - A1c is 7.3  - continue HSS and hold metformin     5. Prophylaxis   - sc heparin for DVT ppx to be started in the evening.  - po protonix for GI ppx while in ICU     6. Renal   - Jj's catheter for I/O     7. ID issues  - none    8. Skin   - surgical site clean

## 2017-05-16 NOTE — CHART NOTE - NSCHARTNOTEFT_GEN_A_CORE
Called by surgical house officer to evaluate the patient for SOB. On arrival patient was in moderate respiratory distress on 4 Lt if O2 and getting active treatment of bronchodilators. She complained of difficulty breathing, pain of surgical site, urinary retention and constipation. She denied chest pain, palpitations, dizziness, focal weakness, nausea, vomiting, abdominal pain.   On PE: alert oriented x3, follows all commands, no focal weakness.  Neck: rt. side in dressing, no JVD.  Cardio: S1, S2, tachycardiac, no murmur  Pulmonary: b/l rhonchi and crackles at bases, no wheezing  GI/: abdomen distended, soft, non tender, BS (+)   Extr: no edema, no rash    A/P: 75 yo female with PMHs of CVA, SAH, HTN, DM s/p rt. CEA POD 1 c/o SOB    1. SOB likely secondary to fluid overload  D/c IV fluids  CXR done: b/l congestion (pending official report)   Change NC to venti masc 50 %  Insert Jj - patient complaining of urinary retention (likely opiate induced)   Give 1 dose of Furosemide 40 mg IV for now, continue if no Improvement    Monitor urine output  Pain control; patient on Morphine PRN     2. Constipation: started Miralax and c/w Docusate

## 2017-05-17 LAB
ANION GAP SERPL CALC-SCNC: 11 MMOL/L — SIGNIFICANT CHANGE UP (ref 5–17)
BASOPHILS # BLD AUTO: 0.1 K/UL — SIGNIFICANT CHANGE UP (ref 0–0.2)
BASOPHILS NFR BLD AUTO: 0.4 % — SIGNIFICANT CHANGE UP (ref 0–2)
BUN SERPL-MCNC: 10 MG/DL — SIGNIFICANT CHANGE UP (ref 7–18)
CALCIUM SERPL-MCNC: 8.7 MG/DL — SIGNIFICANT CHANGE UP (ref 8.4–10.5)
CHLORIDE SERPL-SCNC: 101 MMOL/L — SIGNIFICANT CHANGE UP (ref 96–108)
CO2 SERPL-SCNC: 27 MMOL/L — SIGNIFICANT CHANGE UP (ref 22–31)
CREAT SERPL-MCNC: 0.82 MG/DL — SIGNIFICANT CHANGE UP (ref 0.5–1.3)
EOSINOPHIL # BLD AUTO: 0 K/UL — SIGNIFICANT CHANGE UP (ref 0–0.5)
EOSINOPHIL NFR BLD AUTO: 0.2 % — SIGNIFICANT CHANGE UP (ref 0–6)
GLUCOSE SERPL-MCNC: 164 MG/DL — HIGH (ref 70–99)
HCT VFR BLD CALC: 31.8 % — LOW (ref 34.5–45)
HGB BLD-MCNC: 10.8 G/DL — LOW (ref 11.5–15.5)
LYMPHOCYTES # BLD AUTO: 1.1 K/UL — SIGNIFICANT CHANGE UP (ref 1–3.3)
LYMPHOCYTES # BLD AUTO: 7.7 % — LOW (ref 13–44)
MAGNESIUM SERPL-MCNC: 1.9 MG/DL — SIGNIFICANT CHANGE UP (ref 1.6–2.6)
MCHC RBC-ENTMCNC: 28.9 PG — SIGNIFICANT CHANGE UP (ref 27–34)
MCHC RBC-ENTMCNC: 33.9 GM/DL — SIGNIFICANT CHANGE UP (ref 32–36)
MCV RBC AUTO: 85.1 FL — SIGNIFICANT CHANGE UP (ref 80–100)
MONOCYTES # BLD AUTO: 0.6 K/UL — SIGNIFICANT CHANGE UP (ref 0–0.9)
MONOCYTES NFR BLD AUTO: 4.3 % — SIGNIFICANT CHANGE UP (ref 2–14)
NEUTROPHILS # BLD AUTO: 12.4 K/UL — HIGH (ref 1.8–7.4)
NEUTROPHILS NFR BLD AUTO: 87.4 % — HIGH (ref 43–77)
PHOSPHATE SERPL-MCNC: 3.2 MG/DL — SIGNIFICANT CHANGE UP (ref 2.5–4.5)
PLATELET # BLD AUTO: 301 K/UL — SIGNIFICANT CHANGE UP (ref 150–400)
POTASSIUM SERPL-MCNC: 3.8 MMOL/L — SIGNIFICANT CHANGE UP (ref 3.5–5.3)
POTASSIUM SERPL-SCNC: 3.8 MMOL/L — SIGNIFICANT CHANGE UP (ref 3.5–5.3)
RBC # BLD: 3.73 M/UL — LOW (ref 3.8–5.2)
RBC # FLD: 13.7 % — SIGNIFICANT CHANGE UP (ref 10.3–14.5)
SODIUM SERPL-SCNC: 139 MMOL/L — SIGNIFICANT CHANGE UP (ref 135–145)
SURGICAL PATHOLOGY FINAL REPORT - CH: SIGNIFICANT CHANGE UP
WBC # BLD: 14.2 K/UL — HIGH (ref 3.8–10.5)
WBC # FLD AUTO: 14.2 K/UL — HIGH (ref 3.8–10.5)

## 2017-05-17 PROCEDURE — 71275 CT ANGIOGRAPHY CHEST: CPT | Mod: 26

## 2017-05-17 RX ORDER — FUROSEMIDE 40 MG
20 TABLET ORAL ONCE
Qty: 0 | Refills: 0 | Status: DISCONTINUED | OUTPATIENT
Start: 2017-05-17 | End: 2017-05-17

## 2017-05-17 RX ORDER — METOPROLOL TARTRATE 50 MG
25 TABLET ORAL
Qty: 0 | Refills: 0 | Status: DISCONTINUED | OUTPATIENT
Start: 2017-05-17 | End: 2017-05-18

## 2017-05-17 RX ORDER — TAMSULOSIN HYDROCHLORIDE 0.4 MG/1
0.4 CAPSULE ORAL AT BEDTIME
Qty: 0 | Refills: 0 | Status: DISCONTINUED | OUTPATIENT
Start: 2017-05-17 | End: 2017-05-24

## 2017-05-17 RX ADMIN — Medication 81 MILLIGRAM(S): at 14:57

## 2017-05-17 RX ADMIN — TAMSULOSIN HYDROCHLORIDE 0.4 MILLIGRAM(S): 0.4 CAPSULE ORAL at 22:35

## 2017-05-17 RX ADMIN — POLYETHYLENE GLYCOL 3350 17 GRAM(S): 17 POWDER, FOR SOLUTION ORAL at 14:56

## 2017-05-17 RX ADMIN — HEPARIN SODIUM 5000 UNIT(S): 5000 INJECTION INTRAVENOUS; SUBCUTANEOUS at 14:57

## 2017-05-17 RX ADMIN — Medication 25 MILLIGRAM(S): at 18:29

## 2017-05-17 RX ADMIN — Medication 100 MILLIGRAM(S): at 17:30

## 2017-05-17 RX ADMIN — HEPARIN SODIUM 5000 UNIT(S): 5000 INJECTION INTRAVENOUS; SUBCUTANEOUS at 22:34

## 2017-05-17 RX ADMIN — MORPHINE SULFATE 2 MILLIGRAM(S): 50 CAPSULE, EXTENDED RELEASE ORAL at 01:56

## 2017-05-17 RX ADMIN — Medication 40 MILLIGRAM(S): at 07:53

## 2017-05-17 RX ADMIN — SIMVASTATIN 20 MILLIGRAM(S): 20 TABLET, FILM COATED ORAL at 22:35

## 2017-05-17 RX ADMIN — HEPARIN SODIUM 5000 UNIT(S): 5000 INJECTION INTRAVENOUS; SUBCUTANEOUS at 06:09

## 2017-05-17 RX ADMIN — PANTOPRAZOLE SODIUM 40 MILLIGRAM(S): 20 TABLET, DELAYED RELEASE ORAL at 06:09

## 2017-05-17 RX ADMIN — Medication 10 MILLIGRAM(S): at 17:29

## 2017-05-17 RX ADMIN — Medication 50 MILLIGRAM(S): at 22:38

## 2017-05-17 RX ADMIN — Medication 100 MILLIGRAM(S): at 06:09

## 2017-05-17 RX ADMIN — MORPHINE SULFATE 2 MILLIGRAM(S): 50 CAPSULE, EXTENDED RELEASE ORAL at 01:26

## 2017-05-17 NOTE — CONSULT NOTE ADULT - SUBJECTIVE AND OBJECTIVE BOX
Time of visit:    CHIEF COMPLAINT: Patient is a 76y old  Female who presents with a chief complaint of Brought by son for sudden onset of tongue heaviness, slurred speech since 9:00 PM (12 May 2017 08:43)      HPI:  77 y/o F, home, walks with assistance, lives with son, AAOx3,  w/ PMH of CVA ( Right frontal MCA , s/p TPA in ), small SAH ( acute in Right frontal region, 2017)  Fibromyalgia,  HTN ( Losartan HCTz 100/25),  DM2 ( Metformin 750mg BID, last HBA1c is 7.3 ), presented with tongue heaviness, fatigue, since 9;00 pm of 5/10/17. History was provided by the son at the bedside, according to him she was sitting on the couch and talking to him, when suddenly he noticed that she is having slurred speech, mumbling, tongue heaviness. As per patient the whole day yesterday was very busy as she was doing the packing for her upcoming trip. She reports that she was feeling tired, and wanted to sleep. Denies any headache trauma to head, blurring of vision, neck pain, chest pain, SOB cough, fever, abdominal pain, urinary , bowel complains. Son said that it looks like she is about to pass out, but did not lost consciousness Patient reports having body aches, bilateral knee pain, leg pain, neck pain. As per son her symptoms of pain likely due to fibromyalgia and also due to high intensity statin.     Patient was last admitted to [facility] in . with CVA , got TPA , admitted to ICU for monitoring, it was acute right MCA , later was found to have small right frontal SAH, also has left sided carotid artery stenosis with > 70 %. She was recommended to follow up with Vascular Surgeon Dr Dubois for possible left end arterectomy, cardio f/up with Dr Guzman ( as she was started on nimodipine for SAH ) , and also with Dr Wilson. Patient did not follow up with anyone, except her PCP, also she recently lost her  about 15 days ago. Son expresses the concern regarding statin . Currently since last week she is taking 20 mg of lipitor.     Fhx: No family history of strokes, mother  at age of 73 due to heart attack (11 May 2017 02:56)   Patient seen and examined.     PAST MEDICAL & SURGICAL HISTORY:  Type 2 diabetes mellitus  Essential hypertension  DM (diabetes mellitus)  HTN (hypertension)  Fibromyalgia  History of appendectomy  No significant past surgical history      Allergies    No Known Allergies    Intolerances        MEDICATIONS  (STANDING):  heparin  Injectable 5000Unit(s) SubCutaneous every 8 hours  aspirin  chewable 81milliGRAM(s) Oral daily  pregabalin 50milliGRAM(s) Oral at bedtime  docusate sodium 100milliGRAM(s) Oral two times a day  insulin lispro (HumaLOG) corrective regimen sliding scale  SubCutaneous at bedtime  pantoprazole    Tablet 40milliGRAM(s) Oral before breakfast  simvastatin 20milliGRAM(s) Oral at bedtime  furosemide   Injectable 40milliGRAM(s) IV Push daily  polyethylene glycol 3350 17Gram(s) Oral daily  bisacodyl Suppository 10milliGRAM(s) Rectal once  metoprolol 25milliGRAM(s) Oral two times a day  tamsulosin 0.4milliGRAM(s) Oral at bedtime      MEDICATIONS  (PRN):  acetaminophen   Tablet. 650milliGRAM(s) Oral every 6 hours PRN Mild Pain (1 - 3)  oxyCODONE  5 mG/acetaminophen 325 mG 2Tablet(s) Oral every 4 hours PRN Moderate Pain (4 - 6)  morphine  - Injectable 2milliGRAM(s) IV Push every 4 hours PRN Severe Pain (7 - 10)   Medications up to date at time of exam.    Medications up to date at time of exam.    FAMILY HISTORY:  Family history of acute myocardial infarction  Family history of cerebrovascular accident (CVA) (Sibling)      SOCIAL HISTORY  Smoking History: [   ] smoking/smoke exposure, [   ] former smoker  Living Condition: [   ] apartment, [   ] private house  Work History:   Travel History: denies recent travel  Illicit Substance Use: denies  Alcohol Use: denies    REVIEW OF SYSTEMS: as per H/p    CONSTITUTIONAL:  denies fevers, chills, sweats, weight loss    HEENT:  denies diplopia or blurred vision, sore throat or runny nose.    CARDIOVASCULAR:  denies pressure, squeezing, tightness, or heaviness about the chest; no palpitations.    RESPIRATORY:  denies SOB, cough, DAVIS, wheezing.    GASTROINTESTINAL:  denies abdominal pain, nausea, vomiting or diarrhea.    GENITOURINARY: denies dysuria, frequency or urgency.    NEUROLOGIC:  denies numbness, tingling, seizures or weakness.    PSYCHIATRIC:  denies disorder of thought or mood.    MSK: denies swelling, redness      PHYSICAL EXAMINATION:    GENERAL: The patient is a well-developed, well-nourished, in no apparent distress.     Vital Signs Last 24 Hrs  T(C): 36.9, Max: 36.9 ( @ 01:00)  T(F): 98.4, Max: 98.4 ( @ 01:00)  HR: 102 (98 - 126)  BP: 121/72 (107/65 - 158/94)  BP(mean): --  RR: 18 (16 - 21)  SpO2: 96% (91% - 100%)   (if applicable)    Chest Tube (if applicable)    HEENT: Head is normocephalic and atraumatic. Extraocular muscles are intact. Mucous membranes are moist.     NECK: Supple, no palpable adenopathy.    LUNGS: Clear to auscultation, no wheezing, rales, or rhonchi.    HEART: Regular rate and rhythm without murmur.    ABDOMEN: Soft, nontender, and nondistended.  No hepatosplenomegaly is noted.    EXTREMITIES: Without any cyanosis, clubbing, rash, lesions or edema.    NEUROLOGIC: Awake, alert, oriented.     SKIN: Warm, dry, good turgor.      LABS:                        10.8   14.2  )-----------( 301      ( 17 May 2017 07:02 )             31.8     -    139  |  101  |  10  ----------------------------<  164<H>  3.8   |  27  |  0.82    Ca    8.7      17 May 2017 07:02  Phos  3.2     -  Mg     1.9                               MICROBIOLOGY: (if applicable)    RADIOLOGY & ADDITIONAL STUDIES:  EKG:   CXR:  ECHO:  CTPA reviewed. no infiltrate, ..b/l pulmo edema      Patient is a 77 y/o F, home, walks with assistance, lives with son, AAOx3,  w/ PMH of CVA ( Right frontal MCA , s/p TPA in ), small SAH ( acute in Right frontal region, 2017)  Fibromyalgia,  HTN ( Losartan HCTz 100/25),  DM2 ( Metformin 750mg BID, last HBA1c is 7.3 ), presented with TIA and found to have 70% stenosis of left carotid artery requiring carotid artery endarterectomy today. Admit to ICU for post op monitoring.  Pat was down graded to surgical floor. Last nigh pat had episode of respiratory distress ant CTPA done today shows pulmonary  edema due to volume over load.  sugg  continue lasix  continue meds  blood pressure control  incentive spirometry

## 2017-05-17 NOTE — PROGRESS NOTE ADULT - SUBJECTIVE AND OBJECTIVE BOX
Patient examined at bedside, complaining of some left neck pain  No nausea, no vomiting  Tolerating diet  No changes in vision or slurred speech  Krueger in place HANANE was removed yesterday   Given lasix last night, krueger was place to monitor UO    T(F): 98, Max: 98.8 (05-16 @ 15:34)  HR: 108 (84 - 126)  BP: 118/73 (90/48 - 158/94)  RR: 19 (9 - 21)  SpO2: 98% (91% - 100%)  Wt(kg): --      Physical Exam  General: AAOx3, No acute distress  Neck: soft, supple, no JVD, incision site is clean, steri strips intact and dry, induration under incision site, no fluctuance,

## 2017-05-17 NOTE — PROGRESS NOTE ADULT - ASSESSMENT
SOB, likely to fluid overload from urinary retention.   CT angio shows CHF, no PE.   Suggest:  EKG.  Diuresis.  Follow O2 sat. Cardiology follow-up.

## 2017-05-17 NOTE — PROGRESS NOTE ADULT - SUBJECTIVE AND OBJECTIVE BOX
Patient seen and examined.   Time of visit: 1430    MEDICATIONS  (STANDING):  heparin  Injectable 5000Unit(s) SubCutaneous every 8 hours  aspirin  chewable 81milliGRAM(s) Oral daily  pregabalin 50milliGRAM(s) Oral at bedtime  docusate sodium 100milliGRAM(s) Oral two times a day  insulin lispro (HumaLOG) corrective regimen sliding scale  SubCutaneous at bedtime  pantoprazole    Tablet 40milliGRAM(s) Oral before breakfast  simvastatin 20milliGRAM(s) Oral at bedtime  furosemide   Injectable 40milliGRAM(s) IV Push daily  polyethylene glycol 3350 17Gram(s) Oral daily  bisacodyl Suppository 10milliGRAM(s) Rectal once  metoprolol 25milliGRAM(s) Oral two times a day  tamsulosin 0.4milliGRAM(s) Oral at bedtime      MEDICATIONS  (PRN):  acetaminophen   Tablet. 650milliGRAM(s) Oral every 6 hours PRN Mild Pain (1 - 3)  oxyCODONE  5 mG/acetaminophen 325 mG 2Tablet(s) Oral every 4 hours PRN Moderate Pain (4 - 6)  morphine  - Injectable 2milliGRAM(s) IV Push every 4 hours PRN Severe Pain (7 - 10)   Medications up to date at time of exam.      PHYSICAL EXAMINATION:  Patient has no new complaints.  GENERAL: The patient is a well-developed, well-nourished, in no apparent distress.     Vital Signs Last 24 Hrs  T(C): 36.9, Max: 36.9 (05-17 @ 01:00)  T(F): 98.4, Max: 98.4 (05-17 @ 01:00)  HR: 102 (98 - 126)  BP: 121/72 (107/65 - 158/94)  BP(mean): --  RR: 18 (16 - 21)  SpO2: 96% (91% - 100%)   (if applicable)      HEENT: Head is normocephalic and atraumatic. Extraocular muscles are intact. Mucous membranes are moist.     NECK: Supple, no palpable adenopathy.    LUNGS: Clear to auscultation, no wheezing, rales, or rhonchi, + L carotid steristrips, + crackles B/L lower lobes    HEART: Regular rate and rhythm without murmur.    ABDOMEN: Soft, nontender, and nondistended.  No hepatosplenomegaly is noted.    EXTREMITIES: Without any cyanosis, clubbing, rash, lesions or edema.    NEUROLOGIC: Awake, alert, oriented.     SKIN: Warm, dry, good turgor.      LABS:                        10.8   14.2  )-----------( 301      ( 17 May 2017 07:02 )             31.8     05-17    139  |  101  |  10  ----------------------------<  164<H>  3.8   |  27  |  0.82    Ca    8.7      17 May 2017 07:02  Phos  3.2     05-17  Mg     1.9     05-17        MICROBIOLOGY: (if applicable)    RADIOLOGY & ADDITIONAL STUDIES:  EKG:   CXR:  ECHO:    IMPRESSION: 76y Female PAST MEDICAL & SURGICAL HISTORY:  Type 2 diabetes mellitus  Essential hypertension  DM (diabetes mellitus)  HTN (hypertension)  Fibromyalgia  History of appendectomy  No significant past surgical history    p/w slurred speech, mumbling, tongue heaviness due to TIA, patient with known L carotid artery stenosis > 70%, patient s/p L CEA    RECOMMENDATIONS:     - con't with lasix   - BP controlled   - out patient f/u with vascular   - plan of care discussed with son

## 2017-05-17 NOTE — PROGRESS NOTE ADULT - ASSESSMENT
Cellulitis with lymphedema, improving on Daptomycin and leg elevation.   Vomiting related to duodenal ulcer is resolved.   DAMIÁN, likely from ATN, is improving.   ID consultation, seen and appreciated.   Will continue Daptomycin for two to four more days, then switch to doxycycline.   Will also add ammonium lactate twice daily.

## 2017-05-17 NOTE — PROGRESS NOTE ADULT - SUBJECTIVE AND OBJECTIVE BOX
I examined this patient this morning and again this afternoon.     Patient is a 76y old  Female who presents with a chief complaint of Brought by son for sudden onset of tongue heaviness, slurred speech since 9:00 PM (12 May 2017 08:43)  Patient was known to have 70 % occulusion of the left carotid artery.  During hospitalization she underwent a left carotid endarterectomy. She recovered well in ICU.     INTERVAL HPI/OVERNIGHT EVENTS:  Last night she developed shortness of breath.  She was treated with a diuretic.  CXR showed pulmonary congestion.     MEDICATIONS  (STANDING):  heparin  Injectable 5000Unit(s) SubCutaneous every 8 hours  aspirin  chewable 81milliGRAM(s) Oral daily  pregabalin 50milliGRAM(s) Oral at bedtime  docusate sodium 100milliGRAM(s) Oral two times a day  insulin lispro (HumaLOG) corrective regimen sliding scale  SubCutaneous at bedtime  pantoprazole    Tablet 40milliGRAM(s) Oral before breakfast  simvastatin 20milliGRAM(s) Oral at bedtime  furosemide   Injectable 40milliGRAM(s) IV Push daily  polyethylene glycol 3350 17Gram(s) Oral daily  metoprolol 25milliGRAM(s) Oral two times a day  tamsulosin 0.4milliGRAM(s) Oral at bedtime    MEDICATIONS  (PRN):  acetaminophen   Tablet. 650milliGRAM(s) Oral every 6 hours PRN Mild Pain (1 - 3)  oxyCODONE  5 mG/acetaminophen 325 mG 2Tablet(s) Oral every 4 hours PRN Moderate Pain (4 - 6)  morphine  - Injectable 2milliGRAM(s) IV Push every 4 hours PRN Severe Pain (7 - 10)    Allergies    No Known Allergies        Vital Signs Last 24 Hrs  T(C): 36.8, Max: 36.9 (05-17 @ 01:00)  T(F): 98.2, Max: 98.4 (05-17 @ 01:00)  HR: 91 (87 - 126)  BP: 124/57 (97/45 - 158/94)  BP(mean): --  RR: 17 (16 - 21)  SpO2: 100% (91% - 100%)      LABS:                        10.8   14.2  )-----------( 301      ( 17 May 2017 07:02 )             31.8     05-17    139  |  101  |  10  ----------------------------<  164<H>  3.8   |  27  |  0.82    Ca    8.7      17 May 2017 07:02  Phos  3.2     05-17  Mg     1.9     05-17          CAPILLARY BLOOD GLUCOSE  230 (17 May 2017 16:03)  241 (16 May 2017 21:18)      RADIOLOGY & ADDITIONAL TESTS:    XRay:    CT Scan:    Imaging Personally Reviewed:  [ ] YES  [ ] NO    Consultant(s) Notes Reviewed:  [ ] YES  [ ] NO    Care Discussed with Consultants/Other Providers [ ] YES  [ ] NO

## 2017-05-17 NOTE — PROGRESS NOTE ADULT - SUBJECTIVE AND OBJECTIVE BOX
76y Female    Meds: MEDICATIONS  (STANDING):  heparin  Injectable 5000Unit(s) SubCutaneous every 8 hours  aspirin  chewable 81milliGRAM(s) Oral daily  pregabalin 50milliGRAM(s) Oral at bedtime  docusate sodium 100milliGRAM(s) Oral two times a day  insulin lispro (HumaLOG) corrective regimen sliding scale  SubCutaneous at bedtime  pantoprazole    Tablet 40milliGRAM(s) Oral before breakfast  simvastatin 20milliGRAM(s) Oral at bedtime  furosemide   Injectable 40milliGRAM(s) IV Push daily  polyethylene glycol 3350 17Gram(s) Oral daily  bisacodyl Suppository 10milliGRAM(s) Rectal once  metoprolol 25milliGRAM(s) Oral two times a day  tamsulosin 0.4milliGRAM(s) Oral at bedtime    MEDICATIONS  (PRN):  acetaminophen   Tablet. 650milliGRAM(s) Oral every 6 hours PRN Mild Pain (1 - 3)  oxyCODONE  5 mG/acetaminophen 325 mG 2Tablet(s) Oral every 4 hours PRN Moderate Pain (4 - 6)  morphine  - Injectable 2milliGRAM(s) IV Push every 4 hours PRN Severe Pain (7 - 10)      Allergies    No Known Allergies    Intolerances        VITALS:  Vital Signs Last 24 Hrs  T(C): 36.9, Max: 37.1 (05-16 @ 15:34)  T(F): 98.4, Max: 98.8 (05-16 @ 15:34)  HR: 102 (98 - 126)  BP: 121/72 (107/65 - 158/94)  BP(mean): --  RR: 18 (16 - 21)  SpO2: 96% (91% - 100%)    LABS/DIAGNOSTIC TESTS:                          10.8   14.2  )-----------( 301      ( 17 May 2017 07:02 )             31.8         05-17    139  |  101  |  10  ----------------------------<  164<H>  3.8   |  27  |  0.82    Ca    8.7      17 May 2017 07:02  Phos  3.2     05-17  Mg     1.9     05-17            CULTURES:       RADIOLOGY:      ROS:  [  ] UNABLE TO ELICIT

## 2017-05-17 NOTE — PROGRESS NOTE ADULT - EXTREMITIES COMMENTS
Left leg is still larger than right.  Still erythema at distal extremity.  Scaling of skin bilaterally.

## 2017-05-18 LAB
ALBUMIN SERPL ELPH-MCNC: 3 G/DL — LOW (ref 3.5–5)
ALP SERPL-CCNC: 111 U/L — SIGNIFICANT CHANGE UP (ref 40–120)
ALT FLD-CCNC: 54 U/L DA — SIGNIFICANT CHANGE UP (ref 10–60)
ANION GAP SERPL CALC-SCNC: 9 MMOL/L — SIGNIFICANT CHANGE UP (ref 5–17)
ANISOCYTOSIS BLD QL: SLIGHT — SIGNIFICANT CHANGE UP
APPEARANCE UR: CLEAR — SIGNIFICANT CHANGE UP
AST SERPL-CCNC: 92 U/L — HIGH (ref 10–40)
BACTERIA # UR AUTO: ABNORMAL /HPF
BASE EXCESS BLDA CALC-SCNC: 3.2 MMOL/L — HIGH (ref -2–2)
BILIRUB SERPL-MCNC: 1.1 MG/DL — SIGNIFICANT CHANGE UP (ref 0.2–1.2)
BILIRUB UR-MCNC: NEGATIVE — SIGNIFICANT CHANGE UP
BLOOD GAS COMMENTS ARTERIAL: SIGNIFICANT CHANGE UP
BUN SERPL-MCNC: 12 MG/DL — SIGNIFICANT CHANGE UP (ref 7–18)
CALCIUM SERPL-MCNC: 9.1 MG/DL — SIGNIFICANT CHANGE UP (ref 8.4–10.5)
CHLORIDE SERPL-SCNC: 98 MMOL/L — SIGNIFICANT CHANGE UP (ref 96–108)
CO2 SERPL-SCNC: 30 MMOL/L — SIGNIFICANT CHANGE UP (ref 22–31)
COLOR SPEC: YELLOW — SIGNIFICANT CHANGE UP
COMMENT - URINE: SIGNIFICANT CHANGE UP
CREAT SERPL-MCNC: 0.86 MG/DL — SIGNIFICANT CHANGE UP (ref 0.5–1.3)
DIFF PNL FLD: ABNORMAL
EPI CELLS # UR: ABNORMAL (ref 0–10)
GLUCOSE SERPL-MCNC: 179 MG/DL — HIGH (ref 70–99)
GLUCOSE UR QL: NEGATIVE — SIGNIFICANT CHANGE UP
HCO3 BLDA-SCNC: 27 MMOL/L — SIGNIFICANT CHANGE UP (ref 23–27)
HCT VFR BLD CALC: 33.4 % — LOW (ref 34.5–45)
HGB BLD-MCNC: 12 G/DL — SIGNIFICANT CHANGE UP (ref 11.5–15.5)
HOROWITZ INDEX BLDA+IHG-RTO: 28 — SIGNIFICANT CHANGE UP
KETONES UR-MCNC: ABNORMAL
LACTATE SERPL-SCNC: 2.1 MMOL/L — HIGH (ref 0.7–2)
LACTATE SERPL-SCNC: 2.8 MMOL/L — HIGH (ref 0.7–2)
LEUKOCYTE ESTERASE UR-ACNC: ABNORMAL
LYMPHOCYTES # BLD AUTO: 4 % — LOW (ref 13–44)
MACROCYTES BLD QL: SLIGHT — SIGNIFICANT CHANGE UP
MANUAL DIF COMMENT BLD-IMP: SIGNIFICANT CHANGE UP
MCHC RBC-ENTMCNC: 30.7 PG — SIGNIFICANT CHANGE UP (ref 27–34)
MCHC RBC-ENTMCNC: 36 GM/DL — SIGNIFICANT CHANGE UP (ref 32–36)
MCV RBC AUTO: 85.2 FL — SIGNIFICANT CHANGE UP (ref 80–100)
MONOCYTES NFR BLD AUTO: 8 % — SIGNIFICANT CHANGE UP (ref 2–14)
NEUTROPHILS NFR BLD AUTO: 85 % — HIGH (ref 43–77)
NEUTS BAND # BLD: 3 % — SIGNIFICANT CHANGE UP (ref 0–8)
NITRITE UR-MCNC: NEGATIVE — SIGNIFICANT CHANGE UP
OVALOCYTES BLD QL SMEAR: SIGNIFICANT CHANGE UP
PCO2 BLDA: 38 MMHG — SIGNIFICANT CHANGE UP (ref 32–46)
PH BLDA: 7.46 — HIGH (ref 7.35–7.45)
PH UR: 6.5 — SIGNIFICANT CHANGE UP (ref 5–8)
PLAT MORPH BLD: NORMAL — SIGNIFICANT CHANGE UP
PLATELET # BLD AUTO: 310 K/UL — SIGNIFICANT CHANGE UP (ref 150–400)
PO2 BLDA: 120 MMHG — HIGH (ref 74–108)
POLYCHROMASIA BLD QL SMEAR: SIGNIFICANT CHANGE UP
POTASSIUM SERPL-MCNC: 3.8 MMOL/L — SIGNIFICANT CHANGE UP (ref 3.5–5.3)
POTASSIUM SERPL-SCNC: 3.8 MMOL/L — SIGNIFICANT CHANGE UP (ref 3.5–5.3)
PROT SERPL-MCNC: 7.6 G/DL — SIGNIFICANT CHANGE UP (ref 6–8.3)
PROT UR-MCNC: 30 MG/DL
RBC # BLD: 3.92 M/UL — SIGNIFICANT CHANGE UP (ref 3.8–5.2)
RBC # FLD: 13.9 % — SIGNIFICANT CHANGE UP (ref 10.3–14.5)
RBC BLD AUTO: ABNORMAL
RBC CASTS # UR COMP ASSIST: ABNORMAL /HPF (ref 0–2)
SAO2 % BLDA: 96 % — SIGNIFICANT CHANGE UP (ref 92–96)
SODIUM SERPL-SCNC: 137 MMOL/L — SIGNIFICANT CHANGE UP (ref 135–145)
SP GR SPEC: 1.01 — SIGNIFICANT CHANGE UP (ref 1.01–1.02)
STOMATOCYTES BLD QL SMEAR: PRESENT — SIGNIFICANT CHANGE UP
UROBILINOGEN FLD QL: 4
WBC # BLD: 19.8 K/UL — HIGH (ref 3.8–10.5)
WBC # FLD AUTO: 19.8 K/UL — HIGH (ref 3.8–10.5)
WBC UR QL: ABNORMAL /HPF (ref 0–5)

## 2017-05-18 PROCEDURE — 99232 SBSQ HOSP IP/OBS MODERATE 35: CPT

## 2017-05-18 PROCEDURE — 71010: CPT | Mod: 26

## 2017-05-18 RX ORDER — ACETAMINOPHEN 500 MG
650 TABLET ORAL EVERY 6 HOURS
Qty: 0 | Refills: 0 | Status: DISCONTINUED | OUTPATIENT
Start: 2017-05-18 | End: 2017-05-24

## 2017-05-18 RX ORDER — SODIUM CHLORIDE 9 MG/ML
1000 INJECTION INTRAMUSCULAR; INTRAVENOUS; SUBCUTANEOUS
Qty: 0 | Refills: 0 | Status: DISCONTINUED | OUTPATIENT
Start: 2017-05-18 | End: 2017-05-19

## 2017-05-18 RX ORDER — SODIUM CHLORIDE 9 MG/ML
1000 INJECTION, SOLUTION INTRAVENOUS
Qty: 0 | Refills: 0 | Status: DISCONTINUED | OUTPATIENT
Start: 2017-05-18 | End: 2017-05-24

## 2017-05-18 RX ORDER — SODIUM CHLORIDE 9 MG/ML
500 INJECTION INTRAMUSCULAR; INTRAVENOUS; SUBCUTANEOUS ONCE
Qty: 0 | Refills: 0 | Status: COMPLETED | OUTPATIENT
Start: 2017-05-18 | End: 2017-05-18

## 2017-05-18 RX ORDER — INSULIN LISPRO 100/ML
VIAL (ML) SUBCUTANEOUS
Qty: 0 | Refills: 0 | Status: DISCONTINUED | OUTPATIENT
Start: 2017-05-18 | End: 2017-05-24

## 2017-05-18 RX ORDER — DEXTROSE 50 % IN WATER 50 %
12.5 SYRINGE (ML) INTRAVENOUS ONCE
Qty: 0 | Refills: 0 | Status: DISCONTINUED | OUTPATIENT
Start: 2017-05-18 | End: 2017-05-19

## 2017-05-18 RX ORDER — DEXTROSE 50 % IN WATER 50 %
1 SYRINGE (ML) INTRAVENOUS ONCE
Qty: 0 | Refills: 0 | Status: DISCONTINUED | OUTPATIENT
Start: 2017-05-18 | End: 2017-05-19

## 2017-05-18 RX ORDER — ALBUTEROL 90 UG/1
2.5 AEROSOL, METERED ORAL EVERY 6 HOURS
Qty: 0 | Refills: 0 | Status: DISCONTINUED | OUTPATIENT
Start: 2017-05-18 | End: 2017-05-20

## 2017-05-18 RX ORDER — DEXTROSE 50 % IN WATER 50 %
25 SYRINGE (ML) INTRAVENOUS ONCE
Qty: 0 | Refills: 0 | Status: DISCONTINUED | OUTPATIENT
Start: 2017-05-18 | End: 2017-05-19

## 2017-05-18 RX ORDER — METOPROLOL TARTRATE 50 MG
12.5 TABLET ORAL EVERY 12 HOURS
Qty: 0 | Refills: 0 | Status: DISCONTINUED | OUTPATIENT
Start: 2017-05-18 | End: 2017-05-22

## 2017-05-18 RX ORDER — PIPERACILLIN AND TAZOBACTAM 4; .5 G/20ML; G/20ML
3.38 INJECTION, POWDER, LYOPHILIZED, FOR SOLUTION INTRAVENOUS EVERY 8 HOURS
Qty: 0 | Refills: 0 | Status: DISCONTINUED | OUTPATIENT
Start: 2017-05-18 | End: 2017-05-22

## 2017-05-18 RX ORDER — VANCOMYCIN HCL 1 G
1000 VIAL (EA) INTRAVENOUS ONCE
Qty: 0 | Refills: 0 | Status: COMPLETED | OUTPATIENT
Start: 2017-05-18 | End: 2017-05-18

## 2017-05-18 RX ORDER — PIPERACILLIN AND TAZOBACTAM 4; .5 G/20ML; G/20ML
3.38 INJECTION, POWDER, LYOPHILIZED, FOR SOLUTION INTRAVENOUS ONCE
Qty: 0 | Refills: 0 | Status: COMPLETED | OUTPATIENT
Start: 2017-05-18 | End: 2017-05-18

## 2017-05-18 RX ORDER — GLUCAGON INJECTION, SOLUTION 0.5 MG/.1ML
1 INJECTION, SOLUTION SUBCUTANEOUS ONCE
Qty: 0 | Refills: 0 | Status: DISCONTINUED | OUTPATIENT
Start: 2017-05-18 | End: 2017-05-19

## 2017-05-18 RX ADMIN — PANTOPRAZOLE SODIUM 40 MILLIGRAM(S): 20 TABLET, DELAYED RELEASE ORAL at 06:43

## 2017-05-18 RX ADMIN — SODIUM CHLORIDE 1000 MILLILITER(S): 9 INJECTION INTRAMUSCULAR; INTRAVENOUS; SUBCUTANEOUS at 22:02

## 2017-05-18 RX ADMIN — TAMSULOSIN HYDROCHLORIDE 0.4 MILLIGRAM(S): 0.4 CAPSULE ORAL at 21:59

## 2017-05-18 RX ADMIN — PIPERACILLIN AND TAZOBACTAM 25 GRAM(S): 4; .5 INJECTION, POWDER, LYOPHILIZED, FOR SOLUTION INTRAVENOUS at 14:07

## 2017-05-18 RX ADMIN — Medication 100 MILLIGRAM(S): at 18:27

## 2017-05-18 RX ADMIN — PIPERACILLIN AND TAZOBACTAM 25 GRAM(S): 4; .5 INJECTION, POWDER, LYOPHILIZED, FOR SOLUTION INTRAVENOUS at 06:41

## 2017-05-18 RX ADMIN — ALBUTEROL 2.5 MILLIGRAM(S): 90 AEROSOL, METERED ORAL at 21:08

## 2017-05-18 RX ADMIN — PIPERACILLIN AND TAZOBACTAM 25 GRAM(S): 4; .5 INJECTION, POWDER, LYOPHILIZED, FOR SOLUTION INTRAVENOUS at 22:03

## 2017-05-18 RX ADMIN — Medication 8: at 11:47

## 2017-05-18 RX ADMIN — HEPARIN SODIUM 5000 UNIT(S): 5000 INJECTION INTRAVENOUS; SUBCUTANEOUS at 06:42

## 2017-05-18 RX ADMIN — ALBUTEROL 2.5 MILLIGRAM(S): 90 AEROSOL, METERED ORAL at 09:55

## 2017-05-18 RX ADMIN — HEPARIN SODIUM 5000 UNIT(S): 5000 INJECTION INTRAVENOUS; SUBCUTANEOUS at 14:08

## 2017-05-18 RX ADMIN — Medication 81 MILLIGRAM(S): at 11:38

## 2017-05-18 RX ADMIN — Medication 650 MILLIGRAM(S): at 18:25

## 2017-05-18 RX ADMIN — Medication 650 MILLIGRAM(S): at 22:19

## 2017-05-18 RX ADMIN — Medication 650 MILLIGRAM(S): at 19:11

## 2017-05-18 RX ADMIN — HEPARIN SODIUM 5000 UNIT(S): 5000 INJECTION INTRAVENOUS; SUBCUTANEOUS at 21:59

## 2017-05-18 RX ADMIN — Medication 650 MILLIGRAM(S): at 04:14

## 2017-05-18 RX ADMIN — Medication 4: at 18:23

## 2017-05-18 RX ADMIN — ALBUTEROL 2.5 MILLIGRAM(S): 90 AEROSOL, METERED ORAL at 15:47

## 2017-05-18 RX ADMIN — Medication 12.5 MILLIGRAM(S): at 06:42

## 2017-05-18 RX ADMIN — Medication 100 MILLIGRAM(S): at 06:43

## 2017-05-18 RX ADMIN — PIPERACILLIN AND TAZOBACTAM 200 GRAM(S): 4; .5 INJECTION, POWDER, LYOPHILIZED, FOR SOLUTION INTRAVENOUS at 04:58

## 2017-05-18 RX ADMIN — Medication 250 MILLIGRAM(S): at 05:32

## 2017-05-18 RX ADMIN — Medication 2: at 22:03

## 2017-05-18 NOTE — CONSULT NOTE ADULT - SUBJECTIVE AND OBJECTIVE BOX
HPI:  75 y/o F, home, walks with assistance, lives with son, AAOx3,  w/ PMH of CVA ( Right frontal MCA , s/p TPA in ), small SAH ( acute in Right frontal region, 2017)  Fibromyalgia,  HTN ( Losartan HCTz 100/25),  DM2 ( Metformin 750mg BID, last HBA1c is 7.3 ), presented with tongue heaviness, fatigue, since 9;00 pm of 5/10/17. History was provided by the son at the bedside, according to him she was sitting on the couch and talking to him, when suddenly he noticed that she is having slurred speech, mumbling, tongue heaviness. As per patient the whole day yesterday was very busy as she was doing the packing for her upcoming trip. She reports that she was feeling tired, and wanted to sleep. Denies any headache trauma to head, blurring of vision, neck pain, chest pain, SOB cough, fever, abdominal pain, urinary , bowel complains. Son said that it looks like she is about to pass out, but did not lost consciousness Patient reports having body aches, bilateral knee pain, leg pain, neck pain. As per son her symptoms of pain likely due to fibromyalgia and also due to high intensity statin.     Patient was last admitted to [facility] in . with CVA , got TPA , admitted to ICU for monitoring, it was acute right MCA , later was found to have small right frontal SAH, also has left sided carotid artery stenosis with > 70 %. She was recommended to follow up with Vascular Surgeon Dr Dubois for possible left end arterectomy, cardio f/up with Dr Guzman ( as she was started on nimodipine for SAH ) , and also with Dr Wilson. Patient did not follow up with anyone, except her PCP, also she recently lost her  about 15 days ago. Son expresses the concern regarding statin . Currently since last week she is taking 20 mg of lipitor.     Fhx: No family history of strokes, mother  at age of 73 due to heart attack (11 May 2017 02:56)      PAST MEDICAL & SURGICAL HISTORY:  Type 2 diabetes mellitus  Essential hypertension  DM (diabetes mellitus)  HTN (hypertension)  Fibromyalgia  History of appendectomy  No significant past surgical history      No Known Allergies      Meds:  heparin  Injectable 5000Unit(s) SubCutaneous every 8 hours  aspirin  chewable 81milliGRAM(s) Oral daily  acetaminophen   Tablet. 650milliGRAM(s) Oral every 6 hours PRN  pregabalin 50milliGRAM(s) Oral at bedtime  docusate sodium 100milliGRAM(s) Oral two times a day  insulin lispro (HumaLOG) corrective regimen sliding scale  SubCutaneous at bedtime  pantoprazole    Tablet 40milliGRAM(s) Oral before breakfast  simvastatin 20milliGRAM(s) Oral at bedtime  furosemide   Injectable 40milliGRAM(s) IV Push daily  tamsulosin 0.4milliGRAM(s) Oral at bedtime  acetaminophen  Suppository 650milliGRAM(s) Rectal every 6 hours PRN  metoprolol 12.5milliGRAM(s) Oral every 12 hours  piperacillin/tazobactam IVPB. 3.375Gram(s) IV Intermittent every 8 hours  ALBUTerol    0.083% 2.5milliGRAM(s) Nebulizer every 6 hours  insulin lispro (HumaLOG) corrective regimen sliding scale  SubCutaneous three times a day before meals  dextrose 5%. 1000milliLiter(s) IV Continuous <Continuous>  dextrose Gel 1Dose(s) Oral once PRN  dextrose 50% Injectable 12.5Gram(s) IV Push once  dextrose 50% Injectable 25Gram(s) IV Push once  dextrose 50% Injectable 25Gram(s) IV Push once  glucagon  Injectable 1milliGRAM(s) IntraMuscular once PRN      SOCIAL HISTORY:  Smoker:  YES / NO        PACK YEARS:                         WHEN QUIT?  ETOH use:  YES / NO               FREQUENCY / QUANTITY:  Ilicit Drug use:  YES / NO  Occupation:  Assisted device use (Cane / Walker):  Live with:    FAMILY HISTORY:  Family history of acute myocardial infarction  Family history of cerebrovascular accident (CVA) (Sibling)      VITALS:  Vital Signs Last 24 Hrs  T(C): 37.6, Max: 39.4 (-18 @ 02:15)  T(F): 99.6, Max: 103 (- @ 02:15)  HR: 90 (87 - 126)  BP: 99/67 (97/44 - 143/69)  BP(mean): --  RR: 17 (17 - 99)  SpO2: 98% (18% - 100%)    LABS/DIAGNOSTIC TESTS:                          12.0   19.8  )-----------( 310      ( 18 May 2017 04:12 )             33.4     WBC Count: 19.8 K/uL ( @ 04:12)  WBC Count: 14.2 K/uL ( @ 07:02)  WBC Count: 8.5 K/uL ( @ 07:03)          137  |  98  |  12  ----------------------------<  179<H>  3.8   |  30  |  0.86    Ca    9.1      18 May 2017 04:12  Phos  3.2       Mg     1.9         TPro  7.6  /  Alb  3.0<L>  /  TBili  1.1  /  DBili  x   /  AST  92<H>  /  ALT  54  /  AlkPhos  111        Urinalysis Basic - ( 18 May 2017 03:35 )    Color: Yellow / Appearance: Clear / S.010 / pH: x  Gluc: x / Ketone: Large  / Bili: Negative / Urobili: 4   Blood: x / Protein: 30 mg/dL / Nitrite: Negative   Leuk Esterase: Moderate / RBC: 2-5 /HPF / WBC 26-50 /HPF   Sq Epi: x / Non Sq Epi: Few / Bacteria: Many /HPF        LIVER FUNCTIONS - ( 18 May 2017 04:12 )  Alb: 3.0 g/dL / Pro: 7.6 g/dL / ALK PHOS: 111 U/L / ALT: 54 U/L DA / AST: 92 U/L / GGT: x                 LACTATE:Lactate, Blood: 2.1 mmol/L ( @ 03:35)      ABG - ABG - ( 18 May 2017 08:55 )  pH: 7.46  /  pCO2: 38    /  pO2: 120   / HCO3: 27    / Base Excess: 3.2   /  SaO2: 96                  CULTURES:       RADIOLOGY:      ROS  [  ] UNABLE TO ELICIT HPI:  77 y/o F, home, walks with assistance, lives with son, AAOx3,  w/ PMH of CVA ( Right frontal MCA , s/p TPA in ), small SAH ( acute in Right frontal region, 2017)  Fibromyalgia,  HTN,  DM2 , presented with tongue heaviness, fatigue, since  5/10/17, the son noticed that she was having slurred speech, mumbling, tongue heaviness.   pt admitted with aTIA and found to have left carotid stenosis and is s/p left carotid endarterectomy on 5/15/17. asked to eval pt as she developed a high fever today along with an increasing wbc count.    Fhx: No family history of strokes, mother  at age of 73 due to heart attack (11 May 2017 02:56)      PAST MEDICAL & SURGICAL HISTORY:  Type 2 diabetes mellitus  Essential hypertension  DM (diabetes mellitus)  HTN (hypertension)  Fibromyalgia  History of appendectomy  No significant past surgical history      No Known Allergies      Meds:  heparin  Injectable 5000Unit(s) SubCutaneous every 8 hours  aspirin  chewable 81milliGRAM(s) Oral daily  acetaminophen   Tablet. 650milliGRAM(s) Oral every 6 hours PRN  pregabalin 50milliGRAM(s) Oral at bedtime  docusate sodium 100milliGRAM(s) Oral two times a day  insulin lispro (HumaLOG) corrective regimen sliding scale  SubCutaneous at bedtime  pantoprazole    Tablet 40milliGRAM(s) Oral before breakfast  simvastatin 20milliGRAM(s) Oral at bedtime  furosemide   Injectable 40milliGRAM(s) IV Push daily  tamsulosin 0.4milliGRAM(s) Oral at bedtime  acetaminophen  Suppository 650milliGRAM(s) Rectal every 6 hours PRN  metoprolol 12.5milliGRAM(s) Oral every 12 hours  piperacillin/tazobactam IVPB. 3.375Gram(s) IV Intermittent every 8 hours  ALBUTerol    0.083% 2.5milliGRAM(s) Nebulizer every 6 hours  insulin lispro (HumaLOG) corrective regimen sliding scale  SubCutaneous three times a day before meals  dextrose 5%. 1000milliLiter(s) IV Continuous <Continuous>  dextrose Gel 1Dose(s) Oral once PRN  dextrose 50% Injectable 12.5Gram(s) IV Push once  dextrose 50% Injectable 25Gram(s) IV Push once  dextrose 50% Injectable 25Gram(s) IV Push once  glucagon  Injectable 1milliGRAM(s) IntraMuscular once PRN      SOCIAL HISTORY:  Smoker: no  ETOH use:no  Ilicit Drug use:no    FAMILY HISTORY:  Family history of acute myocardial infarction  Family history of cerebrovascular accident (CVA) (Sibling)      VITALS:  Vital Signs Last 24 Hrs  T(C): 37.6, Max: 39.4 ( @ 02:15)  T(F): 99.6, Max: 103 ( @ 02:15)  HR: 90 (87 - 126)  BP: 99/67 (97/44 - 143/69)  BP(mean): --  RR: 17 (17 - 99)  SpO2: 98% (18% - 100%)    LABS/DIAGNOSTIC TESTS:                          12.0   19.8  )-----------( 310      ( 18 May 2017 04:12 )             33.4     WBC Count: 19.8 K/uL ( @ 04:12)  WBC Count: 14.2 K/uL ( @ 07:02)  WBC Count: 8.5 K/uL ( @ 07:03)          137  |  98  |  12  ----------------------------<  179<H>  3.8   |  30  |  0.86    Ca    9.1      18 May 2017 04:12  Phos  3.2       Mg     1.9         TPro  7.6  /  Alb  3.0<L>  /  TBili  1.1  /  DBili  x   /  AST  92<H>  /  ALT  54  /  AlkPhos  111        Urinalysis Basic - ( 18 May 2017 03:35 )    Color: Yellow / Appearance: Clear / S.010 / pH: x  Gluc: x / Ketone: Large  / Bili: Negative / Urobili: 4   Blood: x / Protein: 30 mg/dL / Nitrite: Negative   Leuk Esterase: Moderate / RBC: 2-5 /HPF / WBC 26-50 /HPF   Sq Epi: x / Non Sq Epi: Few / Bacteria: Many /HPF        LIVER FUNCTIONS - ( 18 May 2017 04:12 )  Alb: 3.0 g/dL / Pro: 7.6 g/dL / ALK PHOS: 111 U/L / ALT: 54 U/L DA / AST: 92 U/L / GGT: x                 LACTATE:Lactate, Blood: 2.1 mmol/L ( @ 03:35)      ABG - ABG - ( 18 May 2017 08:55 )  pH: 7.46  /  pCO2: 38    /  pO2: 120   / HCO3: 27    / Base Excess: 3.2   /  SaO2: 96                  CULTURES: pending          RADIOLOGY:EXAM:  CHEST-PORTABLE URGENT                            PROCEDURE DATE:  2017        INTERPRETATION:  PORTABLE CHEST X-RAY    HISTORY: Concern for pneumonia.. Fever 39C/103F, post-operative patient..    COMPARISON: 2017.    FINDINGS:  Study is limited by portable technique and patient rotation.  There is mild diffuse interstitial prominence. Left basilar subsegmental   atelectasis.  No pneumothorax or pleural effusion.   The cardiac silhouette is not accurately assessed by AP technique.    IMPRESSION:  Mild diffuse interstitial prominence. Left basilar subsegmental   atelectasis.      ROS  [  ] UNABLE TO ELICIT HPI:  77 y/o F, home, walks with assistance, lives with son, AAOx3,  w/ PMH of CVA ( Right frontal MCA , s/p TPA in ), small SAH ( acute in Right frontal region, 2017)  Fibromyalgia,  HTN,  DM2 , presented with tongue heaviness, fatigue, since  5/10/17, the son noticed that she was having slurred speech, mumbling, tongue heaviness.   pt admitted with aTIA and found to have left carotid stenosis and is s/p left carotid endarterectomy on 5/15/17. asked to eval pt as she developed a high fever today along with an increasing wbc count.pt is very lethargic but arousable to verbal stimuli ,she is able to answer some questions only and is a little confused and so her history is not very reliable.    Fhx: No family history of strokes, mother  at age of 73 due to heart attack (11 May 2017 02:56)      PAST MEDICAL & SURGICAL HISTORY:  Type 2 diabetes mellitus  Essential hypertension  DM (diabetes mellitus)  HTN (hypertension)  Fibromyalgia  History of appendectomy  No significant past surgical history      No Known Allergies      Meds:  heparin  Injectable 5000Unit(s) SubCutaneous every 8 hours  aspirin  chewable 81milliGRAM(s) Oral daily  acetaminophen   Tablet. 650milliGRAM(s) Oral every 6 hours PRN  pregabalin 50milliGRAM(s) Oral at bedtime  docusate sodium 100milliGRAM(s) Oral two times a day  insulin lispro (HumaLOG) corrective regimen sliding scale  SubCutaneous at bedtime  pantoprazole    Tablet 40milliGRAM(s) Oral before breakfast  simvastatin 20milliGRAM(s) Oral at bedtime  furosemide   Injectable 40milliGRAM(s) IV Push daily  tamsulosin 0.4milliGRAM(s) Oral at bedtime  acetaminophen  Suppository 650milliGRAM(s) Rectal every 6 hours PRN  metoprolol 12.5milliGRAM(s) Oral every 12 hours  piperacillin/tazobactam IVPB. 3.375Gram(s) IV Intermittent every 8 hours  ALBUTerol    0.083% 2.5milliGRAM(s) Nebulizer every 6 hours  insulin lispro (HumaLOG) corrective regimen sliding scale  SubCutaneous three times a day before meals  dextrose 5%. 1000milliLiter(s) IV Continuous <Continuous>  dextrose Gel 1Dose(s) Oral once PRN  dextrose 50% Injectable 12.5Gram(s) IV Push once  dextrose 50% Injectable 25Gram(s) IV Push once  dextrose 50% Injectable 25Gram(s) IV Push once  glucagon  Injectable 1milliGRAM(s) IntraMuscular once PRN      SOCIAL HISTORY:  Smoker: no  ETOH use:no  Ilicit Drug use:no    FAMILY HISTORY:  Family history of acute myocardial infarction  Family history of cerebrovascular accident (CVA) (Sibling)      VITALS:  Vital Signs Last 24 Hrs  T(C): 37.6, Max: 39.4 ( @ 02:15)  T(F): 99.6, Max: 103 ( @ 02:15)  HR: 90 (87 - 126)  BP: 99/67 (97/44 - 143/69)  BP(mean): --  RR: 17 (17 - 99)  SpO2: 98% (18% - 100%)    LABS/DIAGNOSTIC TESTS:                          12.0   19.8  )-----------( 310      ( 18 May 2017 04:12 )             33.4     WBC Count: 19.8 K/uL ( @ 04:12)  WBC Count: 14.2 K/uL ( @ 07:02)  WBC Count: 8.5 K/uL ( @ 07:03)          137  |  98  |  12  ----------------------------<  179<H>  3.8   |  30  |  0.86    Ca    9.1      18 May 2017 04:12  Phos  3.2       Mg     1.9         TPro  7.6  /  Alb  3.0<L>  /  TBili  1.1  /  DBili  x   /  AST  92<H>  /  ALT  54  /  AlkPhos  111        Urinalysis Basic - ( 18 May 2017 03:35 )    Color: Yellow / Appearance: Clear / S.010 / pH: x  Gluc: x / Ketone: Large  / Bili: Negative / Urobili: 4   Blood: x / Protein: 30 mg/dL / Nitrite: Negative   Leuk Esterase: Moderate / RBC: 2-5 /HPF / WBC 26-50 /HPF   Sq Epi: x / Non Sq Epi: Few / Bacteria: Many /HPF        LIVER FUNCTIONS - ( 18 May 2017 04:12 )  Alb: 3.0 g/dL / Pro: 7.6 g/dL / ALK PHOS: 111 U/L / ALT: 54 U/L DA / AST: 92 U/L / GGT: x                 LACTATE:Lactate, Blood: 2.1 mmol/L ( @ 03:35)      ABG - ABG - ( 18 May 2017 08:55 )  pH: 7.46  /  pCO2: 38    /  pO2: 120   / HCO3: 27    / Base Excess: 3.2   /  SaO2: 96                  CULTURES: pending          RADIOLOGY:EXAM:  CHEST-PORTABLE URGENT                            PROCEDURE DATE:  2017        INTERPRETATION:  PORTABLE CHEST X-RAY    HISTORY: Concern for pneumonia.. Fever 39C/103F, post-operative patient..    COMPARISON: 2017.    FINDINGS:  Study is limited by portable technique and patient rotation.  There is mild diffuse interstitial prominence. Left basilar subsegmental   atelectasis.  No pneumothorax or pleural effusion.   The cardiac silhouette is not accurately assessed by AP technique.    IMPRESSION:  Mild diffuse interstitial prominence. Left basilar subsegmental   atelectasis.      ROS  [  ] UNABLE TO ELICIT

## 2017-05-18 NOTE — PROGRESS NOTE ADULT - PROBLEM SELECTOR PLAN 1
1) continue reg diet  2) zosyn started for UTI  3)f/u lactate at noon  4)keep krueger  5) f/u med management re lasix, hold for now  5) f/u cardio consult

## 2017-05-18 NOTE — CHART NOTE - NSCHARTNOTEFT_GEN_A_CORE
Paged by RN because patient has low BP. Patient seen and examined at bedside. Chart Reviewed. Patient found to be lethargic, responding to verbal instructions, denied any chest pain, SOB, abdominal pain. Physical exam significant for bibasilar crackles, systolic murmur, mild RLQ tenderness. Rectal temperature 100.7, BP 80/40 (RUE), SpO2 99% on 3L. Patient appears to be breathing comfortably. Will give 500cc NS bolus and reassess BP after bolus finishes. Continue IV zosyn and tylenol prn for fever. Plan discussed with patient's son at bedside. Paged by RN because patient has low BP. Patient seen and examined at bedside. Chart Reviewed. Patient found to be lethargic, responding to verbal instructions, denied any chest pain, SOB, abdominal pain. Physical exam significant for bibasilar crackles, systolic murmur, mild RLQ tenderness. Rectal temperature 100.7, BP 80/40 (RUE), SpO2 99% on 3L. Patient appears to be breathing comfortably. Will give 500cc NS bolus and reassess BP after bolus finishes. Continue IV zosyn and tylenol prn for fever. Plan discussed with patient's son at bedside.    Addendum: 5/18/17 10:50PM  Lactate 2.8, repeat /42, . Will DC lasix for now and give normal saline at 50cc/hr for 6 hours and repeat lactate in AM. Plan discussed with patient's son at bedside.

## 2017-05-18 NOTE — CHART NOTE - NSCHARTNOTEFT_GEN_A_CORE
Paged regarding patient's vital signs, as patient was tachycardic to 120s. Blood pressure, pulse oximetry, respiratory rate stable. Patient is also noted to be more confused this morning, than yesterday evening.     I requested an EKG, rectal temperature, and an ABG. EKG shows sinus tachycardia, rectal temperature 39C/103F. ABG unable to be obtained, however the fever is the most likely source of the patient's tachycardia, with infection being the likely source of the patient's confusion (note, CTA chest ruled out a pulmonary embolism, indicates pleural effusions, and possible pneumonia). Note, no opioids, benzodiazepines, anti-psychotics, or zolpidem was administered to the patient.     UA is positive. Leukocytosis increased from 14 to 19,800, lactate 2.1. Manual differential, CMP and blood cultures testing. CXR pending. Urine culture now ordered, given fever, leukocytosis, and positive UA, with confusion. Patient has no cough, sputum productive at this time. No evidence of thrombophlebitis at present. Mild bibasilar crackles appreciated upon physical examination, however patient does not sound/appear significantly volume overloaded.    Given such a high fever, increasing leukocytosis, and prior episodes of hypotension during 17 Mai, 2017 (SBP 80s-90s), will start broad-spectrum antibiotics at this time, including Zosyn 3.3.75g IVPB q8h and vancomycin 1g IVPB x 1 dose.     Continue with incentive spirometry. No overt indication to suggest drug fever or other non-infectious etiologies of the fever, leukocytosis, with subsequent tachycardia/hypotension.     Decreasing metoprolol dose from 25mg po j12St to 12.5mg po j12St. Will hold morning dose of Lasix if CXR does not appear significantly volume overloaded.      Primary team to please follow up. Thank you! Paged regarding patient's vital signs, as patient was tachycardic to 120s. Blood pressure, pulse oximetry, respiratory rate stable. Patient is also noted to be more confused this morning, than yesterday evening.     I requested an EKG, rectal temperature, and an ABG. EKG shows sinus tachycardia, rectal temperature 39C/103F. ABG unable to be obtained, however the fever is the most likely source of the patient's tachycardia, with infection being the likely source of the patient's confusion (note, CTA chest ruled out a pulmonary embolism, indicates pleural effusions, and possible pneumonia). Note, no opioids, benzodiazepines, anti-psychotics, or zolpidem was administered to the patient.     UA is positive. Leukocytosis increased from 14 to 19,800, lactate 2.1. Manual differential, CMP and blood cultures testing. CXR pending. Urine culture now ordered, given fever, leukocytosis, and positive UA, with confusion. Patient has no cough, sputum productive at this time. No evidence of thrombophlebitis at present. Mild bibasilar crackles appreciated upon physical examination, however patient does not sound/appear significantly volume overloaded.    Given such a high fever, increasing leukocytosis, and prior episodes of hypotension during 17 Mai, 2017 (SBP 80s-90s), will start broad-spectrum antibiotics at this time, including Zosyn 3.3.75g IVPB q8h and vancomycin 1g IVPB x 1 dose.     Continue with incentive spirometry. No overt indication to suggest drug fever or other non-infectious etiologies of the fever, leukocytosis, with subsequent tachycardia/hypotension.     Decreasing metoprolol dose from 25mg po j12St to 12.5mg po j12St. Will hold morning dose of Lasix if CXR does not appear significantly volume overloaded.      Primary team to please follow up. Repeat lactate at 12:00 [facility]. Thank you! Paged regarding patient's vital signs, as patient was tachycardic to 120s. Blood pressure, pulse oximetry, respiratory rate stable. Patient is also noted to be more confused this morning, than yesterday evening. Of note, patient did not receive opioids, benzodiazepines, anti-psychotics, or zolpidem.     Physical Examination:    GEN: NAD.   HEENT: PERRLA. No significant erythaema, tenderness, drainage from surgical site.   CVS: S1, S2, tachycardia, regular. No murmurs.   PULM: Mild bibasilar crackles. No wheezing, dyspnoea, rhonchi. No acute respiratory distress.   ABD: NT/ND. Bowel sounds normoactive.   NEURO: Confused. Alert to person.   EXT: +1 pitting oedema of LE b/l.     Prior vitals, labouratory studies and imaging reviewed.     Assessment and Plan:   Patient is a 76YOF who is POD # __, s/p ______, who is tachycardic to HR 120s with episodes of hypotension.     1) Tachycardia:  - Likely secondary to fever, as rectal temperature is 39C/103F.   - No evidence of thrombophlebitis at present and no evidence to indicate significant non-infectious etiologies of the fever.   - Note, CTA chest ruled out a pulmonary embolism, indicates pleural effusions, and possible pneumonia.   - EKG shows sinus tachycardia.   - UA is positive. Leukocytosis increased from 14 to 19,800, lactate 2.1. Manual differential, CMP and blood cultures testing. CXR pending. Urine culture now ordered, given fever, leukocytosis, and positive UA, with confusion.   - Patient has no cough, sputum productive at this time.   - Given such a high fever, increasing leukocytosis, and prior episodes of hypotension during 17 Mai, 2017 (SBP 80s-90s), will start broad-spectrum antibiotics at this time, including Zosyn 3.3.75g IVPB j8St and vancomycin 1g IVPB x 1 dose.   - Decreasing metoprolol dose from 25mg po j12St to 12.5mg po j12St, as tachycardia is likely secondary to infection as opposed to an underlying cardiac etiology.    - Will hold morning dose of Lasix if CXR does not appear significantly volume overloaded.    - Continue with incentive spirometry.     Primary team to please follow up. Repeat lactate at 12:00 [facility]. Thank you! Paged regarding patient's vital signs, as patient was tachycardic to 120s. Blood pressure, pulse oximetry, respiratory rate stable. Patient is also noted to be more confused this morning, than yesterday evening. No shortness of breath at this time. Of note, patient did not receive opioids, benzodiazepines, Haldol, or zolpidem.     Physical Examination:    GEN: NAD.   HEENT: PERRLA. No significant erythaema, tenderness, drainage from surgical site.   CVS: S1, S2, tachycardia, regular. No murmurs.   PULM: Mild bibasilar crackles. No wheezing, dyspnoea, rhonchi. No acute respiratory distress.   ABD: NT/ND. Bowel sounds normoactive.   NEURO: Confused. Alert to person.   EXT: +1 pitting oedema of LE b/l.     Prior vital signs, labouratory studies and imaging reviewed.     Assessment and Plan:   Patient is a 76YOF from home who presented with a TIA and found to have 70% stenosis of the left carotid artery, who is POD # 1, s/p left carotid endarterectomy, presently tachycardic to HR 120s with episodes of hypotension. Note, patient was found to be volume overloaded yesterday, as well.     1) Tachycardia:  - Likely secondary to fever, as rectal temperature is 39C/103F.   - No evidence of thrombophlebitis at present and no evidence to indicate significant non-infectious etiologies of the fever.   - Note, CTA chest ruled out a pulmonary embolism, indicates pleural effusions, and possible pneumonia.   - EKG shows sinus tachycardia.   - UA is positive. Leukocytosis increased from 14 to 19,800, lactate 2.1. Manual differential, CMP and blood cultures testing. CXR pending. Urine culture now ordered, given fever, leukocytosis, and positive UA, with confusion.   - Patient has no cough, sputum productive at this time.   - Given such a high fever, increasing leukocytosis, and prior episodes of hypotension during 17 Mai, 2017 (SBP 80s-90s), will start broad-spectrum antibiotics at this time, including Zosyn 3.3.75g IVPB j8St and vancomycin 1g IVPB x 1 dose.   - Decreasing metoprolol dose from 25mg po j12St to 12.5mg po j12St, as tachycardia is likely secondary to infection as opposed to an underlying cardiac etiology.    - Will hold morning dose of Lasix if CXR does not appear significantly volume overloaded.    - Continue with incentive spirometry.     Primary team to please follow up. Repeat lactate at 12:00 [facility]. Thank you! Paged regarding patient's vital signs, as patient was tachycardic to 120s. Blood pressure, pulse oximetry, respiratory rate stable. Patient is also noted to be more confused this morning, than yesterday evening. No shortness of breath at this time. Of note, patient did not receive opioids, benzodiazepines, Haldol, or zolpidem.     Physical Examination:    GEN: NAD.   HEENT: PERRLA. No significant erythaema, tenderness, drainage from surgical site.   CVS: S1, S2, tachycardia, regular. No murmurs.   PULM: Mild bibasilar crackles. No wheezing, dyspnoea, rhonchi. No acute respiratory distress.   ABD: NT/ND. Bowel sounds normoactive.   NEURO: Confused. Alert to person.   EXT: +1 pitting oedema of LE b/l.     Prior vital signs, labouratory studies and imaging reviewed.     Assessment and Plan:   Patient is a 76YOF from home who presented with a TIA and found to have 70% stenosis of the left carotid artery, who is POD # 1, s/p left carotid endarterectomy, presently tachycardic to HR 120s with episodes of hypotension. Note, patient was found to be volume overloaded yesterday, as well.     1) Tachycardia:  - Likely secondary to fever, as rectal temperature is 39C/103F.   - No evidence of thrombophlebitis at present and no overt evidence to indicate non-infectious etiologies of the fever.   - Note, CTA chest ruled out a pulmonary embolism, indicates pleural effusions, and possible pneumonia.   - EKG shows sinus tachycardia.   - UA is positive. Leukocytosis increased from 14 to 19,800, lactate 2.1. Manual differential, CMP and blood cultures testing. CXR pending. Urine culture now ordered, given fever, leukocytosis, and positive UA, with confusion.   - Patient has no cough, sputum productive at this time.   - Given such a high fever, increasing leukocytosis, and prior episodes of hypotension during 17 Mai, 2017 (SBP 80s-90s), will start broad-spectrum antibiotics at this time, including Zosyn 3.3.75g IVPB j8St and vancomycin 1g IVPB x 1 dose.   - Decreasing metoprolol dose from 25mg po j12St to 12.5mg po j12St, as tachycardia is likely secondary to infection as opposed to an underlying cardiac etiology.    - Will hold morning dose of Lasix if CXR does not appear significantly volume overloaded.    - Continue with incentive spirometry.     Primary team to please follow up. Repeat lactate at 12:00 [facility]. Thank you! Paged regarding patient's vital signs, as patient was tachycardic to 120s. Blood pressure, pulse oximetry, respiratory rate stable. Patient is also noted to be more confused this morning, than yesterday evening. No shortness of breath at this time. Of note, patient did not receive opioids, benzodiazepines, Haldol, or zolpidem.     Physical Examination:  Temperature 39.4C/103F,  bpm, /66 mmHg, RR 20, SpO2 100% on 2 litre NC.   GEN: NAD.   HEENT: PERRLA. No significant erythaema, tenderness, drainage from surgical site.   CVS: S1, S2, tachycardia, regular. No murmurs.   PULM: Mild bibasilar crackles. No wheezing, dyspnoea, rhonchi. No acute respiratory distress.   ABD: NT/ND. Bowel sounds normoactive.   NEURO: Confused. Alert to person.   EXT: +1 pitting oedema of LE b/l.     Prior vital signs, labouratory studies and imaging reviewed.     Assessment and Plan:   Patient is a 76YOF from home who presented with a TIA and found to have 70% stenosis of the left carotid artery, who is POD # 1, s/p left carotid endarterectomy, presently tachycardic to HR 120s with episodes of hypotension. Note, patient was found to be volume overloaded yesterday, as well.     1) Tachycardia:  - Likely secondary to fever, as rectal temperature is 39C/103F.   - No evidence of thrombophlebitis at present and no overt evidence to indicate non-infectious etiologies of the fever.   - Note, CTA chest ruled out a pulmonary embolism, indicates pleural effusions, and possible pneumonia.   - EKG shows sinus tachycardia.   - UA is positive. Leukocytosis increased from 14 to 19,800, lactate 2.1. Manual differential, CMP and blood cultures testing. CXR pending. Urine culture now ordered, given fever, leukocytosis, and positive UA, with confusion.   - Patient has no cough, sputum productive at this time.   - Given such a high fever, increasing leukocytosis, and prior episodes of hypotension during 17 Mai, 2017 (SBP 80s-90s), will start broad-spectrum antibiotics at this time, including Zosyn 3.3.75g IVPB j8St and vancomycin 1g IVPB x 1 dose.   - Decreasing metoprolol dose from 25mg po j12St to 12.5mg po j12St, as tachycardia is likely secondary to infection as opposed to an underlying cardiac etiology.    - Will hold morning dose of Lasix if CXR does not appear significantly volume overloaded.    - Continue with incentive spirometry.     Primary team to please follow up. Repeat lactate at 12:00 [facility]. Thank you! Paged regarding patient's vital signs, as patient was tachycardic to 120s. Blood pressure, pulse oximetry, respiratory rate stable. Patient is also noted to be more confused this morning, than yesterday evening. No shortness of breath at this time. Of note, patient did not receive opioids, benzodiazepines, Haldol, or zolpidem.     Physical Examination:  Temperature 39.4C/103F,  bpm, /66 mmHg, RR 20, SpO2 100% on 2 litre NC.   GEN: NAD.   HEENT: PERRLA. No significant erythaema, tenderness, drainage from surgical site.   CVS: S1, S2, tachycardia, regular. No murmurs.   PULM: Mild bibasilar crackles. No wheezing, dyspnoea, rhonchi. No acute respiratory distress.   ABD: NT/ND. Bowel sounds normoactive.   NEURO: Confused. Alert to person.   EXT: +1 pitting oedema of LE b/l.     Prior vital signs, labouratory studies and imaging reviewed.     Assessment and Plan:   Patient is a 76YOF from home who presented with a TIA and found to have 70% stenosis of the left carotid artery, who is POD # 1, s/p left carotid endarterectomy, presently tachycardic to HR 120s with episodes of hypotension. Note, patient was found to be volume overloaded yesterday, as well.     1) Tachycardia:  - Likely secondary to fever, as rectal temperature is 39C/103F.   - No evidence of thrombophlebitis at present and no overt evidence to indicate non-infectious etiologies of the fever.   - Note, CTA chest ruled out a pulmonary embolism, indicates pleural effusions, and possible pneumonia.   - EKG shows sinus tachycardia.   - UA is positive. Leukocytosis increased from 14 to 19,800, lactate 2.1. Manual differential, CMP and blood cultures testing. CXR pending. Urine culture now ordered, given fever, leukocytosis, and positive UA, with confusion.   - Patient has no cough, sputum productive at this time.   - Given such a high fever, increasing leukocytosis, and prior episodes of hypotension during 17 Mai, 2017 (SBP 80s-90s), will start broad-spectrum antibiotics at this time, including Zosyn 3.3.75g IVPB j8St and vancomycin 1g IVPB x 1 dose.   - Decreasing metoprolol dose from 25mg po j12St to 12.5mg po j12St, as tachycardia is likely secondary to infection as opposed to an underlying cardiac etiology.    - Will hold morning dose of Lasix if CXR does not appear significantly volume overloaded. Administer IVF as is appropriate.   - Continue with incentive spirometry.     Primary team to please follow up. Repeat lactate at 12:00 r. Thank you!

## 2017-05-18 NOTE — PROGRESS NOTE ADULT - SUBJECTIVE AND OBJECTIVE BOX
s/p Left CEA POD # 3  Pt seen at bedside, appears lethargic, in no distress at this time, denies pain, tolerating diet, No changes in vision or slurred speech  Pt was seen this morning by medicine in regards to tachycardia. Pt was found to have uti.    ICU Vital Signs Last 24 Hrs  T(C): 37.1, Max: 39.4 (05-18 @ 02:15)  T(F): 98.8, Max: 103 (05-18 @ 02:15)  HR: 105 (87 - 126)  BP: 110/54 (97/45 - 143/69)  BP(mean): --  ABP: --  ABP(mean): --  RR: 99 (17 - 99)  SpO2: 18% (18% - 100%)    krueger in place:2100 clear urine    Neck: soft, supple, no JVD, incision site is clean, steri strips intact and dry, no bleeding                          12.0   19.8  )-----------( 310      ( 18 May 2017 04:12 )             33.4   05-18    137  |  98  |  12  ----------------------------<  179<H>  3.8   |  30  |  0.86    Ca    9.1      18 May 2017 04:12  Phos  3.2     05-17  Mg     1.9     05-17  lactate 2.1    05-18    TPro  7.6  /  Alb  3.0<L>  /  TBili  1.1  /  DBili  x   /  AST  92<H>  /  ALT  54  /  AlkPhos  111  05-18

## 2017-05-18 NOTE — PROGRESS NOTE ADULT - SUBJECTIVE AND OBJECTIVE BOX
Patient is a 76y old  Female who was brought by son for sudden onset of tongue heaviness, slurred speech since 9:00 PM (12 May 2017 08:43)      INTERVAL HPI/OVERNIGHT EVENTS:    Neurologic symptoms resolved, and she underwent a left carotid endarterectomy for a previously known 70% occlusion.  After surgery she was followed in ICU and transferred to surgical floor.  Yesterday, early AM, she developed SOB.  Exam and CXR were positive for fluid overload.  CT angio also showed congestion and was negative for PE.  She was diuresed and felt better.  She also had urinary retention.  A Jj was placed, and re-placed because she was unable to void.  Last evening she developed tachycardia (sinus).   Early this AM she was found to have fever.  After cultures, she was treated empirically for UTI.     MEDICATIONS  (STANDING):  heparin  Injectable 5000Unit(s) SubCutaneous every 8 hours  aspirin  chewable 81milliGRAM(s) Oral daily  pregabalin 50milliGRAM(s) Oral at bedtime  docusate sodium 100milliGRAM(s) Oral two times a day  insulin lispro (HumaLOG) corrective regimen sliding scale  SubCutaneous at bedtime  pantoprazole    Tablet 40milliGRAM(s) Oral before breakfast  simvastatin 20milliGRAM(s) Oral at bedtime  furosemide   Injectable 40milliGRAM(s) IV Push daily  tamsulosin 0.4milliGRAM(s) Oral at bedtime  metoprolol 12.5milliGRAM(s) Oral every 12 hours  piperacillin/tazobactam IVPB. 3.375Gram(s) IV Intermittent every 8 hours  ALBUTerol    0.083% 2.5milliGRAM(s) Nebulizer every 6 hours  insulin lispro (HumaLOG) corrective regimen sliding scale  SubCutaneous three times a day before meals  dextrose 5%. 1000milliLiter(s) IV Continuous <Continuous>  dextrose 50% Injectable 12.5Gram(s) IV Push once  dextrose 50% Injectable 25Gram(s) IV Push once  dextrose 50% Injectable 25Gram(s) IV Push once    MEDICATIONS  (PRN):  acetaminophen   Tablet. 650milliGRAM(s) Oral every 6 hours PRN Mild Pain (1 - 3)  acetaminophen  Suppository 650milliGRAM(s) Rectal every 6 hours PRN For Temp greater than 38 C (100.4 F)  dextrose Gel 1Dose(s) Oral once PRN Blood Glucose LESS THAN 70 milliGRAM(s)/deciliter  glucagon  Injectable 1milliGRAM(s) IntraMuscular once PRN Glucose LESS THAN 70 milligrams/deciliter    Allergies  No Known Allergies    Intolerances:  high dose statins    Somnolent 75 yo woman.    Vital Signs Last 24 Hrs  T(C): 37.6, Max: 39.4 ( @ 02:15)  T(F): 99.6, Max: 103 ( @ 02:15)  HR: 90 (87 - 126)  BP: 97/44 (97/44 - 143/69)  BP(mean): --  RR: 17 (17 - 99)  SpO2: 98% (18% - 100%)      SKIN: Left CEA scar is healing.  No surrounding erythema or purulence.   CHEST/LUNG: Clear to percussion bilaterally; No rales, rhonchi, wheezing, or rubs  HEART: Regular rate and rhythm; III/VI systolic murmur.  No justen.   ABDOMEN: Soft, Nontender, Nondistended; Bowel sounds present  EXTREMITIES:  2+ Peripheral Pulses, No clubbing, cyanosis, or edema  Jj in place.  Urinalysis with microscopic can be found, below.  Urine is grossly clear, not turbid.       LABS:                        12.0   19.8  )-----------( 310      ( 18 May 2017 04:12 )             33.4         137  |  98  |  12  ----------------------------<  179<H>  3.8   |  30  |  0.86    Ca    9.1      18 May 2017 04:12  Phos  3.2       Mg     1.9         TPro  7.6  /  Alb  3.0<L>  /  TBili  1.1  /  DBili  x   /  AST  92<H>  /  ALT  54  /  AlkPhos  111        Urinalysis Basic - ( 18 May 2017 03:35 )    Color: Yellow / Appearance: Clear / S.010 / pH: x  Gluc: x / Ketone: Large  / Bili: Negative / Urobili: 4   Blood: x / Protein: 30 mg/dL / Nitrite: Negative   Leuk Esterase: Moderate / RBC: 2-5 /HPF / WBC 26-50 /HPF   Sq Epi: x / Non Sq Epi: Few / Bacteria: Many /HPF      CAPILLARY BLOOD GLUCOSE  302 (18 May 2017 11:45)  312 (18 May 2017 08:22)  232 (18 May 2017 02:35)  211 (17 May 2017 21:30)  230 (17 May 2017 16:03)    CXR:    HISTORY: Concern for pneumonia.. Fever 39C/103F, post-operative patient..    COMPARISON: 2017.    FINDINGS:  Study is limited by portable technique and patient rotation.  There is mild diffuse interstitial prominence. Left basilar subsegmental   atelectasis.  No pneumothorax or pleural effusion.   The cardiac silhouette is not accurately assessed by AP technique.    IMPRESSION:  Mild diffuse interstitial prominence. Left basilar subsegmental   atelectasis.    IMP:  S/P TIA.  No residual focal deficit          S/P carotid endarterectomy, incision is healed          Urinary retention          Somnolence is likely due to fever          Fever is likely due to acute infection.  Microscopic urine is positive and this may be a source of fever.  Atalectasis is also present. r/o endovascular or line infection.       Consultant(s) Notes Reviewed:  [x ] YES  [ ] NO    Care Discussed with Consultants/Other Providers [x ] YES  [ ] NO    Agree with work-up and use of empirical antibiotics.   Suggest ID consultation.

## 2017-05-18 NOTE — PROGRESS NOTE ADULT - SUBJECTIVE AND OBJECTIVE BOX
INTERVAL HPI/OVERNIGHT EVENTS:  s/p left CEA, pt. is weak      Past medical history and review of systems reviewed and updated as appropriate.    Vital Signs Last 24 Hrs  T(C): 37.3, Max: 39.4 (05-18 @ 02:15)  T(F): 99.1, Max: 103 (05-18 @ 02:15)  HR: 95 (87 - 126)  BP: 115/52 (97/45 - 143/69)  BP(mean): --  RR: 18 (17 - 99)  SpO2: 100% (18% - 100%)    PHYSICAL EXAM:    Incision: c/d/i    Neuro:  Motor Upper: grossly intact  Motor Lower:  grossly intact      LABS:                        12.0   19.8  )-----------( 310      ( 18 May 2017 04:12 )             33.4     05-18    137  |  98  |  12  ----------------------------<  179<H>  3.8   |  30  |  0.86    Ca    9.1      18 May 2017 04:12  Phos  3.2     05-17  Mg     1.9     05-17    TPro  7.6  /  Alb  3.0<L>  /  TBili  1.1  /  DBili  x   /  AST  92<H>  /  ALT  54  /  AlkPhos  111  05-18        MEDICATIONS:  heparin  Injectable 5000Unit(s) SubCutaneous every 8 hours  aspirin  chewable 81milliGRAM(s) Oral daily  piperacillin/tazobactam IVPB. 3.375Gram(s) IV Intermittent every 8 hours      RADIOLOGY & ADDITIONAL TESTS:

## 2017-05-18 NOTE — PROGRESS NOTE ADULT - SUBJECTIVE AND OBJECTIVE BOX
Patient seen and examined.   Time of visit:13:52    MEDICATIONS  (STANDING):  heparin  Injectable 5000Unit(s) SubCutaneous every 8 hours  aspirin  chewable 81milliGRAM(s) Oral daily  pregabalin 50milliGRAM(s) Oral at bedtime  docusate sodium 100milliGRAM(s) Oral two times a day  insulin lispro (HumaLOG) corrective regimen sliding scale  SubCutaneous at bedtime  pantoprazole    Tablet 40milliGRAM(s) Oral before breakfast  simvastatin 20milliGRAM(s) Oral at bedtime  furosemide   Injectable 40milliGRAM(s) IV Push daily  tamsulosin 0.4milliGRAM(s) Oral at bedtime  metoprolol 12.5milliGRAM(s) Oral every 12 hours  piperacillin/tazobactam IVPB. 3.375Gram(s) IV Intermittent every 8 hours  ALBUTerol    0.083% 2.5milliGRAM(s) Nebulizer every 6 hours  insulin lispro (HumaLOG) corrective regimen sliding scale  SubCutaneous three times a day before meals  dextrose 5%. 1000milliLiter(s) IV Continuous <Continuous>  dextrose 50% Injectable 12.5Gram(s) IV Push once  dextrose 50% Injectable 25Gram(s) IV Push once  dextrose 50% Injectable 25Gram(s) IV Push once      MEDICATIONS  (PRN):  acetaminophen   Tablet. 650milliGRAM(s) Oral every 6 hours PRN Mild Pain (1 - 3)  acetaminophen  Suppository 650milliGRAM(s) Rectal every 6 hours PRN For Temp greater than 38 C (100.4 F)  dextrose Gel 1Dose(s) Oral once PRN Blood Glucose LESS THAN 70 milliGRAM(s)/deciliter  glucagon  Injectable 1milliGRAM(s) IntraMuscular once PRN Glucose LESS THAN 70 milligrams/deciliter   Medications up to date at time of exam.      PHYSICAL EXAMINATION:  Patient has no new complaints.  GENERAL: The patient is a well-developed, well-nourished, in no apparent distress.     Vital Signs Last 24 Hrs  T(C): 37.3, Max: 39.4 (05-18 @ 02:15)  T(F): 99.1, Max: 103 (05-18 @ 02:15)  HR: 102 (87 - 126)  BP: 120/55 (97/45 - 143/69)  BP(mean): --  RR: 18 (17 - 99)  SpO2: 100% (18% - 100%)   (if applicable)      HEENT: Head is normocephalic and atraumatic. Extraocular muscles are intact. Mucous membranes are moist.     NECK: Supple, no palpable adenopathy.    LUNGS: Clear to auscultation, no wheezing, rales, or rhonchi.    HEART: Regular rate and rhythm without murmur.    ABDOMEN: Soft, nontender, and nondistended.  No hepatosplenomegaly is noted.    EXTREMITIES: Without any cyanosis, clubbing, rash, lesions or edema.    NEUROLOGIC: Awake, alert, oriented.     SKIN: Warm, dry, good turgor.      LABS:                        12.0   19.8  )-----------( 310      ( 18 May 2017 04:12 )             33.4     -18    137  |  98  |  12  ----------------------------<  179<H>  3.8   |  30  |  0.86    Ca    9.1      18 May 2017 04:12  Phos  3.2     05-  Mg     1.9         TPro  7.6  /  Alb  3.0<L>  /  TBili  1.1  /  DBili  x   /  AST  92<H>  /  ALT  54  /  AlkPhos  111  18      Urinalysis Basic - ( 18 May 2017 03:35 )    Color: Yellow / Appearance: Clear / S.010 / pH: x  Gluc: x / Ketone: Large  / Bili: Negative / Urobili: 4   Blood: x / Protein: 30 mg/dL / Nitrite: Negative   Leuk Esterase: Moderate / RBC: 2-5 /HPF / WBC 26-50 /HPF   Sq Epi: x / Non Sq Epi: Few / Bacteria: Many /HPF      ABG - ( 18 May 2017 08:55 )  pH: 7.46  /  pCO2: 38    /  pO2: 120   / HCO3: 27    / Base Excess: 3.2   /  SaO2: 96                        Lactate, Blood: 2.1 mmol/L ( @ 03:35)        MICROBIOLOGY: (if applicable)    RADIOLOGY & ADDITIONAL STUDIES:  EKG:   CXR:  ECHO:    IMPRESSION: 76y Female PAST MEDICAL & SURGICAL HISTORY:  Type 2 diabetes mellitus  Essential hypertension  DM (diabetes mellitus)  HTN (hypertension)  Fibromyalgia  History of appendectomy  No significant past surgical history   p/w     RECOMMENDATIONS:  1. Blood pressure with controlled at this time continue with present medication.  2. HR is better controlled at this time  3. Continue with present medications.  4.Outpatient follow up in my office.

## 2017-05-18 NOTE — CONSULT NOTE ADULT - ASSESSMENT
fever  leukocytosis - increasing  ? uti  plan - cont zosyn 3.375 gms iv q8 hrs emperically for now  await cult results. fever  leukocytosis - increasing  ? uti  abdominal pain and tenderness - consider ct abdomen if no source of fever found  plan - cont zosyn 3.375 gms iv q8 hrs emperically for now  await cult results.

## 2017-05-19 DIAGNOSIS — R14.0 ABDOMINAL DISTENSION (GASEOUS): ICD-10-CM

## 2017-05-19 DIAGNOSIS — A41.9 SEPSIS, UNSPECIFIED ORGANISM: ICD-10-CM

## 2017-05-19 DIAGNOSIS — Z29.9 ENCOUNTER FOR PROPHYLACTIC MEASURES, UNSPECIFIED: ICD-10-CM

## 2017-05-19 LAB
ANION GAP SERPL CALC-SCNC: 8 MMOL/L — SIGNIFICANT CHANGE UP (ref 5–17)
BASOPHILS # BLD AUTO: 0 K/UL — SIGNIFICANT CHANGE UP (ref 0–0.2)
BASOPHILS NFR BLD AUTO: 0.2 % — SIGNIFICANT CHANGE UP (ref 0–2)
BUN SERPL-MCNC: 24 MG/DL — HIGH (ref 7–18)
CALCIUM SERPL-MCNC: 8.7 MG/DL — SIGNIFICANT CHANGE UP (ref 8.4–10.5)
CHLORIDE SERPL-SCNC: 100 MMOL/L — SIGNIFICANT CHANGE UP (ref 96–108)
CO2 SERPL-SCNC: 29 MMOL/L — SIGNIFICANT CHANGE UP (ref 22–31)
CREAT SERPL-MCNC: 1.12 MG/DL — SIGNIFICANT CHANGE UP (ref 0.5–1.3)
EOSINOPHIL # BLD AUTO: 0 K/UL — SIGNIFICANT CHANGE UP (ref 0–0.5)
EOSINOPHIL NFR BLD AUTO: 0.2 % — SIGNIFICANT CHANGE UP (ref 0–6)
GLUCOSE SERPL-MCNC: 221 MG/DL — HIGH (ref 70–99)
HCT VFR BLD CALC: 23.6 % — LOW (ref 34.5–45)
HCT VFR BLD CALC: 27.6 % — LOW (ref 34.5–45)
HGB BLD-MCNC: 8.9 G/DL — LOW (ref 11.5–15.5)
HGB BLD-MCNC: 9.5 G/DL — LOW (ref 11.5–15.5)
LACTATE SERPL-SCNC: 1.3 MMOL/L — SIGNIFICANT CHANGE UP (ref 0.7–2)
LYMPHOCYTES # BLD AUTO: 1.1 K/UL — SIGNIFICANT CHANGE UP (ref 1–3.3)
LYMPHOCYTES # BLD AUTO: 7.2 % — LOW (ref 13–44)
MAGNESIUM SERPL-MCNC: 2.4 MG/DL — SIGNIFICANT CHANGE UP (ref 1.6–2.6)
MCHC RBC-ENTMCNC: 29 PG — SIGNIFICANT CHANGE UP (ref 27–34)
MCHC RBC-ENTMCNC: 31.1 PG — SIGNIFICANT CHANGE UP (ref 27–34)
MCHC RBC-ENTMCNC: 34.3 GM/DL — SIGNIFICANT CHANGE UP (ref 32–36)
MCHC RBC-ENTMCNC: 37.5 GM/DL — HIGH (ref 32–36)
MCV RBC AUTO: 82.8 FL — SIGNIFICANT CHANGE UP (ref 80–100)
MCV RBC AUTO: 84.6 FL — SIGNIFICANT CHANGE UP (ref 80–100)
MONOCYTES # BLD AUTO: 1 K/UL — HIGH (ref 0–0.9)
MONOCYTES NFR BLD AUTO: 6.5 % — SIGNIFICANT CHANGE UP (ref 2–14)
NEUTROPHILS # BLD AUTO: 12.9 K/UL — HIGH (ref 1.8–7.4)
NEUTROPHILS NFR BLD AUTO: 85.9 % — HIGH (ref 43–77)
PHOSPHATE SERPL-MCNC: 3.4 MG/DL — SIGNIFICANT CHANGE UP (ref 2.5–4.5)
PLATELET # BLD AUTO: 254 K/UL — SIGNIFICANT CHANGE UP (ref 150–400)
PLATELET # BLD AUTO: 283 K/UL — SIGNIFICANT CHANGE UP (ref 150–400)
POTASSIUM SERPL-MCNC: 3.6 MMOL/L — SIGNIFICANT CHANGE UP (ref 3.5–5.3)
POTASSIUM SERPL-SCNC: 3.6 MMOL/L — SIGNIFICANT CHANGE UP (ref 3.5–5.3)
RBC # BLD: 2.85 M/UL — LOW (ref 3.8–5.2)
RBC # BLD: 3.26 M/UL — LOW (ref 3.8–5.2)
RBC # FLD: 14 % — SIGNIFICANT CHANGE UP (ref 10.3–14.5)
RBC # FLD: 14.5 % — SIGNIFICANT CHANGE UP (ref 10.3–14.5)
SODIUM SERPL-SCNC: 137 MMOL/L — SIGNIFICANT CHANGE UP (ref 135–145)
WBC # BLD: 15 K/UL — HIGH (ref 3.8–10.5)
WBC # BLD: 15.9 K/UL — HIGH (ref 3.8–10.5)
WBC # FLD AUTO: 15 K/UL — HIGH (ref 3.8–10.5)
WBC # FLD AUTO: 15.9 K/UL — HIGH (ref 3.8–10.5)

## 2017-05-19 PROCEDURE — 71010: CPT | Mod: 26

## 2017-05-19 PROCEDURE — 99232 SBSQ HOSP IP/OBS MODERATE 35: CPT

## 2017-05-19 PROCEDURE — 74000: CPT | Mod: 26

## 2017-05-19 RX ORDER — SODIUM CHLORIDE 9 MG/ML
1000 INJECTION INTRAMUSCULAR; INTRAVENOUS; SUBCUTANEOUS
Qty: 0 | Refills: 0 | Status: DISCONTINUED | OUTPATIENT
Start: 2017-05-19 | End: 2017-05-24

## 2017-05-19 RX ORDER — ACETAMINOPHEN 500 MG
650 TABLET ORAL EVERY 6 HOURS
Qty: 0 | Refills: 0 | Status: DISCONTINUED | OUTPATIENT
Start: 2017-05-19 | End: 2017-05-24

## 2017-05-19 RX ORDER — SODIUM CHLORIDE 9 MG/ML
500 INJECTION INTRAMUSCULAR; INTRAVENOUS; SUBCUTANEOUS ONCE
Qty: 0 | Refills: 0 | Status: DISCONTINUED | OUTPATIENT
Start: 2017-05-19 | End: 2017-05-24

## 2017-05-19 RX ADMIN — SIMVASTATIN 20 MILLIGRAM(S): 20 TABLET, FILM COATED ORAL at 21:05

## 2017-05-19 RX ADMIN — ALBUTEROL 2.5 MILLIGRAM(S): 90 AEROSOL, METERED ORAL at 20:55

## 2017-05-19 RX ADMIN — Medication 650 MILLIGRAM(S): at 14:05

## 2017-05-19 RX ADMIN — HEPARIN SODIUM 5000 UNIT(S): 5000 INJECTION INTRAVENOUS; SUBCUTANEOUS at 05:42

## 2017-05-19 RX ADMIN — HEPARIN SODIUM 5000 UNIT(S): 5000 INJECTION INTRAVENOUS; SUBCUTANEOUS at 21:07

## 2017-05-19 RX ADMIN — ALBUTEROL 2.5 MILLIGRAM(S): 90 AEROSOL, METERED ORAL at 14:51

## 2017-05-19 RX ADMIN — PIPERACILLIN AND TAZOBACTAM 25 GRAM(S): 4; .5 INJECTION, POWDER, LYOPHILIZED, FOR SOLUTION INTRAVENOUS at 21:05

## 2017-05-19 RX ADMIN — Medication 4: at 11:45

## 2017-05-19 RX ADMIN — SODIUM CHLORIDE 75 MILLILITER(S): 9 INJECTION INTRAMUSCULAR; INTRAVENOUS; SUBCUTANEOUS at 17:35

## 2017-05-19 RX ADMIN — HEPARIN SODIUM 5000 UNIT(S): 5000 INJECTION INTRAVENOUS; SUBCUTANEOUS at 14:05

## 2017-05-19 RX ADMIN — Medication 100 MILLIGRAM(S): at 18:10

## 2017-05-19 RX ADMIN — Medication 81 MILLIGRAM(S): at 12:53

## 2017-05-19 RX ADMIN — Medication 650 MILLIGRAM(S): at 22:18

## 2017-05-19 RX ADMIN — PIPERACILLIN AND TAZOBACTAM 25 GRAM(S): 4; .5 INJECTION, POWDER, LYOPHILIZED, FOR SOLUTION INTRAVENOUS at 05:42

## 2017-05-19 RX ADMIN — Medication 650 MILLIGRAM(S): at 21:05

## 2017-05-19 RX ADMIN — Medication 650 MILLIGRAM(S): at 13:03

## 2017-05-19 RX ADMIN — Medication 6: at 18:10

## 2017-05-19 RX ADMIN — Medication 6: at 08:41

## 2017-05-19 RX ADMIN — PIPERACILLIN AND TAZOBACTAM 25 GRAM(S): 4; .5 INJECTION, POWDER, LYOPHILIZED, FOR SOLUTION INTRAVENOUS at 14:05

## 2017-05-19 RX ADMIN — ALBUTEROL 2.5 MILLIGRAM(S): 90 AEROSOL, METERED ORAL at 08:53

## 2017-05-19 RX ADMIN — TAMSULOSIN HYDROCHLORIDE 0.4 MILLIGRAM(S): 0.4 CAPSULE ORAL at 21:05

## 2017-05-19 RX ADMIN — Medication 50 MILLIGRAM(S): at 21:05

## 2017-05-19 NOTE — PROGRESS NOTE ADULT - SUBJECTIVE AND OBJECTIVE BOX
INTERVAL HPI/OVERNIGHT EVENTS:  76 year old lady from home, walks with assistance, , PMH of CVA ( Right frontal MCA , s/p TPA in ), small SAH ( acute in Right frontal region, 2017)  Fibromyalgia,  HTN and  DM2 had left carotid artery endarterectomy on 5/15/2017. She was in ICU for post op monitoring and downgraded to surgical floor.   On the floor, she had spiking fever and leukocytosis. T max is 100.7 in last 24 hour. SBP went down to 78 mmHg once last night and she received only 500 ml of bolus. Her BP has been stable  mmHg since then. WBC went down to 15 from 19. UA shows WBC. Abx was started on 2017.      PRESSORS: [ ] YES [x ] NO  WHICH:        Antimicrobial:  piperacillin/tazobactam IVPB. 3.375Gram(s) IV Intermittent every 8 hours starting on 2017.     Cardiovascular:  tamsulosin 0.4milliGRAM(s) Oral at bedtime  metoprolol 12.5milliGRAM(s) Oral every 12 hours    Pulmonary:  ALBUTerol    0.083% 2.5milliGRAM(s) Nebulizer every 6 hours    Hematalogic:  heparin  Injectable 5000Unit(s) SubCutaneous every 8 hours  aspirin  chewable 81milliGRAM(s) Oral daily    Other:  acetaminophen   Tablet. 650milliGRAM(s) Oral every 6 hours PRN  pregabalin 50milliGRAM(s) Oral at bedtime  docusate sodium 100milliGRAM(s) Oral two times a day  insulin lispro (HumaLOG) corrective regimen sliding scale  SubCutaneous at bedtime  pantoprazole    Tablet 40milliGRAM(s) Oral before breakfast  simvastatin 20milliGRAM(s) Oral at bedtime  acetaminophen  Suppository 650milliGRAM(s) Rectal every 6 hours PRN  insulin lispro (HumaLOG) corrective regimen sliding scale  SubCutaneous three times a day before meals  dextrose 5%. 1000milliLiter(s) IV Continuous <Continuous>  sodium chloride 0.9%. 1000milliLiter(s) IV Continuous <Continuous>    heparin  Injectable 5000Unit(s) SubCutaneous every 8 hours  aspirin  chewable 81milliGRAM(s) Oral daily  acetaminophen   Tablet. 650milliGRAM(s) Oral every 6 hours PRN  pregabalin 50milliGRAM(s) Oral at bedtime  docusate sodium 100milliGRAM(s) Oral two times a day  insulin lispro (HumaLOG) corrective regimen sliding scale  SubCutaneous at bedtime  pantoprazole    Tablet 40milliGRAM(s) Oral before breakfast  simvastatin 20milliGRAM(s) Oral at bedtime  tamsulosin 0.4milliGRAM(s) Oral at bedtime  acetaminophen  Suppository 650milliGRAM(s) Rectal every 6 hours PRN  metoprolol 12.5milliGRAM(s) Oral every 12 hours  piperacillin/tazobactam IVPB. 3.375Gram(s) IV Intermittent every 8 hours  ALBUTerol    0.083% 2.5milliGRAM(s) Nebulizer every 6 hours  insulin lispro (HumaLOG) corrective regimen sliding scale  SubCutaneous three times a day before meals  dextrose 5%. 1000milliLiter(s) IV Continuous <Continuous>  sodium chloride 0.9%. 1000milliLiter(s) IV Continuous <Continuous>    Drug Dosing Weight  Height (cm): 147.3 (15 May 2017 07:08)  Weight (kg): 71 (15 May 2017 16:00)  BMI (kg/m2): 32.7 (15 May 2017 16:00)  BSA (m2): 1.64 (15 May 2017 16:00)    CENTRAL LINE: [ ] YES [x ] NO  LOCATION:   DATE INSERTED:  REMOVE: [ ] YES [ ] NO  EXPLAIN:    PRYOR: [ x] YES [ ] NO    DATE INSERTED:  REMOVE:  [ ] YES [x ] NO  EXPLAIN: I/O monitoring    A-LINE:  [ ] YES [ x] NO  LOCATION:   DATE INSERTED:  REMOVE:  [ ] YES [ ] NO  EXPLAIN:    PMH -reviewed admission note, no change since admission  PAST MEDICAL & SURGICAL HISTORY:  Type 2 diabetes mellitus  Essential hypertension  DM (diabetes mellitus)  HTN (hypertension)  Fibromyalgia  History of appendectomy  No significant past surgical history      ICU Vital Signs Last 24 Hrs  T(C): 36.6, Max: 38.2 (05-18 @ 21:30)  T(F): 97.8, Max: 100.7 (05-18 @ 21:30)  HR: 87 (87 - 104)  BP: 101/43 (78/38 - 125/65)  BP(mean): --  ABP: --  ABP(mean): --  RR: 16 (16 - 20)  SpO2: 99% (98% - 100%)      ABG - ( 18 May 2017 08:55 )  pH: 7.46  /  pCO2: 38    /  pO2: 120   / HCO3: 27    / Base Excess: 3.2   /  SaO2: 96                PHYSICAL EXAM:    GENERAL: [ ]NAD, [ ]well-groomed, [ ]well-developed  HEAD:  [ ]Atraumatic, [ ]Normocephalic  EYES: [ ]EOMI, [ ]PERRLA, [ ]conjunctiva and sclera clear  ENMT: [ ]No tonsillar erythema, exudates, or enlargement; [ ]Moist mucous membranes, [ ]Good dentition, [ ]No lesions  NECK: [ ]Supple, normal appearance, [ ]No JVD; [ ]Normal thyroid; [ ]Trachea midline  NERVOUS SYSTEM:  [ ]Alert & Oriented X3, [ ]Good concentration; [ ]Motor Strength 5/5 B/L upper and lower extremities; [ ]DTRs 2+ intact and symmetric  CHEST/LUNG: [ ]No chest deformity; [ ]Normal percussion bilaterally; [ ]No rales, rhonchi, wheezing   HEART: [ ]Regular rate and rhythm; [ ]No murmurs, rubs, or gallops  ABDOMEN: [ ]Soft, Nontender, Nondistended; [ ]Bowel sounds present  EXTREMITIES:  [ ]2+ Peripheral Pulses, [ ]No clubbing, cyanosis, or edema  LYMPH: [ ]No lymphadenopathy noted  SKIN: [ ]No rashes or lesions; [ ]Good capillary refill      LABS:  CBC Full  -  ( 19 May 2017 07:08 )  WBC Count : 15.0 K/uL  Hemoglobin : 8.9 g/dL  Hematocrit : 23.6 %  Platelet Count - Automated : 254 K/uL  Mean Cell Volume : 82.8 fl  Mean Cell Hemoglobin : 31.1 pg  Mean Cell Hemoglobin Concentration : 37.5 gm/dL  Auto Neutrophil # : 12.9 K/uL  Auto Lymphocyte # : 1.1 K/uL  Auto Monocyte # : 1.0 K/uL  Auto Eosinophil # : 0.0 K/uL  Auto Basophil # : 0.0 K/uL  Auto Neutrophil % : 85.9 %  Auto Lymphocyte % : 7.2 %  Auto Monocyte % : 6.5 %  Auto Eosinophil % : 0.2 %  Auto Basophil % : 0.2 %    05-19    137  |  100  |  24<H>  ----------------------------<  221<H>  3.6   |  29  |  1.12    Ca    8.7      19 May 2017 07:08  Phos  3.4       Mg     2.4         TPro  7.6  /  Alb  3.0<L>  /  TBili  1.1  /  DBili  x   /  AST  92<H>  /  ALT  54  /  AlkPhos  111        Urinalysis Basic - ( 18 May 2017 03:35 )    Color: Yellow / Appearance: Clear / S.010 / pH: x  Gluc: x / Ketone: Large  / Bili: Negative / Urobili: 4   Blood: x / Protein: 30 mg/dL / Nitrite: Negative   Leuk Esterase: Moderate / RBC: 2-5 /HPF / WBC 26-50 /HPF   Sq Epi: x / Non Sq Epi: Few / Bacteria: Many /HPF          RADIOLOGY & ADDITIONAL STUDIES REVIEWED:  ***    [ ]GOALS OF CARE DISCUSSION WITH PATIENT/FAMILY/PROXY:    CRITICAL CARE TIME SPENT: 35 minutes INTERVAL HPI/OVERNIGHT EVENTS:  76 year old lady from home, walks with assistance, , PMH of CVA ( Right frontal MCA , s/p TPA in ), small SAH ( acute in Right frontal region, 2017)  Fibromyalgia,  HTN and  DM2 had left carotid artery endarterectomy on 5/15/2017. She was in ICU for post op monitoring and downgraded to surgical floor.   On the floor, she had spiking fever and leukocytosis. T max is 100.7 in last 24 hour. SBP went down to 78 mmHg once last night and she received only 500 ml of bolus. Her BP has been stable  mmHg since then. WBC went down to 15 from 19. UA shows WBC. Abx was started on 2017.    She complained that she did not have good BM in last few days. She had 2 small loose BM last night. She also complained body pain.     the last vital sign is T(C): 36.6 HR: 87 BP: 101/43 RR: 16 SpO2: 99% on RA        PRESSORS: [ ] YES [x ] NO  WHICH:        Antimicrobial:  piperacillin/tazobactam IVPB. 3.375Gram(s) IV Intermittent every 8 hours starting on 2017.     Cardiovascular:  tamsulosin 0.4milliGRAM(s) Oral at bedtime  metoprolol 12.5milliGRAM(s) Oral every 12 hours    Pulmonary:  ALBUTerol    0.083% 2.5milliGRAM(s) Nebulizer every 6 hours    Hematalogic:  heparin  Injectable 5000Unit(s) SubCutaneous every 8 hours  aspirin  chewable 81milliGRAM(s) Oral daily    Other:  acetaminophen   Tablet. 650milliGRAM(s) Oral every 6 hours PRN  pregabalin 50milliGRAM(s) Oral at bedtime  docusate sodium 100milliGRAM(s) Oral two times a day  insulin lispro (HumaLOG) corrective regimen sliding scale  SubCutaneous at bedtime  pantoprazole    Tablet 40milliGRAM(s) Oral before breakfast  simvastatin 20milliGRAM(s) Oral at bedtime  acetaminophen  Suppository 650milliGRAM(s) Rectal every 6 hours PRN  insulin lispro (HumaLOG) corrective regimen sliding scale  SubCutaneous three times a day before meals  dextrose 5%. 1000milliLiter(s) IV Continuous <Continuous>  sodium chloride 0.9%. 1000milliLiter(s) IV Continuous <Continuous>    heparin  Injectable 5000Unit(s) SubCutaneous every 8 hours  aspirin  chewable 81milliGRAM(s) Oral daily  acetaminophen   Tablet. 650milliGRAM(s) Oral every 6 hours PRN  pregabalin 50milliGRAM(s) Oral at bedtime  docusate sodium 100milliGRAM(s) Oral two times a day  insulin lispro (HumaLOG) corrective regimen sliding scale  SubCutaneous at bedtime  pantoprazole    Tablet 40milliGRAM(s) Oral before breakfast  simvastatin 20milliGRAM(s) Oral at bedtime  tamsulosin 0.4milliGRAM(s) Oral at bedtime  acetaminophen  Suppository 650milliGRAM(s) Rectal every 6 hours PRN  metoprolol 12.5milliGRAM(s) Oral every 12 hours  piperacillin/tazobactam IVPB. 3.375Gram(s) IV Intermittent every 8 hours  ALBUTerol    0.083% 2.5milliGRAM(s) Nebulizer every 6 hours  insulin lispro (HumaLOG) corrective regimen sliding scale  SubCutaneous three times a day before meals  dextrose 5%. 1000milliLiter(s) IV Continuous <Continuous>  sodium chloride 0.9%. 1000milliLiter(s) IV Continuous <Continuous>    Drug Dosing Weight  Height (cm): 147.3 (15 May 2017 07:08)  Weight (kg): 71 (15 May 2017 16:00)  BMI (kg/m2): 32.7 (15 May 2017 16:00)  BSA (m2): 1.64 (15 May 2017 16:00)    CENTRAL LINE: [ ] YES [x ] NO  LOCATION:   DATE INSERTED:  REMOVE: [ ] YES [ ] NO  EXPLAIN:    PRYOR: [ x] YES [ ] NO    DATE INSERTED:  REMOVE:  [ ] YES [x ] NO  EXPLAIN: I/O monitoring    A-LINE:  [ ] YES [ x] NO  LOCATION:   DATE INSERTED:  REMOVE:  [ ] YES [ ] NO  EXPLAIN:    PMH -reviewed admission note, no change since admission  PAST MEDICAL & SURGICAL HISTORY:  Type 2 diabetes mellitus  Essential hypertension  DM (diabetes mellitus)  HTN (hypertension)  Fibromyalgia  History of appendectomy  No significant past surgical history      ICU Vital Signs Last 24 Hrs  T(C): 36.6, Max: 38.2 (05-18 @ 21:30)  T(F): 97.8, Max: 100.7 (18 @ 21:30)  HR: 87 (87 - 104)  BP: 101/43 (78/38 - 125/65)  BP(mean): --  ABP: --  ABP(mean): --  RR: 16 (16 - 20)  SpO2: 99% (98% - 100%)      ABG - ( 18 May 2017 08:55 )  pH: 7.46  /  pCO2: 38    /  pO2: 120   / HCO3: 27    / Base Excess: 3.2   /  SaO2: 96                PHYSICAL EXAM:    GENERAL: [x ]NAD, [ ]well-groomed, [x ]well-developed  HEAD:  [x ]Atraumatic, [ x]Normocephalic  EYES: , [x ]conjunctiva and sclera clear  ENMT: [x ]No tonsillar erythema, exudates, or enlargement; [x ]Moist mucous membranes,   NECK: x[ ]Supple, normal appearance, [x ]No JVD surgical dressing on left side  NERVOUS SYSTEM:  [x ]Alert & Oriented X3, [x ]Good concentration;  CHEST/LUNG: [x ]No chest deformity; [ x]Normal percussion bilaterally; [x ]No rales, rhonchi, wheezing   HEART: [ ]Regular rate and rhythm; [ ]No murmurs, rubs, or gallops  ABDOMEN: [x ]Soft, tender, distended; [x ]Bowel sounds present  EXTREMITIES:  [x ]2+ Peripheral Pulses,  SKIN: [x ]No rashes or lesions; [x ]Good capillary refill      LABS:  CBC Full  -  ( 19 May 2017 07:08 )  WBC Count : 15.0 K/uL from 19  Hemoglobin : 8.9 g/dL  Hematocrit : 23.6 %  Platelet Count - Automated : 254 K/uL  Mean Cell Volume : 82.8 fl  Mean Cell Hemoglobin : 31.1 pg  Mean Cell Hemoglobin Concentration : 37.5 gm/dL  Auto Neutrophil # : 12.9 K/uL  Auto Lymphocyte # : 1.1 K/uL  Auto Monocyte # : 1.0 K/uL  Auto Eosinophil # : 0.0 K/uL  Auto Basophil # : 0.0 K/uL  Auto Neutrophil % : 85.9 %  Auto Lymphocyte % : 7.2 %  Auto Monocyte % : 6.5 %  Auto Eosinophil % : 0.2 %  Auto Basophil % : 0.2 %        137  |  100  |  24<H>  ----------------------------<  221<H>  3.6   |  29  |  1.12    Ca    8.7      19 May 2017 07:08  Phos  3.4       Mg     2.4         TPro  7.6  /  Alb  3.0<L>  /  TBili  1.1  /  DBili  x   /  AST  92<H>  /  ALT  54  /  AlkPhos  111        Urinalysis Basic - ( 18 May 2017 03:35 )    Color: Yellow / Appearance: Clear / S.010 / pH: x  Gluc: x / Ketone: Large  / Bili: Negative / Urobili: 4   Blood: x / Protein: 30 mg/dL / Nitrite: Negative   Leuk Esterase: Moderate / RBC: 2-5 /HPF / WBC 26-50 /HPF   Sq Epi: x / Non Sq Epi: Few / Bacteria: Many /HPF

## 2017-05-19 NOTE — PROGRESS NOTE ADULT - SUBJECTIVE AND OBJECTIVE BOX
Patient seen and examined.   Time of visit: 1150    MEDICATIONS  (STANDING):  heparin  Injectable 5000Unit(s) SubCutaneous every 8 hours  aspirin  chewable 81milliGRAM(s) Oral daily  pregabalin 50milliGRAM(s) Oral at bedtime  docusate sodium 100milliGRAM(s) Oral two times a day  insulin lispro (HumaLOG) corrective regimen sliding scale  SubCutaneous at bedtime  pantoprazole    Tablet 40milliGRAM(s) Oral before breakfast  simvastatin 20milliGRAM(s) Oral at bedtime  tamsulosin 0.4milliGRAM(s) Oral at bedtime  metoprolol 12.5milliGRAM(s) Oral every 12 hours  piperacillin/tazobactam IVPB. 3.375Gram(s) IV Intermittent every 8 hours  ALBUTerol    0.083% 2.5milliGRAM(s) Nebulizer every 6 hours  insulin lispro (HumaLOG) corrective regimen sliding scale  SubCutaneous three times a day before meals  dextrose 5%. 1000milliLiter(s) IV Continuous <Continuous>  sodium chloride 0.9%. 1000milliLiter(s) IV Continuous <Continuous>  sodium chloride 0.9% Bolus 500milliLiter(s) IV Bolus once      MEDICATIONS  (PRN):  acetaminophen   Tablet. 650milliGRAM(s) Oral every 6 hours PRN Mild Pain (1 - 3)  acetaminophen  Suppository 650milliGRAM(s) Rectal every 6 hours PRN For Temp greater than 38 C (100.4 F)   Medications up to date at time of exam.      PHYSICAL EXAMINATION:  Patient has no new complaints.  GENERAL: The patient is a well-developed, well-nourished, in no apparent distress.     Vital Signs Last 24 Hrs  T(C): 36.6, Max: 38.2 (05-18 @ 21:30)  T(F): 97.8, Max: 100.7 (05-18 @ 21:30)  HR: 87 (87 - 104)  BP: 101/43 (78/38 - 125/65)  BP(mean): --  RR: 16 (16 - 20)  SpO2: 99% (98% - 100%)   (if applicable)      HEENT: Head is normocephalic and atraumatic. Extraocular muscles are intact. Mucous membranes are moist.     NECK: Supple, no palpable adenopathy, + L steristrips    LUNGS: Clear to auscultation anteriorly    HEART: + tachycardic and rhythm without murmur.    ABDOMEN: Soft, nontender, and nondistended.  No hepatosplenomegaly is noted.    EXTREMITIES: Without any cyanosis, clubbing, rash, lesions or edema.    NEUROLOGIC: Awake, alert, oriented.     SKIN: Warm, dry, good turgor.      LABS:                        8.9    15.0  )-----------( 254      ( 19 May 2017 07:08 )             23.6         137  |  100  |  24<H>  ----------------------------<  221<H>  3.6   |  29  |  1.12    Ca    8.7      19 May 2017 07:08  Phos  3.4       Mg     2.4         TPro  7.6  /  Alb  3.0<L>  /  TBili  1.1  /  DBili  x   /  AST  92<H>  /  ALT  54  /  AlkPhos  111        Urinalysis Basic - ( 18 May 2017 03:35 )    Color: Yellow / Appearance: Clear / S.010 / pH: x  Gluc: x / Ketone: Large  / Bili: Negative / Urobili: 4   Blood: x / Protein: 30 mg/dL / Nitrite: Negative   Leuk Esterase: Moderate / RBC: 2-5 /HPF / WBC 26-50 /HPF   Sq Epi: x / Non Sq Epi: Few / Bacteria: Many /HPF      ABG - ( 18 May 2017 08:55 )  pH: 7.46  /  pCO2: 38    /  pO2: 120   / HCO3: 27    / Base Excess: 3.2   /  SaO2: 96            Lactate, Blood: 1.3 mmol/L ( @ 07:08)  Lactate, Blood: 2.8 mmol/L ( @ 22:25)        MICROBIOLOGY: (if applicable)    RADIOLOGY & ADDITIONAL STUDIES:  EKG:   CXR:  ECHO:    IMPRESSION: 76y Female PAST MEDICAL & SURGICAL HISTORY:  Type 2 diabetes mellitus  Essential hypertension  DM (diabetes mellitus)  HTN (hypertension)  Fibromyalgia  History of appendectomy  No significant past surgical history    p/w s/p L CEA, + TIA    RECOMMENDATIONS:   - pt hypotensive yesterday s/p bolus   - pt tachycardic, con't to monitor   - con't with antibiotics as per ID recommendations.    - plan of care discussed with son

## 2017-05-19 NOTE — PROGRESS NOTE ADULT - PROBLEM SELECTOR PLAN 1
WBC is trending down. vital is stable.   - follow up with blood culture.   - give 1 L of bolus and continue IVF.   - follow up with ID specialist.  - discussed with surgical Ting REYES for the recommendation.

## 2017-05-19 NOTE — PROGRESS NOTE ADULT - SUBJECTIVE AND OBJECTIVE BOX
76 year old s/p  left carotid artery endarterectomy on 5/15/2017. Patient noted to be lethargic, febrile overnight with T max is 100.7 in last 24 hour. Patient also with hypotension overnight  SBP went down to 78 mmHg once last night and she received only 500 ml of bolus. Her BP has been stable  mmHg since then. WBC went down to 15 from 19. UA shows WBC. Abx was started on 2017.    She complained that she did not have good BM in last few days. She had 2 small loose BM last night. She also complained body pain.     the last vital sign is T(C): 36.6 HR: 87 BP: 101/43 RR: 16 SpO2: 99% on RA      Antimicrobial:  piperacillin/tazobactam IVPB. 3.375Gram(s) IV Intermittent every 8 hours starting on 2017.     Cardiovascular:  tamsulosin 0.4milliGRAM(s) Oral at bedtime  metoprolol 12.5milliGRAM(s) Oral every 12 hours    Pulmonary:  ALBUTerol    0.083% 2.5milliGRAM(s) Nebulizer every 6 hours    Hematalogic:  heparin  Injectable 5000Unit(s) SubCutaneous every 8 hours  aspirin  chewable 81milliGRAM(s) Oral daily    Other:  acetaminophen   Tablet. 650milliGRAM(s) Oral every 6 hours PRN  pregabalin 50milliGRAM(s) Oral at bedtime  docusate sodium 100milliGRAM(s) Oral two times a day  insulin lispro (HumaLOG) corrective regimen sliding scale  SubCutaneous at bedtime  pantoprazole    Tablet 40milliGRAM(s) Oral before breakfast  simvastatin 20milliGRAM(s) Oral at bedtime  acetaminophen  Suppository 650milliGRAM(s) Rectal every 6 hours PRN  insulin lispro (HumaLOG) corrective regimen sliding scale  SubCutaneous three times a day before meals  dextrose 5%. 1000milliLiter(s) IV Continuous <Continuous>  sodium chloride 0.9%. 1000milliLiter(s) IV Continuous <Continuous>    heparin  Injectable 5000Unit(s) SubCutaneous every 8 hours  aspirin  chewable 81milliGRAM(s) Oral daily  acetaminophen   Tablet. 650milliGRAM(s) Oral every 6 hours PRN  pregabalin 50milliGRAM(s) Oral at bedtime  docusate sodium 100milliGRAM(s) Oral two times a day  insulin lispro (HumaLOG) corrective regimen sliding scale  SubCutaneous at bedtime  pantoprazole    Tablet 40milliGRAM(s) Oral before breakfast  simvastatin 20milliGRAM(s) Oral at bedtime  tamsulosin 0.4milliGRAM(s) Oral at bedtime  acetaminophen  Suppository 650milliGRAM(s) Rectal every 6 hours PRN  metoprolol 12.5milliGRAM(s) Oral every 12 hours  piperacillin/tazobactam IVPB. 3.375Gram(s) IV Intermittent every 8 hours  ALBUTerol    0.083% 2.5milliGRAM(s) Nebulizer every 6 hours  insulin lispro (HumaLOG) corrective regimen sliding scale  SubCutaneous three times a day before meals  dextrose 5%. 1000milliLiter(s) IV Continuous <Continuous>  sodium chloride 0.9%. 1000milliLiter(s) IV Continuous <Continuous>    Drug Dosing Weight  Height (cm): 147.3 (15 May 2017 07:08)  Weight (kg): 71 (15 May 2017 16:00)  BMI (kg/m2): 32.7 (15 May 2017 16:00)  BSA (m2): 1.64 (15 May 2017 16:00)      PRYOR: [ x] YES [ ] NO    DATE INSERTED:  REMOVE:  [ ] YES [x ] NO  EXPLAIN: I/O monitoring      Vital Signs Last 24 Hrs  T(C): 36.6, Max: 38.2 (-18 @ 21:30)  T(F): 97.8, Max: 100.7 (0518 @ 21:30)  HR: 87 (87 - 104)  BP: 101/43 (78/38 - 125/65)    RR: 16 (16 - 20)  SpO2: 99% (98% - 100%)      ABG - ( 18 May 2017 08:55 )  pH: 7.46  /  pCO2: 38    /  pO2: 120   / HCO3: 27    / Base Excess: 3.2   /  SaO2: 96                PHYSICAL EXAM:    GENERAL: [x ]NAD, [ ]well-groomed, [x ]well-developed  HEAD:  [x ]Atraumatic, [ x]Normocephalic  EYES: , [x ]conjunctiva and sclera clear  ENMT: [x ]No tonsillar erythema, exudates, or enlargement; [x ]Moist mucous membranes,   NECK: x[ ]Supple, normal appearance, [x ]No JVD surgical dressing on left side  NERVOUS SYSTEM:  [x ]Alert & Oriented X3, [x ]Good concentration;  CHEST/LUNG: [x ]No chest deformity; [ x]Normal percussion bilaterally; [x ]No rales, rhonchi, wheezing   HEART: [ ]Regular rate and rhythm; [ ]No murmurs, rubs, or gallops  ABDOMEN: [x ]Soft, tender, distended; [x ]Bowel sounds present  EXTREMITIES:  [x ]2+ Peripheral Pulses,  SKIN: [x ]No rashes or lesions; [x ]Good capillary refill      LABS:  CBC Full  -  ( 19 May 2017 07:08 )  WBC Count : 15.0 K/uL from   Hemoglobin : 8.9 g/dL  Hematocrit : 23.6 %  Platelet Count - Automated : 254 K/uL  Mean Cell Volume : 82.8 fl  Mean Cell Hemoglobin : 31.1 pg  Mean Cell Hemoglobin Concentration : 37.5 gm/dL  Auto Neutrophil # : 12.9 K/uL  Auto Lymphocyte # : 1.1 K/uL  Auto Monocyte # : 1.0 K/uL  Auto Eosinophil # : 0.0 K/uL  Auto Basophil # : 0.0 K/uL  Auto Neutrophil % : 85.9 %  Auto Lymphocyte % : 7.2 %  Auto Monocyte % : 6.5 %  Auto Eosinophil % : 0.2 %  Auto Basophil % : 0.2 %        137  |  100  |  24<H>  ----------------------------<  221<H>  3.6   |  29  |  1.12    Ca    8.7      19 May 2017 07:08  Phos  3.4     -  Mg     2.4         TPro  7.6  /  Alb  3.0<L>  /  TBili  1.1  /  DBili  x   /  AST  92<H>  /  ALT  54  /  AlkPhos  111  -18      Urinalysis Basic - ( 18 May 2017 03:35 )    Color: Yellow / Appearance: Clear / S.010 / pH: x  Gluc: x / Ketone: Large  / Bili: Negative / Urobili: 4   Blood: x / Protein: 30 mg/dL / Nitrite: Negative   Leuk Esterase: Moderate / RBC: 2-5 /HPF / WBC 26-50 /HPF   Sq Epi: x / Non Sq Epi: Few / Bacteria: Many /HPF

## 2017-05-19 NOTE — PROGRESS NOTE ADULT - ASSESSMENT
Patient is alert.  No SOB.  Fluid overload has resolved.  Fever and leukocytosis are improving, though source is unclear.   We will try to mobilize her to chair and to begin PT.

## 2017-05-19 NOTE — PROGRESS NOTE ADULT - PROBLEM SELECTOR PLAN 1
- ICU eval   - follow up with blood culture.   - give 1 L of bolus and continue IVF.   - follow up with ID specialist.  - repeat CBC in setting of acute drop in H and H   - transfer to medical service

## 2017-05-19 NOTE — PROGRESS NOTE ADULT - ASSESSMENT
fevers unknown source   possible uti based on u/a but urine krueger is clear.  leukocytosis decreasing   plan -cont zosyn 3.375gms iv q8hrs

## 2017-05-19 NOTE — PROGRESS NOTE ADULT - SUBJECTIVE AND OBJECTIVE BOX
76y Female whom I saw last night for a high fever of 103 yesterday morning and a low grade temp last night along with an elevated wbc count. she had abdominal pain last night and was lethargic and confused but is dramatically better today. she is awake,alert and answering all questions appropiately . her son is at the bedside.she denies abdominal pain today except on deep palpation. no other specific complaints.    Meds:  piperacillin/tazobactam IVPB. 3.375Gram(s) IV Intermittent every 8 hours    Allergies    No Known Allergies    Intolerances        VITALS:  Vital Signs Last 24 Hrs  T(C): 36.4, Max: 38.2 (05-18 @ 21:30)  T(F): 97.5, Max: 100.7 (05-18 @ 21:30)  HR: 100 (87 - 116)  BP: 101/48 (78/38 - 134/60)  BP(mean): --  RR: 18 (16 - 20)  SpO2: 96% (96% - 100%)    LABS/DIAGNOSTIC TESTS:                          9.5    15.9  )-----------( 283      ( 19 May 2017 12:23 )             27.6     Lactate, Blood: 1.3 mmol/L (05-19 @ 07:08)  Lactate, Blood: 2.8 mmol/L (05-18 @ 22:25)      05-19    137  |  100  |  24<H>  ----------------------------<  221<H>  3.6   |  29  |  1.12    Ca    8.7      19 May 2017 07:08  Phos  3.4     05-19  Mg     2.4     05-19    TPro  7.6  /  Alb  3.0<L>  /  TBili  1.1  /  DBili  x   /  AST  92<H>  /  ALT  54  /  AlkPhos  111  05-18      LIVER FUNCTIONS - ( 18 May 2017 04:12 )  Alb: 3.0 g/dL / Pro: 7.6 g/dL / ALK PHOS: 111 U/L / ALT: 54 U/L DA / AST: 92 U/L / GGT: x             CULTURES: .Blood Blood  05-18 @ 10:37   No growth to date.  --  --            RADIOLOGY:      ROS:  [  ] UNABLE TO ELICIT

## 2017-05-19 NOTE — PROGRESS NOTE ADULT - SUBJECTIVE AND OBJECTIVE BOX
I examined this patient this afternoon     Patient is a 76y old  Female who presents with a chief complaint of Brought by son for sudden onset of tongue heaviness, slurred speech since 9:00 PM (12 May 2017 08:43)  Patient was known to have 70 % occulusion of the left carotid artery.  During hospitalization she underwent a left carotid endarterectomy. She recovered well in ICU.   Three nights ago she developed SOB.  She had rales and CXR c/w fluid overload.  CT pulmonary angio  was negative.  She was given diuretics.  Jj catheter was placed and re-placed because of urinary retention. The following night she developed fever.  Cultures were sent and she was given empirical antibiotics.  Last night she had low BP and responded to a fluid bolus.     MEDICATIONS  (STANDING):  heparin  Injectable 5000Unit(s) SubCutaneous every 8 hours  aspirin  chewable 81milliGRAM(s) Oral daily  pregabalin 50milliGRAM(s) Oral at bedtime  docusate sodium 100milliGRAM(s) Oral two times a day  insulin lispro (HumaLOG) corrective regimen sliding scale  SubCutaneous at bedtime  pantoprazole    Tablet 40milliGRAM(s) Oral before breakfast  simvastatin 20milliGRAM(s) Oral at bedtime  tamsulosin 0.4milliGRAM(s) Oral at bedtime  metoprolol 12.5milliGRAM(s) Oral every 12 hours  piperacillin/tazobactam IVPB. 3.375Gram(s) IV Intermittent every 8 hours  ALBUTerol    0.083% 2.5milliGRAM(s) Nebulizer every 6 hours  insulin lispro (HumaLOG) corrective regimen sliding scale  SubCutaneous three times a day before meals  dextrose 5%. 1000milliLiter(s) IV Continuous <Continuous>  sodium chloride 0.9%. 1000milliLiter(s) IV Continuous <Continuous>  sodium chloride 0.9% Bolus 500milliLiter(s) IV Bolus once  sodium biphosphate Rectal Enema 1Enema Rectal once    MEDICATIONS  (PRN):  acetaminophen  Suppository 650milliGRAM(s) Rectal every 6 hours PRN For Temp greater than 38 C (100.4 F)  acetaminophen   Tablet. 650milliGRAM(s) Oral every 6 hours PRN Mild Pain (1 - 3)      Allergies    No Known Allergies    She is alert and oriented.  She appears to have been diaphoretic before, but is now comfortable.   She has a decreasing daily T max.     Vital Signs Last 24 Hrs  T(C): 36.4, Max: 38.2 ( @ 21:30)  T(F): 97.5, Max: 100.7 ( @ 21:30)  HR: 100 (87 - 116)  BP: 101/48 (78/38 - 134/60)  BP(mean): --  RR: 18 (16 - 20)  SpO2: 96% (96% - 100%)      EYES: EOMI, PERRLA, conjunctiva and sclera clear  SKIN: No rashes or lesions  Left neck surgical site has no drainage.   CHEST/LUNG: Clear to percussion bilaterally; No rales.  HEART: Regular rate and rhythm ;III/VI systolic murmur  ABDOMEN: Soft, Nontender, Nondistended; Bowel sounds present  EXTREMITIES:  2+ Peripheral Pulses, No clubbing, cyanosis, or edema    NERVOUS SYSTEM:  Alert & Oriented X3, Good concentration; Motor Strength 5/5 B/L upper and lower extremities; DTRs 2+ intact and symmetric    LABS:                        9.5    15.9  )-----------( 283      ( 19 May 2017 12:23 )             27.6         137  |  100  |  24<H>  ----------------------------<  221<H>  3.6   |  29  |  1.12    Ca    8.7      19 May 2017 07:08  Phos  3.4       Mg     2.4         TPro  7.6  /  Alb  3.0<L>  /  TBili  1.1  /  DBili  x   /  AST  92<H>  /  ALT  54  /  AlkPhos  111        Urinalysis Basic - ( 18 May 2017 03:35 )    Color: Yellow / Appearance: Clear / S.010 / pH: x  Gluc: x / Ketone: Large  / Bili: Negative / Urobili: 4   Blood: x / Protein: 30 mg/dL / Nitrite: Negative   Leuk Esterase: Moderate / RBC: 2-5 /HPF / WBC 26-50 /HPF   Sq Epi: x / Non Sq Epi: Few / Bacteria: Many /HPF      CAPILLARY BLOOD GLUCOSE  279 (19 May 2017 16:06)  248 (19 May 2017 11:35)  264 (19 May 2017 07:30)  329 (18 May 2017 21:30)      RADIOLOGY & ADDITIONAL TESTS:      Care Discussed with Consultants/Other Providers [ x] YES  [ ] NO

## 2017-05-20 DIAGNOSIS — K62.89 OTHER SPECIFIED DISEASES OF ANUS AND RECTUM: ICD-10-CM

## 2017-05-20 LAB
-  AMPICILLIN: SIGNIFICANT CHANGE UP
-  CIPROFLOXACIN: SIGNIFICANT CHANGE UP
-  NITROFURANTOIN: SIGNIFICANT CHANGE UP
-  TETRACYCLINE: SIGNIFICANT CHANGE UP
-  VANCOMYCIN: SIGNIFICANT CHANGE UP
CULTURE RESULTS: SIGNIFICANT CHANGE UP
METHOD TYPE: SIGNIFICANT CHANGE UP
ORGANISM # SPEC MICROSCOPIC CNT: SIGNIFICANT CHANGE UP
ORGANISM # SPEC MICROSCOPIC CNT: SIGNIFICANT CHANGE UP
SPECIMEN SOURCE: SIGNIFICANT CHANGE UP

## 2017-05-20 PROCEDURE — 99232 SBSQ HOSP IP/OBS MODERATE 35: CPT

## 2017-05-20 RX ORDER — NYSTATIN CREAM 100000 [USP'U]/G
1 CREAM TOPICAL
Qty: 0 | Refills: 0 | Status: DISCONTINUED | OUTPATIENT
Start: 2017-05-20 | End: 2017-05-24

## 2017-05-20 RX ORDER — HYDROCORTISONE 1 %
1 OINTMENT (GRAM) TOPICAL DAILY
Qty: 0 | Refills: 0 | Status: DISCONTINUED | OUTPATIENT
Start: 2017-05-20 | End: 2017-05-20

## 2017-05-20 RX ADMIN — NYSTATIN CREAM 1 APPLICATION(S): 100000 CREAM TOPICAL at 19:06

## 2017-05-20 RX ADMIN — SIMVASTATIN 20 MILLIGRAM(S): 20 TABLET, FILM COATED ORAL at 21:59

## 2017-05-20 RX ADMIN — Medication 2: at 12:28

## 2017-05-20 RX ADMIN — ALBUTEROL 2.5 MILLIGRAM(S): 90 AEROSOL, METERED ORAL at 09:15

## 2017-05-20 RX ADMIN — Medication 650 MILLIGRAM(S): at 11:13

## 2017-05-20 RX ADMIN — Medication 4: at 17:37

## 2017-05-20 RX ADMIN — HEPARIN SODIUM 5000 UNIT(S): 5000 INJECTION INTRAVENOUS; SUBCUTANEOUS at 13:15

## 2017-05-20 RX ADMIN — PIPERACILLIN AND TAZOBACTAM 25 GRAM(S): 4; .5 INJECTION, POWDER, LYOPHILIZED, FOR SOLUTION INTRAVENOUS at 13:14

## 2017-05-20 RX ADMIN — Medication 50 MILLIGRAM(S): at 21:59

## 2017-05-20 RX ADMIN — PANTOPRAZOLE SODIUM 40 MILLIGRAM(S): 20 TABLET, DELAYED RELEASE ORAL at 05:57

## 2017-05-20 RX ADMIN — Medication 650 MILLIGRAM(S): at 12:15

## 2017-05-20 RX ADMIN — Medication 4: at 08:28

## 2017-05-20 RX ADMIN — HEPARIN SODIUM 5000 UNIT(S): 5000 INJECTION INTRAVENOUS; SUBCUTANEOUS at 05:57

## 2017-05-20 RX ADMIN — Medication 81 MILLIGRAM(S): at 11:16

## 2017-05-20 RX ADMIN — Medication 100 MILLIGRAM(S): at 05:57

## 2017-05-20 RX ADMIN — Medication 12.5 MILLIGRAM(S): at 05:57

## 2017-05-20 RX ADMIN — TAMSULOSIN HYDROCHLORIDE 0.4 MILLIGRAM(S): 0.4 CAPSULE ORAL at 21:59

## 2017-05-20 RX ADMIN — PIPERACILLIN AND TAZOBACTAM 25 GRAM(S): 4; .5 INJECTION, POWDER, LYOPHILIZED, FOR SOLUTION INTRAVENOUS at 21:59

## 2017-05-20 RX ADMIN — PIPERACILLIN AND TAZOBACTAM 25 GRAM(S): 4; .5 INJECTION, POWDER, LYOPHILIZED, FOR SOLUTION INTRAVENOUS at 05:57

## 2017-05-20 RX ADMIN — Medication 12.5 MILLIGRAM(S): at 17:41

## 2017-05-20 RX ADMIN — SODIUM CHLORIDE 75 MILLILITER(S): 9 INJECTION INTRAMUSCULAR; INTRAVENOUS; SUBCUTANEOUS at 05:57

## 2017-05-20 RX ADMIN — HEPARIN SODIUM 5000 UNIT(S): 5000 INJECTION INTRAVENOUS; SUBCUTANEOUS at 22:02

## 2017-05-20 NOTE — PROGRESS NOTE ADULT - ASSESSMENT
fevers unknown source   possible uti based on u/a but urine krueger is clear.  leukocytosis decreasing   plan -cont zosyn 3.375gms iv q8hrs fevers unknown source   blood and urine cultures neg  leukocytosis  plan -cont zosyn 3.375gms iv q8hrs for now , will give till monday and then dc all abxs if no source found.

## 2017-05-20 NOTE — PHYSICAL THERAPY INITIAL EVALUATION ADULT - PLANNED THERAPY INTERVENTIONS, PT EVAL
transfer training/bed mobility training/strengthening/gait training
gait training/bed mobility training/strengthening/transfer training/balance training

## 2017-05-20 NOTE — PROGRESS NOTE ADULT - PROBLEM SELECTOR PROBLEM 3
Suprapubic tenderness
Carotid artery stenosis, unilateral, left
Essential hypertension
Carotid artery stenosis, unilateral, left
Carotid artery stenosis, unilateral, left
Myalgia

## 2017-05-20 NOTE — PROGRESS NOTE ADULT - SUBJECTIVE AND OBJECTIVE BOX
76y Female    Meds:  piperacillin/tazobactam IVPB. 3.375Gram(s) IV Intermittent every 8 hours    Allergies    No Known Allergies    Intolerances        VITALS:  Vital Signs Last 24 Hrs  T(C): 36.7, Max: 36.7 (05-19 @ 20:06)  T(F): 98.1, Max: 98.1 (05-20 @ 05:33)  HR: 103 (96 - 116)  BP: 105/50 (101/48 - 134/60)  BP(mean): --  RR: 16 (16 - 18)  SpO2: 96% (94% - 98%)    LABS/DIAGNOSTIC TESTS:                          9.5    15.9  )-----------( 283      ( 19 May 2017 12:23 )             27.6         05-19    137  |  100  |  24<H>  ----------------------------<  221<H>  3.6   |  29  |  1.12    Ca    8.7      19 May 2017 07:08  Phos  3.4     05-19  Mg     2.4     05-19            CULTURES: .Urine Catheterized  05-18 @ 17:46   10,000 - 49,000 CFU/mL Enterococcus faecalis  --  --      .Blood Blood  05-18 @ 10:37   No growth to date.  --  --            RADIOLOGY:      ROS:  [  ] UNABLE TO ELICIT 76y Female who is doing much better clinically , she is not febrile and is overall feeling better though she does c/o rectal pain post enema 2 days ago.she is A.A.Ox 3.her appetite is still poor as per son.she has no cough or sob.    Meds:  piperacillin/tazobactam IVPB. 3.375Gram(s) IV Intermittent every 8 hours    Allergies    No Known Allergies    Intolerances        VITALS:  Vital Signs Last 24 Hrs  T(C): 36.7, Max: 36.7 (05-19 @ 20:06)  T(F): 98.1, Max: 98.1 (05-20 @ 05:33)  HR: 103 (96 - 116)  BP: 105/50 (101/48 - 134/60)  BP(mean): --  RR: 16 (16 - 18)  SpO2: 96% (94% - 98%)    LABS/DIAGNOSTIC TESTS:                          9.5    15.9  )-----------( 283      ( 19 May 2017 12:23 )             27.6         05-19    137  |  100  |  24<H>  ----------------------------<  221<H>  3.6   |  29  |  1.12    Ca    8.7      19 May 2017 07:08  Phos  3.4     05-19  Mg     2.4     05-19            CULTURES: .Urine Catheterized  05-18 @ 17:46   10,000 - 49,000 CFU/mL Enterococcus faecalis  --  --      .Blood Blood  05-18 @ 10:37   No growth to date.  --  --            RADIOLOGY:      ROS:  [  ] UNABLE TO ELICIT

## 2017-05-20 NOTE — PROGRESS NOTE ADULT - PROBLEM SELECTOR PLAN 5
-holding metformin 750 q12 (outpt med)  -c/w iHSS for now  -received 4U Lantus overnight
Will re-start Lyrica.
-Patient is euvolemic , could be due to HCTz, SIADH ( prior CVA) .  -hold HCTz for now  -f/u urine studies
-Patient is euvolemic , could be due to HCTz, SIADH ( prior CVA) .  -hold HCTz for now  -f/u urine studies
Resolved.
DVT and GI ppx.

## 2017-05-20 NOTE — PROGRESS NOTE ADULT - PROBLEM SELECTOR PLAN 2
-pt for carotid endarterectomy today
Carotid endarterectomy is indicated.
-pt for CEA on Monday with Dr. Ritchie pending clearance from cardiology (Dr. Yoo)
-pt for carotid endarterectomy on Monday with Dr. Ritchie pending clearance from cardiology (Dr. Yoo)
Local irritation.  Will discontinue steroid cream and Rx antifungal powder.
most likely secondary to constipation.   - give enough stool softener.
most likely secondary to constipation.   - give enough stool softener.
cw asa for now; patient c/o PAD if recommended by vascular can switch asa to plavic  change lipitor to zocor 20mg

## 2017-05-20 NOTE — PHYSICAL THERAPY INITIAL EVALUATION ADULT - IMPAIRED TRANSFERS: SIT/STAND, REHAB EVAL
decreased strength/impaired motor control
decreased balance/decreased strength/decreased flexibility

## 2017-05-20 NOTE — PHYSICAL THERAPY INITIAL EVALUATION ADULT - GENERAL OBSERVATIONS, REHAB EVAL
pt observe bedside with resolving left sided paresis, alert oriented verbally responsive cooperative with son at bedside, modified adl independence and baseline ambulatory with assistance
Pt. found lying supine in NAD; dressing on left side of neck c/d/i. Pt. connected to IV and krueger catheter. Pt. cooperative and motivated during eval. Her son was present at bedside.

## 2017-05-20 NOTE — PROGRESS NOTE ADULT - PROBLEM SELECTOR PLAN 3
-Bladder scan performed showed retention  -krueger catheter placed, patietn drained over 500  -will keep for now as patient is going for surgery
Moderate control.
-hold HCTZ for hyponatremia  -Hold anti HTN for permissive HTN x 48 hours, restart accordingly   -Home meds losartan / -25 mg.
-hold HCTZ for hyponatremia  -Hold anti HTN for permissive HTN x 48 hours, restart accordingly   -Home meds losartan / -25 mg.  -BP controlled at this time
S/p endarterectomy.  Cicatrix is healing
- follow up with surgery
will plan on provastatin as outpatient but as unavailable in hospital will give zocor 20mg hs for now

## 2017-05-20 NOTE — PROGRESS NOTE ADULT - PROBLEM SELECTOR PROBLEM 4
Essential hypertension
Fibromyalgia
Type 2 diabetes mellitus
Fibromyalgia

## 2017-05-20 NOTE — PHYSICAL THERAPY INITIAL EVALUATION ADULT - DIAGNOSIS, PT EVAL
TIA, Hemiparesis, Unsteady Gait
Overall decline in functional mobility due to generalized weakness, decreased balance and endurance.

## 2017-05-20 NOTE — PHYSICAL THERAPY INITIAL EVALUATION ADULT - IMPAIRMENTS FOUND, PT EVAL
gait, locomotion, and balance/fine motor/cognitive impairment/neuromotor development and sensory integration/gross motor/decreased midline orientation/muscle strength
aerobic capacity/endurance/gait, locomotion, and balance/gross motor/muscle strength

## 2017-05-20 NOTE — PROGRESS NOTE ADULT - PROBLEM SELECTOR PLAN 6
-Patient is euvolemic , could be due to HCTz, SIADH ( prior CVA) .  -hold HCTz for now
Will hold hydrochlorthiazide. Urine osmal.
-pt is on lyrica q12  -pt complaining of fatigue; will cut down to once nightly as d/w attending
-pt is on lyrica q12  -pt complaining of fatigue; will cut down to once nightly as d/w attending
Good control.  Patient is on flomax for urinary retention.  ACE inhibitor is held for now.

## 2017-05-20 NOTE — PHYSICAL THERAPY INITIAL EVALUATION ADULT - CRITERIA FOR SKILLED THERAPEUTIC INTERVENTIONS
risk reduction/prevention/functional limitations in following categories
rehab potential/functional limitations in following categories/impairments found/therapy frequency/anticipated discharge recommendation/risk reduction/prevention/predicted duration of therapy intervention

## 2017-05-20 NOTE — PROGRESS NOTE ADULT - PROBLEM SELECTOR PROBLEM 2
Carotid artery stenosis, unilateral, left
Carotid artery stenosis, unilateral, left
Abdominal distension
Carotid artery stenosis, unilateral, left
Carotid artery stenosis, unilateral, left
Perianal pain
Abdominal distension
Stroke

## 2017-05-20 NOTE — PROGRESS NOTE ADULT - SUBJECTIVE AND OBJECTIVE BOX
Post Operative Day #:     SUBJECTIVE:76yFemale    Flatus:     Yes       Bowel Movement: Yes w/o enema. Pt states stool was hardened    Pain Control Adequate: Yes    Nausea:      No     Vomiting: NO    Diet: Reg      MEDICATIONS  (STANDING):  heparin  Injectable 5000Unit(s) SubCutaneous every 8 hours  aspirin  chewable 81milliGRAM(s) Oral daily  pregabalin 50milliGRAM(s) Oral at bedtime  docusate sodium 100milliGRAM(s) Oral two times a day  insulin lispro (HumaLOG) corrective regimen sliding scale  SubCutaneous at bedtime  pantoprazole    Tablet 40milliGRAM(s) Oral before breakfast  simvastatin 20milliGRAM(s) Oral at bedtime  tamsulosin 0.4milliGRAM(s) Oral at bedtime  metoprolol 12.5milliGRAM(s) Oral every 12 hours  piperacillin/tazobactam IVPB. 3.375Gram(s) IV Intermittent every 8 hours  ALBUTerol    0.083% 2.5milliGRAM(s) Nebulizer every 6 hours  insulin lispro (HumaLOG) corrective regimen sliding scale  SubCutaneous three times a day before meals  dextrose 5%. 1000milliLiter(s) IV Continuous <Continuous>  sodium chloride 0.9%. 1000milliLiter(s) IV Continuous <Continuous>  sodium chloride 0.9% Bolus 500milliLiter(s) IV Bolus once    MEDICATIONS  (PRN):  acetaminophen  Suppository 650milliGRAM(s) Rectal every 6 hours PRN For Temp greater than 38 C (100.4 F)  acetaminophen   Tablet. 650milliGRAM(s) Oral every 6 hours PRN Mild Pain (1 - 3)      OBJECTIVE:  Vital Signs Last 24 Hrs  T(C): 36.7, Max: 36.7 (05-19 @ 20:06)  T(F): 98.1, Max: 98.1 (05-20 @ 05:33)  HR: 96 (96 - 116)  BP: 118/77 (101/48 - 134/60)  BP(mean): --  RR: 16 (16 - 18)  SpO2: 96% (94% - 98%)  I&O's Detail    I & Os for current day (as of 20 May 2017 08:39)  =============================================  IN:    sodium chloride 0.9%.: 3275 ml    Sodium Chloride 0.9% IV Bolus: 500 ml    IV PiggyBack: 300 ml    Total IN: 4075 ml  ---------------------------------------------  OUT:    Indwelling Catheter - Urethral: 1100 ml    Total OUT: 1100 ml  ---------------------------------------------  Total NET: 2975 ml                            9.5    15.9  )-----------( 283      ( 19 May 2017 12:23 )             27.6     19 May 2017 07:08    137    |  100    |  24     ----------------------------<  221    3.6     |  29     |  1.12   18 May 2017 04:12    137    |  98     |  12     ----------------------------<  179    3.8     |  30     |  0.86     Ca    8.7        19 May 2017 07:08  Ca    9.1        18 May 2017 04:12  Phos  3.4       19 May 2017 07:08  Mg     2.4       19 May 2017 07:08    TPro  7.6    /  Alb  3.0    /  TBili  1.1    /  DBili  x      /  AST  92     /  ALT  54     /  AlkPhos  111    18 May 2017 04:12        Physical Exam:  Gen: awake, alert oriented NAD  HEENT: L neck wound steristripped, no erythema, tenderness or swelling  Abdomen: soft mildly distended, less tympanic  Extremities: warm to touch, no c/c/e      EXAM:  CHEST-PORTABLE STAT                            PROCEDURE DATE:  05/19/2017        INTERPRETATION:  CLINICAL INFORMATION:sob    TECHNIQUE: AP chest film. Comparison is made to 5/18/2017    FINDINGS: There is improved interstitial prominence of the prior study.   Heart size appears enlarged, likely exaggerated by projection. There are   degenerative changes of the thoracic spine..    IMPRESSION: Improved pulmonary vascular congestion compared to 5/18/2017

## 2017-05-20 NOTE — PHYSICAL THERAPY INITIAL EVALUATION ADULT - LEVEL OF INDEPENDENCE: STAIR NEGOTIATION, REHAB EVAL
n/a elevator access residence
unable to perform/Pt. does not require stairs to access / negotiate at home.

## 2017-05-20 NOTE — PROGRESS NOTE ADULT - PROBLEM SELECTOR PROBLEM 5
Prophylactic measure
Type 2 diabetes mellitus
Fibromyalgia
Hyponatremia
Hyponatremia
Sepsis, due to unspecified organism
Prophylactic measure

## 2017-05-20 NOTE — PROGRESS NOTE ADULT - PROBLEM SELECTOR PLAN 4
-hold HCTZ for hyponatremia  -Hold anti HTN for permissive HTN x 48 hours, restart accordingly   -Home meds losartan / -25 mg.  -BP controlled at this time
Stable.  Will hold oral agents and cover with insulin during hospitalization.
-holding metformin 750 q12 (outpt med)  -c/w iHSS for now
-holding metformin 750 q12 (outpt med)  -c/w iHSS for now
well-controlled.
complained of whole body pain.   - give enough pain management.

## 2017-05-20 NOTE — PROGRESS NOTE ADULT - SUBJECTIVE AND OBJECTIVE BOX
Patient is a 76y old  Female who presents with a chief complaint of Brought by son for sudden onset of tongue heaviness, slurred speech since 9:00 PM (12 May 2017 08:43).  Neurologic symptoms resolved.  She underwent a left carotid endarterectomy because of 70% occulusion.  After MICU, she developed SOB, fluid overload, urinary retention, fever.       INTERVAL HPI/OVERNIGHT EVENTS: She c/o christopher-anal pain    MEDICATIONS  (STANDING):  heparin  Injectable 5000Unit(s) SubCutaneous every 8 hours  aspirin  chewable 81milliGRAM(s) Oral daily  pregabalin 50milliGRAM(s) Oral at bedtime  docusate sodium 100milliGRAM(s) Oral two times a day  insulin lispro (HumaLOG) corrective regimen sliding scale  SubCutaneous at bedtime  pantoprazole    Tablet 40milliGRAM(s) Oral before breakfast  simvastatin 20milliGRAM(s) Oral at bedtime  tamsulosin 0.4milliGRAM(s) Oral at bedtime  metoprolol 12.5milliGRAM(s) Oral every 12 hours  piperacillin/tazobactam IVPB. 3.375Gram(s) IV Intermittent every 8 hours  insulin lispro (HumaLOG) corrective regimen sliding scale  SubCutaneous three times a day before meals  dextrose 5%. 1000milliLiter(s) IV Continuous <Continuous>  sodium chloride 0.9%. 1000milliLiter(s) IV Continuous <Continuous>  sodium chloride 0.9% Bolus 500milliLiter(s) IV Bolus once  nystatin Powder 1Application(s) Topical two times a day    MEDICATIONS  (PRN):  acetaminophen  Suppository 650milliGRAM(s) Rectal every 6 hours PRN For Temp greater than 38 C (100.4 F)  acetaminophen   Tablet. 650milliGRAM(s) Oral every 6 hours PRN Mild Pain (1 - 3)    Allergies    No Known Allergies    REVIEW OF SYSTEMS:  CONSTITUTIONAL: No fever, weight loss, or fatigue    RESPIRATORY: No cough, wheezing, chills or hemoptysis; No shortness of breath today.   CARDIOVASCULAR: No chest pain, palpitations, dizziness, or leg swelling  GASTROINTESTINAL: No abdominal or epigastric pain. No nausea, vomiting, or hematemesis;  No melena or hematochezia. She c/o perianal pain.  Constipation is responding to cathartics.   GENITOURINARY: No dysuria, frequency, hematuria, or incontinence  NEUROLOGICAL: No headaches, memory loss, loss of strength, numbness, or tremors  SKIN: No itching, burning, rashes, or lesions   ENDOCRINE: No heat or cold intolerance; No hair loss  MUSCULOSKELETAL: No joint pain or swelling; No muscle, back, or extremity pain  PSYCHIATRIC: No depression, anxiety, mood swings, or difficulty sleeping    Alert, cooperative woman, in NAD, using nasal oxygen  Vital Signs Last 24 Hrs  T(C): 36.8, Max: 36.9 (05-20 @ 14:49)  T(F): 98.3, Max: 98.4 (05-20 @ 14:49)  HR: 88 (80 - 103)  BP: 125/64 (105/50 - 125/64)  BP(mean): --  RR: 16 (16 - 18)  SpO2: 97% (95% - 97%)      EYES: EOMI, PERRLA, conjunctiva and sclera clear  SKIN: No rashes or lesions  CHEST/LUNG: Clear to percussion bilaterally; No rales, rhonchi, wheezing, or rubs  HEART: Regular rate and rhythm; No murmurs, rubs, or gallops  ABDOMEN: Soft, Nontender, Nondistended; Bowel sounds present  Rectal exam:  soft stool mixed with firm. Steroid cream has been applied to the area.   EXTREMITIES:  2+ Peripheral Pulses, No clubbing, cyanosis, or edema      LABS:                        9.5    15.9  )-----------( 283      ( 19 May 2017 12:23 )             27.6     05-19    137  |  100  |  24<H>  ----------------------------<  221<H>  3.6   |  29  |  1.12    Ca    8.7      19 May 2017 07:08  Phos  3.4     05-19  Mg     2.4     05-19      CAPILLARY BLOOD GLUCOSE  162 (20 May 2017 20:24)  212 (20 May 2017 16:07)  182 (20 May 2017 12:18)  223 (20 May 2017 08:09)      RADIOLOGY & ADDITIONAL TESTS:    XRay:    CT Scan:    Imaging Personally Reviewed:  [ ] YES  [ ] NO    Consultant(s) Notes Reviewed:  [ ] YES  [ ] NO    Care Discussed with Consultants/Other Providers [ ] YES  [ ] NO

## 2017-05-20 NOTE — PHYSICAL THERAPY INITIAL EVALUATION ADULT - LIVES WITH, PROFILE
Son in an apartment with elevator access; no stairs to enter the building.
elevator apartment/children

## 2017-05-20 NOTE — PROGRESS NOTE ADULT - SUBJECTIVE AND OBJECTIVE BOX
HPI:  77 y/o F, home, walks with assistance, lives with son, AAOx3,  w/ PMH of CVA ( Right frontal MCA , s/p TPA in ), small SAH ( acute in Right frontal region, 2017)  Fibromyalgia,  HTN ( Losartan HCTz 100/25),  DM2 ( Metformin 750mg BID, last HBA1c is 7.3 ), presented with tongue heaviness, fatigue, since 9;00 pm of 5/10/17. History was provided by the son at the bedside, according to him she was sitting on the couch and talking to him, when suddenly he noticed that she is having slurred speech, mumbling, tongue heaviness. As per patient the whole day yesterday was very busy as she was doing the packing for her upcoming trip. She reports that she was feeling tired, and wanted to sleep. Denies any headache trauma to head, blurring of vision, neck pain, chest pain, SOB cough, fever, abdominal pain, urinary , bowel complains. Son said that it looks like she is about to pass out, but did not lost consciousness Patient reports having body aches, bilateral knee pain, leg pain, neck pain. As per son her symptoms of pain likely due to fibromyalgia and also due to high intensity statin.     Patient was last admitted to [facility] in . with CVA , got TPA , admitted to ICU for monitoring, it was acute right MCA , later was found to have small right frontal SAH, also has left sided carotid artery stenosis with > 70 %. She was recommended to follow up with Vascular Surgeon Dr Dubois for possible left end arterectomy, cardio f/up with Dr Guzman ( as she was started on nimodipine for SAH ) , and also with Dr Wilson. Patient did not follow up with anyone, except her PCP, also she recently lost her  about 15 days ago. Son expresses the concern regarding statin . Currently since last week she is taking 20 mg of lipitor.     Fhx: No family history of strokes, mother  at age of 73 due to heart attack (11 May 2017 02:56)      INTERVAL HPI/OVERNIGHT EVENTS: ***  PAST MEDICAL & SURGICAL HISTORY:  Type 2 diabetes mellitus  Essential hypertension  DM (diabetes mellitus)  HTN (hypertension)  Fibromyalgia  History of appendectomy  No significant past surgical history      ANTIBIOTICS:                  DATE STARTED:  ANTIBIOTICS:                  DATE STARTED:  ANTIBIOTICS:                  DATE STARTED:    Antimicrobial:  piperacillin/tazobactam IVPB. 3.375Gram(s) IV Intermittent every 8 hours    Cardiovascular:  tamsulosin 0.4milliGRAM(s) Oral at bedtime  metoprolol 12.5milliGRAM(s) Oral every 12 hours    Pulmonary:  ALBUTerol    0.083% 2.5milliGRAM(s) Nebulizer every 6 hours    Hematalogic:  heparin  Injectable 5000Unit(s) SubCutaneous every 8 hours  aspirin  chewable 81milliGRAM(s) Oral daily    Other:  pregabalin 50milliGRAM(s) Oral at bedtime  docusate sodium 100milliGRAM(s) Oral two times a day  insulin lispro (HumaLOG) corrective regimen sliding scale  SubCutaneous at bedtime  pantoprazole    Tablet 40milliGRAM(s) Oral before breakfast  simvastatin 20milliGRAM(s) Oral at bedtime  acetaminophen  Suppository 650milliGRAM(s) Rectal every 6 hours PRN  insulin lispro (HumaLOG) corrective regimen sliding scale  SubCutaneous three times a day before meals  dextrose 5%. 1000milliLiter(s) IV Continuous <Continuous>  sodium chloride 0.9%. 1000milliLiter(s) IV Continuous <Continuous>  sodium chloride 0.9% Bolus 500milliLiter(s) IV Bolus once  acetaminophen   Tablet. 650milliGRAM(s) Oral every 6 hours PRN  hydrocortisone 2.5% Rectal Cream 1Application(s) Rectal daily PRN    heparin  Injectable 5000Unit(s) SubCutaneous every 8 hours  aspirin  chewable 81milliGRAM(s) Oral daily  pregabalin 50milliGRAM(s) Oral at bedtime  docusate sodium 100milliGRAM(s) Oral two times a day  insulin lispro (HumaLOG) corrective regimen sliding scale  SubCutaneous at bedtime  pantoprazole    Tablet 40milliGRAM(s) Oral before breakfast  simvastatin 20milliGRAM(s) Oral at bedtime  tamsulosin 0.4milliGRAM(s) Oral at bedtime  acetaminophen  Suppository 650milliGRAM(s) Rectal every 6 hours PRN  metoprolol 12.5milliGRAM(s) Oral every 12 hours  piperacillin/tazobactam IVPB. 3.375Gram(s) IV Intermittent every 8 hours  ALBUTerol    0.083% 2.5milliGRAM(s) Nebulizer every 6 hours  insulin lispro (HumaLOG) corrective regimen sliding scale  SubCutaneous three times a day before meals  dextrose 5%. 1000milliLiter(s) IV Continuous <Continuous>  sodium chloride 0.9%. 1000milliLiter(s) IV Continuous <Continuous>  sodium chloride 0.9% Bolus 500milliLiter(s) IV Bolus once  acetaminophen   Tablet. 650milliGRAM(s) Oral every 6 hours PRN  hydrocortisone 2.5% Rectal Cream 1Application(s) Rectal daily PRN    Drug Dosing Weight  Height (cm): 147.3 (15 May 2017 07:08)  Weight (kg): 71 (15 May 2017 16:00)  BMI (kg/m2): 32.7 (15 May 2017 16:00)  BSA (m2): 1.64 (15 May 2017 16:00)    CENTRAL LINE: [ ] YES [ ] NO  LOCATION:   DATE INSERTED:  REMOVE: [ ] YES [ ] NO  EXPLAIN:    PRYOR: [ ] YES [ ] NO    DATE INSERTED:  REMOVE:  [ ] YES [ ] NO  EXPLAIN:    A-LINE:  [ ] YES [ ] NO  LOCATION:   DATE INSERTED:  REMOVE:  [ ] YES [ ] NO  EXPLAIN:    PMH -reviewed admission note, no change since admission  Heart faliure: acute [ ] chronic [ ] acute or chronic [ ] diastolic [ ] systolic [ ] combied systolic and diastolic[ ]  DAMIÁN: ATN[ ] renal medullary necrosis [ ] CKD I [ ]CKDII [ ]CKD III [ ]CKD IV [ ]CKD V [ ]Other pathological lesions [ ]  Abdominal Nutrition Status: malnutrition [ ] cachexia [ ] morbid obesity/BMI=40 [ ] Supplement ordered [___________]       Vital Signs Last 24 Hrs  T(C): 36.9, Max: 36.9 (05-20 @ 14:49)  T(F): 98.4, Max: 98.4 (05-20 @ 14:49)  HR: 80 (80 - 116)  BP: 110/55 (101/48 - 131/62)  BP(mean): --  RR: 18 (16 - 18)  SpO2: 95% (94% - 96%)  I & Os for 24h ending  @ 07:00  =============================================  IN: 4075 ml / OUT: 1100 ml / NET: 2975 ml        PHYSICAL EXAM:    GENERAL: [ ]NAD, [ ]well-groomed, [ ]well-developed  HEAD:  [ ]Atraumatic, [ ]Normocephalic  EYES: [ ]EOMI, [ ]PERRLA, [ ]conjunctiva and sclera clear  ENMT: [ ]No tonsillar erythema, exudates, or enlargement; [ ]Moist mucous membranes, [ ]Good dentition, [ ]No lesions  NECK: [ ]Supple, normal appearance, [ ]No JVD; [ ]Normal thyroid; [ ]Trachea midline  NERVOUS SYSTEM:  [ ]Alert & Oriented X3, [ ]Good concentration; [ ]Motor Strength 5/5 B/L upper and lower extremities; [ ]DTRs 2+ intact and symmetric  CHEST/LUNG: [ ]No chest deformity; [ ]Normal percussion bilaterally; [ ]No rales, rhonchi, wheezing   HEART: [ ]Regular rate and rhythm; [ ]No murmurs, rubs, or gallops  ABDOMEN: [ ]Soft, Nontender, Nondistended; [ ]Bowel sounds present  EXTREMITIES:  [ ]2+ Peripheral Pulses, [ ]No clubbing, cyanosis, or edema  LYMPH: [ ]No lymphadenopathy noted  SKIN: [ ]No rashes or lesions; [ ]Good capillary refill      LABS:  CBC Full  -  ( 19 May 2017 12:23 )  WBC Count : 15.9 K/uL  Hemoglobin : 9.5 g/dL  Hematocrit : 27.6 %  Platelet Count - Automated : 283 K/uL  Mean Cell Volume : 84.6 fl  Mean Cell Hemoglobin : 29.0 pg  Mean Cell Hemoglobin Concentration : 34.3 gm/dL  Auto Neutrophil # : x  Auto Lymphocyte # : x  Auto Monocyte # : x  Auto Eosinophil # : x  Auto Basophil # : x  Auto Neutrophil % : x  Auto Lymphocyte % : x  Auto Monocyte % : x  Auto Eosinophil % : x  Auto Basophil % : x    05-19    137  |  100  |  24<H>  ----------------------------<  221<H>  3.6   |  29  |  1.12    Ca    8.7      19 May 2017 07:08  Phos  3.4     05-19  Mg     2.4               Culture Results:   10,000 - 49,000 CFU/mL Enterococcus faecalis ( @ 17:46)  Culture Results:   No growth to date. ( @ 10:37)      RADIOLOGY & ADDITIONAL STUDIES REVIEWED:  ***    [ ]GOALS OF CARE DISCUSSION WITH PATIENT/FAMILY/PROXY:        IMPRESSION:    PAST MEDICAL & SURGICAL HISTORY:  Type 2 diabetes mellitus  Essential hypertension  DM (diabetes mellitus)  HTN (hypertension)  Fibromyalgia  History of appendectomy  No significant past surgical history   p/w post op left CEA    RECOMMENDATIONS:  · Assessment		  fevers unknown source   possible uti based on u/a but urine pryor is clear.  leukocytosis decreasing   plan -cont zosyn 3.375gms iv q8hrs  -son refused bronchodilators

## 2017-05-20 NOTE — PHYSICAL THERAPY INITIAL EVALUATION ADULT - PERTINENT HX OF CURRENT PROBLEM, REHAB EVAL
slurred speech
Brought by son for sudden onset of tongue heaviness, slurred speech. MRI of brain however was negative for acute infarct. Had CVA in January with no residual weakness.

## 2017-05-20 NOTE — PHYSICAL THERAPY INITIAL EVALUATION ADULT - IMPAIRMENTS CONTRIBUTING TO GAIT DEVIATIONS, PT EVAL
impaired motor control/decreased strength
decreased strength/decreased balance/decreased flexibility

## 2017-05-20 NOTE — PHYSICAL THERAPY INITIAL EVALUATION ADULT - GAIT DEVIATIONS NOTED, PT EVAL
steady gait
decreased stride length/decreased weight-shifting ability/decreased velocity of limb motion/decreased step length

## 2017-05-20 NOTE — PHYSICAL THERAPY INITIAL EVALUATION ADULT - ACTIVE RANGE OF MOTION EXAMINATION, REHAB EVAL
Left LE Active ROM was WFL (within functional limits)/Left UE Active ROM was WFL (within functional limits)
no Active ROM deficits were identified

## 2017-05-21 LAB
ANION GAP SERPL CALC-SCNC: 8 MMOL/L — SIGNIFICANT CHANGE UP (ref 5–17)
BUN SERPL-MCNC: 12 MG/DL — SIGNIFICANT CHANGE UP (ref 7–18)
CALCIUM SERPL-MCNC: 8.6 MG/DL — SIGNIFICANT CHANGE UP (ref 8.4–10.5)
CHLORIDE SERPL-SCNC: 108 MMOL/L — SIGNIFICANT CHANGE UP (ref 96–108)
CO2 SERPL-SCNC: 26 MMOL/L — SIGNIFICANT CHANGE UP (ref 22–31)
CREAT SERPL-MCNC: 0.82 MG/DL — SIGNIFICANT CHANGE UP (ref 0.5–1.3)
GLUCOSE SERPL-MCNC: 144 MG/DL — HIGH (ref 70–99)
HCT VFR BLD CALC: 23.8 % — LOW (ref 34.5–45)
HGB BLD-MCNC: 8.1 G/DL — LOW (ref 11.5–15.5)
MCHC RBC-ENTMCNC: 29 PG — SIGNIFICANT CHANGE UP (ref 27–34)
MCHC RBC-ENTMCNC: 34.3 GM/DL — SIGNIFICANT CHANGE UP (ref 32–36)
MCV RBC AUTO: 84.6 FL — SIGNIFICANT CHANGE UP (ref 80–100)
PLATELET # BLD AUTO: 281 K/UL — SIGNIFICANT CHANGE UP (ref 150–400)
POTASSIUM SERPL-MCNC: 3.6 MMOL/L — SIGNIFICANT CHANGE UP (ref 3.5–5.3)
POTASSIUM SERPL-SCNC: 3.6 MMOL/L — SIGNIFICANT CHANGE UP (ref 3.5–5.3)
RBC # BLD: 2.81 M/UL — LOW (ref 3.8–5.2)
RBC # FLD: 13.8 % — SIGNIFICANT CHANGE UP (ref 10.3–14.5)
SODIUM SERPL-SCNC: 142 MMOL/L — SIGNIFICANT CHANGE UP (ref 135–145)
WBC # BLD: 10.5 K/UL — SIGNIFICANT CHANGE UP (ref 3.8–10.5)
WBC # FLD AUTO: 10.5 K/UL — SIGNIFICANT CHANGE UP (ref 3.8–10.5)

## 2017-05-21 PROCEDURE — 99232 SBSQ HOSP IP/OBS MODERATE 35: CPT

## 2017-05-21 RX ADMIN — Medication 650 MILLIGRAM(S): at 02:50

## 2017-05-21 RX ADMIN — Medication 650 MILLIGRAM(S): at 02:08

## 2017-05-21 RX ADMIN — Medication 12.5 MILLIGRAM(S): at 05:58

## 2017-05-21 RX ADMIN — Medication 12.5 MILLIGRAM(S): at 18:21

## 2017-05-21 RX ADMIN — HEPARIN SODIUM 5000 UNIT(S): 5000 INJECTION INTRAVENOUS; SUBCUTANEOUS at 05:58

## 2017-05-21 RX ADMIN — HEPARIN SODIUM 5000 UNIT(S): 5000 INJECTION INTRAVENOUS; SUBCUTANEOUS at 22:23

## 2017-05-21 RX ADMIN — SIMVASTATIN 20 MILLIGRAM(S): 20 TABLET, FILM COATED ORAL at 22:23

## 2017-05-21 RX ADMIN — Medication 50 MILLIGRAM(S): at 22:26

## 2017-05-21 RX ADMIN — Medication 2: at 08:59

## 2017-05-21 RX ADMIN — NYSTATIN CREAM 1 APPLICATION(S): 100000 CREAM TOPICAL at 06:13

## 2017-05-21 RX ADMIN — PIPERACILLIN AND TAZOBACTAM 25 GRAM(S): 4; .5 INJECTION, POWDER, LYOPHILIZED, FOR SOLUTION INTRAVENOUS at 18:21

## 2017-05-21 RX ADMIN — Medication 2: at 11:57

## 2017-05-21 RX ADMIN — PIPERACILLIN AND TAZOBACTAM 25 GRAM(S): 4; .5 INJECTION, POWDER, LYOPHILIZED, FOR SOLUTION INTRAVENOUS at 22:23

## 2017-05-21 RX ADMIN — PANTOPRAZOLE SODIUM 40 MILLIGRAM(S): 20 TABLET, DELAYED RELEASE ORAL at 06:13

## 2017-05-21 RX ADMIN — Medication 81 MILLIGRAM(S): at 11:57

## 2017-05-21 RX ADMIN — Medication 2: at 18:20

## 2017-05-21 RX ADMIN — PIPERACILLIN AND TAZOBACTAM 25 GRAM(S): 4; .5 INJECTION, POWDER, LYOPHILIZED, FOR SOLUTION INTRAVENOUS at 05:59

## 2017-05-21 RX ADMIN — Medication 100 MILLIGRAM(S): at 05:58

## 2017-05-21 RX ADMIN — NYSTATIN CREAM 1 APPLICATION(S): 100000 CREAM TOPICAL at 18:23

## 2017-05-21 RX ADMIN — SODIUM CHLORIDE 75 MILLILITER(S): 9 INJECTION INTRAMUSCULAR; INTRAVENOUS; SUBCUTANEOUS at 06:15

## 2017-05-21 RX ADMIN — TAMSULOSIN HYDROCHLORIDE 0.4 MILLIGRAM(S): 0.4 CAPSULE ORAL at 22:23

## 2017-05-21 RX ADMIN — Medication 4: at 22:26

## 2017-05-21 NOTE — PROGRESS NOTE ADULT - SUBJECTIVE AND OBJECTIVE BOX
Patient is a 76y old  Female who presents with a chief complaint of Brought by son for sudden onset of tongue heaviness, slurred speech since 9:00 PM (12 May 2017 08:43) She is now post-op for left carotid endarterectomy.  Course has been complicated by SOB, urinary retention, fluid overload, fever, leukocytosis, and anemia.        INTERVAL HPI/OVERNIGHT EVENTS:  Now she feels week and tired.   Perianal pain, which she had yesterday, has resolved.     MEDICATIONS  (STANDING):  heparin  Injectable 5000Unit(s) SubCutaneous every 8 hours  aspirin  chewable 81milliGRAM(s) Oral daily  pregabalin 50milliGRAM(s) Oral at bedtime  docusate sodium 100milliGRAM(s) Oral two times a day  insulin lispro (HumaLOG) corrective regimen sliding scale  SubCutaneous at bedtime  pantoprazole    Tablet 40milliGRAM(s) Oral before breakfast  simvastatin 20milliGRAM(s) Oral at bedtime  tamsulosin 0.4milliGRAM(s) Oral at bedtime  metoprolol 12.5milliGRAM(s) Oral every 12 hours  piperacillin/tazobactam IVPB. 3.375Gram(s) IV Intermittent every 8 hours  insulin lispro (HumaLOG) corrective regimen sliding scale  SubCutaneous three times a day before meals  dextrose 5%. 1000milliLiter(s) IV Continuous <Continuous>  sodium chloride 0.9%. 1000milliLiter(s) IV Continuous <Continuous>  sodium chloride 0.9% Bolus 500milliLiter(s) IV Bolus once  nystatin Powder 1Application(s) Topical two times a day    MEDICATIONS  (PRN):  acetaminophen  Suppository 650milliGRAM(s) Rectal every 6 hours PRN For Temp greater than 38 C (100.4 F)  acetaminophen   Tablet. 650milliGRAM(s) Oral every 6 hours PRN Mild Pain (1 - 3)    Allergies    No Known Allergies    REVIEW OF SYSTEMS:      RESPIRATORY: No cough, wheezing, chills or hemoptysis; No shortness of breath  CARDIOVASCULAR: No chest pain, palpitations, dizziness, or leg swelling  GASTROINTESTINAL: No abdominal or epigastric pain. No nausea, vomiting, or hematemesis; No diarrhea or constipation. No melena or hematochezia.  GENITOURINARY: Jj was removed this AM.  She has not yet urinated.   NEUROLOGICAL: No headaches, memory loss, loss of strength, numbness, or tremors  SKIN: No itching, burning, rashes, or lesions   ENDOCRINE: No heat or cold intolerance; No hair loss  MUSCULOSKELETAL: No joint pain or swelling; No muscle, back, or extremity pain    Tired appearing 77 yo woman    Vital Signs Last 24 Hrs  T(C): 36.8, Max: 36.9 (05-20 @ 14:49)  T(F): 98.3, Max: 98.4 (05-20 @ 14:49)  HR: 89 (80 - 89)  BP: 138/78 (110/55 - 138/78)  BP(mean): --  RR: 16 (16 - 18)  SpO2: 100% (95% - 100%)    PHYSICAL EXAM:  GENERAL: NAD, well-groomed, well-developed  EYES: EOMI, PERRLA, conjunctiva and sclera clear  Neck:  fullness near surgical scar.  No stridor  CHEST/LUNG: Clear to percussion bilaterally; No rales, rhonchi, wheezing, or rubs  HEART: Regular rate and rhythm; No murmurs, rubs, or gallops  ABDOMEN: Soft, Nontender, Nondistended; Bowel sounds present  EXTREMITIES:  2+ Peripheral Pulses, No clubbing, cyanosis, or edema  NERVOUS SYSTEM:  Generalized weakness    LABS:                        8.1    10.5  )-----------( 281      ( 21 May 2017 06:27 )             23.8     05-21    142  |  108  |  12  ----------------------------<  144<H>  3.6   |  26  |  0.82    Ca    8.6      21 May 2017 06:27      CAPILLARY BLOOD GLUCOSE  180 (21 May 2017 11:39)  172 (21 May 2017 07:48)  162 (20 May 2017 20:24)  212 (20 May 2017 16:07)      RADIOLOGY & ADDITIONAL TESTS:    XRay:    CT Scan:    Imaging Personally Reviewed:  [ ] YES  [ ] NO    Consultant(s) Notes Reviewed:  [ ] YES  [ ] NO    Care Discussed with Consultants/Other Providers [ ] YES  [ ] NO

## 2017-05-21 NOTE — PROGRESS NOTE ADULT - SUBJECTIVE AND OBJECTIVE BOX
Time of Visit:1200 hrs  Patient seen and examined. she is sitting chair. son at bedside. c/c weakness    MEDICATIONS  (STANDING):  heparin  Injectable 5000Unit(s) SubCutaneous every 8 hours  aspirin  chewable 81milliGRAM(s) Oral daily  pregabalin 50milliGRAM(s) Oral at bedtime  docusate sodium 100milliGRAM(s) Oral two times a day  insulin lispro (HumaLOG) corrective regimen sliding scale  SubCutaneous at bedtime  pantoprazole    Tablet 40milliGRAM(s) Oral before breakfast  simvastatin 20milliGRAM(s) Oral at bedtime  tamsulosin 0.4milliGRAM(s) Oral at bedtime  metoprolol 12.5milliGRAM(s) Oral every 12 hours  piperacillin/tazobactam IVPB. 3.375Gram(s) IV Intermittent every 8 hours  insulin lispro (HumaLOG) corrective regimen sliding scale  SubCutaneous three times a day before meals  dextrose 5%. 1000milliLiter(s) IV Continuous <Continuous>  sodium chloride 0.9%. 1000milliLiter(s) IV Continuous <Continuous>  sodium chloride 0.9% Bolus 500milliLiter(s) IV Bolus once  nystatin Powder 1Application(s) Topical two times a day      MEDICATIONS  (PRN):  acetaminophen  Suppository 650milliGRAM(s) Rectal every 6 hours PRN For Temp greater than 38 C (100.4 F)  acetaminophen   Tablet. 650milliGRAM(s) Oral every 6 hours PRN Mild Pain (1 - 3)       Medications up to date at time of exam.      PHYSICAL EXAMINATION:  Patient has no new complaints.  GENERAL: The patient is a well-developed, well-nourished, in no apparent distress.     Vital Signs Last 24 Hrs  T(C): 36.8, Max: 36.9 (05-20 @ 14:49)  T(F): 98.3, Max: 98.4 (05-20 @ 14:49)  HR: 89 (80 - 89)  BP: 138/78 (110/55 - 138/78)  BP(mean): --  RR: 16 (16 - 18)  SpO2: 100% (95% - 100%)   (if applicable)    Chest Tube (if applicable)    HEENT: Head is normocephalic and atraumatic. Extraocular muscles are intact. Mucous membranes are moist.     NECK: Supple, no palpable adenopathy. left surgical site healing    LUNGS: Clear to auscultation, no wheezing, rales, or rhonchi.    HEART: Regular rate and rhythm without murmur.    ABDOMEN: Soft, nontender, and nondistended.  No hepatosplenomegaly is noted.    EXTREMITIES: Without any cyanosis, clubbing, rash, lesions or edema.    NEUROLOGIC: Awake, alert, oriented.     SKIN: Warm, dry, good turgor.      LABS:                        8.1    10.5  )-----------( 281      ( 21 May 2017 06:27 )             23.8     05-21    142  |  108  |  12  ----------------------------<  144<H>  3.6   |  26  |  0.82    Ca    8.6      21 May 2017 06:27                          MICROBIOLOGY: (if applicable)    RADIOLOGY & ADDITIONAL STUDIES:  EKG:   CXR:  ECHO:    IMPRESSION: 76y Female PAST MEDICAL & SURGICAL HISTORY:  Type 2 diabetes mellitus  Essential hypertension  DM (diabetes mellitus)  HTN (hypertension)  Fibromyalgia  History of appendectomy  No significant past surgical history   p/w post op left CEA. post op sob and weakness    RECOMMENDATIONS:  - antibx per ID  -physical therapy  -off bronchodilators as per son's request  -continue meds

## 2017-05-21 NOTE — PROGRESS NOTE ADULT - ASSESSMENT
fevers unknown source   blood cults neg   urine cultures shows 10-49 k enterococcus  leukocytosis - normalised  plan -cont zosyn 3.375gms iv q8hrs for now , will give till monday and then dc all abxs if no source found. fevers unknown source but have dissappeared  blood cults neg   urine cultures shows 10-49 k enterococcus  leukocytosis - normalised  plan -cont zosyn 3.375gms iv q8hrs for now , d/c all abxs tomorrow.

## 2017-05-21 NOTE — PROGRESS NOTE ADULT - SUBJECTIVE AND OBJECTIVE BOX
76y Female    Meds:  piperacillin/tazobactam IVPB. 3.375Gram(s) IV Intermittent every 8 hours    Allergies    No Known Allergies    Intolerances        VITALS:  Vital Signs Last 24 Hrs  T(C): 36.8, Max: 36.9 (05-20 @ 14:49)  T(F): 98.2, Max: 98.4 (05-20 @ 14:49)  HR: 82 (80 - 89)  BP: 131/63 (110/55 - 138/78)  BP(mean): --  RR: 14 (14 - 18)  SpO2: 99% (95% - 100%)    LABS/DIAGNOSTIC TESTS:                          8.1    10.5  )-----------( 281      ( 21 May 2017 06:27 )             23.8         05-21    142  |  108  |  12  ----------------------------<  144<H>  3.6   |  26  |  0.82    Ca    8.6      21 May 2017 06:27            CULTURES: .Urine Catheterized  05-18 @ 17:46   10,000 - 49,000 CFU/mL Enterococcus faecalis  --  Enterococcus faecalis      .Blood Blood  05-18 @ 10:37   No growth to date.  --  --            RADIOLOGY:      ROS:  [  ] UNABLE TO ELICIT 76y Female who is clinically well,she has no fevers or chills. her rectal pain is improving. she has no other complaints except leg weakness and a poor appetite.    Meds:  piperacillin/tazobactam IVPB. 3.375Gram(s) IV Intermittent every 8 hours    Allergies    No Known Allergies    Intolerances        VITALS:  Vital Signs Last 24 Hrs  T(C): 36.8, Max: 36.9 (05-20 @ 14:49)  T(F): 98.2, Max: 98.4 (05-20 @ 14:49)  HR: 82 (80 - 89)  BP: 131/63 (110/55 - 138/78)  BP(mean): --  RR: 14 (14 - 18)  SpO2: 99% (95% - 100%)    LABS/DIAGNOSTIC TESTS:                          8.1    10.5  )-----------( 281      ( 21 May 2017 06:27 )             23.8         05-21    142  |  108  |  12  ----------------------------<  144<H>  3.6   |  26  |  0.82    Ca    8.6      21 May 2017 06:27            CULTURES: .Urine Catheterized  05-18 @ 17:46   10,000 - 49,000 CFU/mL Enterococcus faecalis  --  Enterococcus faecalis      .Blood Blood  05-18 @ 10:37   No growth to date.  --  --            RADIOLOGY:      ROS:  [  ] UNABLE TO ELICIT

## 2017-05-21 NOTE — PROGRESS NOTE ADULT - SUBJECTIVE AND OBJECTIVE BOX
Patient seen and examined.   Time of visit:    MEDICATIONS  (STANDING):  heparin  Injectable 5000Unit(s) SubCutaneous every 8 hours  aspirin  chewable 81milliGRAM(s) Oral daily  pregabalin 50milliGRAM(s) Oral at bedtime  docusate sodium 100milliGRAM(s) Oral two times a day  insulin lispro (HumaLOG) corrective regimen sliding scale  SubCutaneous at bedtime  pantoprazole    Tablet 40milliGRAM(s) Oral before breakfast  simvastatin 20milliGRAM(s) Oral at bedtime  tamsulosin 0.4milliGRAM(s) Oral at bedtime  metoprolol 12.5milliGRAM(s) Oral every 12 hours  piperacillin/tazobactam IVPB. 3.375Gram(s) IV Intermittent every 8 hours  insulin lispro (HumaLOG) corrective regimen sliding scale  SubCutaneous three times a day before meals  dextrose 5%. 1000milliLiter(s) IV Continuous <Continuous>  sodium chloride 0.9%. 1000milliLiter(s) IV Continuous <Continuous>  sodium chloride 0.9% Bolus 500milliLiter(s) IV Bolus once  nystatin Powder 1Application(s) Topical two times a day      MEDICATIONS  (PRN):  acetaminophen  Suppository 650milliGRAM(s) Rectal every 6 hours PRN For Temp greater than 38 C (100.4 F)  acetaminophen   Tablet. 650milliGRAM(s) Oral every 6 hours PRN Mild Pain (1 - 3)   Medications up to date at time of exam.      PHYSICAL EXAMINATION:  Patient has no new complaints.  GENERAL: The patient is a well-developed, well-nourished, in no apparent distress.     Vital Signs Last 24 Hrs  T(C): 36.6, Max: 36.8 (05-21 @ 13:58)  T(F): 97.8, Max: 98.2 (05-21 @ 13:58)  HR: 88 (71 - 93)  BP: 135/67 (131/63 - 160/88)  BP(mean): --  RR: 16 (14 - 18)  SpO2: 97% (95% - 99%)   (if applicable)      HEENT: Head is normocephalic and atraumatic. Extraocular muscles are intact. Mucous membranes are moist.     NECK: Supple, no palpable adenopathy.    LUNGS: Clear to auscultation, no wheezing, rales, or rhonchi.    HEART: Regular rate and rhythm without murmur.    ABDOMEN: Soft, nontender, and nondistended.  No hepatosplenomegaly is noted.    EXTREMITIES: Without any cyanosis, clubbing, rash, lesions or edema.    NEUROLOGIC: Awake, alert, oriented.     SKIN: Warm, dry, good turgor.      LABS:                        8.1    10.5  )-----------( 281      ( 21 May 2017 06:27 )             23.8     05-21    142  |  108  |  12  ----------------------------<  144<H>  3.6   |  26  |  0.82    Ca    8.6      21 May 2017 06:27          MICROBIOLOGY: (if applicable)    RADIOLOGY & ADDITIONAL STUDIES:  EKG:   CXR:  ECHO:    IMPRESSION: 76y Female PAST MEDICAL & SURGICAL HISTORY:  Type 2 diabetes mellitus  Essential hypertension  DM (diabetes mellitus)  HTN (hypertension)  Fibromyalgia  History of appendectomy  No significant past surgical history    p/w s/p L CEA, + TIA    RECOMMENDATIONS:     - HTN controlled   - con't with antibiotics as per ID recommendations.    - plan of care discussed with son

## 2017-05-21 NOTE — PROGRESS NOTE ADULT - ASSESSMENT
Patient has persistent anemia, without evidence of acute blood loss.   She is fatigued.  No evidence of CHF.   Urinary retention is still a concern.   Hypertension and diabetes are moderately controlled.   Plan:  Transfuse one unit PRBC.            Bladder scan.  Follow urine output.             Discontinue zosyn.

## 2017-05-22 DIAGNOSIS — R33.9 RETENTION OF URINE, UNSPECIFIED: ICD-10-CM

## 2017-05-22 LAB
ANION GAP SERPL CALC-SCNC: 7 MMOL/L — SIGNIFICANT CHANGE UP (ref 5–17)
BUN SERPL-MCNC: 10 MG/DL — SIGNIFICANT CHANGE UP (ref 7–18)
CALCIUM SERPL-MCNC: 8.8 MG/DL — SIGNIFICANT CHANGE UP (ref 8.4–10.5)
CHLORIDE SERPL-SCNC: 104 MMOL/L — SIGNIFICANT CHANGE UP (ref 96–108)
CO2 SERPL-SCNC: 29 MMOL/L — SIGNIFICANT CHANGE UP (ref 22–31)
CREAT SERPL-MCNC: 0.94 MG/DL — SIGNIFICANT CHANGE UP (ref 0.5–1.3)
GLUCOSE SERPL-MCNC: 196 MG/DL — HIGH (ref 70–99)
HCT VFR BLD CALC: 29.8 % — LOW (ref 34.5–45)
HGB BLD-MCNC: 10.1 G/DL — LOW (ref 11.5–15.5)
MCHC RBC-ENTMCNC: 28.9 PG — SIGNIFICANT CHANGE UP (ref 27–34)
MCHC RBC-ENTMCNC: 34 GM/DL — SIGNIFICANT CHANGE UP (ref 32–36)
MCV RBC AUTO: 85.2 FL — SIGNIFICANT CHANGE UP (ref 80–100)
OB PNL STL: NEGATIVE — SIGNIFICANT CHANGE UP
PLATELET # BLD AUTO: 337 K/UL — SIGNIFICANT CHANGE UP (ref 150–400)
POTASSIUM SERPL-MCNC: 3.8 MMOL/L — SIGNIFICANT CHANGE UP (ref 3.5–5.3)
POTASSIUM SERPL-SCNC: 3.8 MMOL/L — SIGNIFICANT CHANGE UP (ref 3.5–5.3)
RBC # BLD: 3.49 M/UL — LOW (ref 3.8–5.2)
RBC # FLD: 14.2 % — SIGNIFICANT CHANGE UP (ref 10.3–14.5)
SODIUM SERPL-SCNC: 140 MMOL/L — SIGNIFICANT CHANGE UP (ref 135–145)
WBC # BLD: 10.6 K/UL — HIGH (ref 3.8–10.5)
WBC # FLD AUTO: 10.6 K/UL — HIGH (ref 3.8–10.5)

## 2017-05-22 PROCEDURE — 99232 SBSQ HOSP IP/OBS MODERATE 35: CPT

## 2017-05-22 RX ORDER — SIMVASTATIN 20 MG/1
20 TABLET, FILM COATED ORAL AT BEDTIME
Qty: 0 | Refills: 0 | Status: DISCONTINUED | OUTPATIENT
Start: 2017-05-25 | End: 2017-05-24

## 2017-05-22 RX ORDER — METOPROLOL TARTRATE 50 MG
12.5 TABLET ORAL DAILY
Qty: 0 | Refills: 0 | Status: COMPLETED | OUTPATIENT
Start: 2017-05-23 | End: 2017-05-23

## 2017-05-22 RX ORDER — FUROSEMIDE 40 MG
20 TABLET ORAL ONCE
Qty: 0 | Refills: 0 | Status: COMPLETED | OUTPATIENT
Start: 2017-05-22 | End: 2017-05-22

## 2017-05-22 RX ORDER — LOSARTAN POTASSIUM 100 MG/1
25 TABLET, FILM COATED ORAL DAILY
Qty: 0 | Refills: 0 | Status: DISCONTINUED | OUTPATIENT
Start: 2017-05-23 | End: 2017-05-24

## 2017-05-22 RX ADMIN — PANTOPRAZOLE SODIUM 40 MILLIGRAM(S): 20 TABLET, DELAYED RELEASE ORAL at 05:15

## 2017-05-22 RX ADMIN — Medication 12.5 MILLIGRAM(S): at 05:16

## 2017-05-22 RX ADMIN — NYSTATIN CREAM 1 APPLICATION(S): 100000 CREAM TOPICAL at 17:50

## 2017-05-22 RX ADMIN — NYSTATIN CREAM 1 APPLICATION(S): 100000 CREAM TOPICAL at 05:20

## 2017-05-22 RX ADMIN — Medication 2: at 17:49

## 2017-05-22 RX ADMIN — Medication 20 MILLIGRAM(S): at 14:24

## 2017-05-22 RX ADMIN — TAMSULOSIN HYDROCHLORIDE 0.4 MILLIGRAM(S): 0.4 CAPSULE ORAL at 22:28

## 2017-05-22 RX ADMIN — Medication 4: at 12:00

## 2017-05-22 RX ADMIN — Medication 81 MILLIGRAM(S): at 12:01

## 2017-05-22 RX ADMIN — PIPERACILLIN AND TAZOBACTAM 25 GRAM(S): 4; .5 INJECTION, POWDER, LYOPHILIZED, FOR SOLUTION INTRAVENOUS at 12:01

## 2017-05-22 RX ADMIN — Medication 100 MILLIGRAM(S): at 05:20

## 2017-05-22 RX ADMIN — Medication 650 MILLIGRAM(S): at 06:31

## 2017-05-22 RX ADMIN — Medication 650 MILLIGRAM(S): at 05:16

## 2017-05-22 RX ADMIN — Medication 2: at 08:14

## 2017-05-22 RX ADMIN — PIPERACILLIN AND TAZOBACTAM 25 GRAM(S): 4; .5 INJECTION, POWDER, LYOPHILIZED, FOR SOLUTION INTRAVENOUS at 05:14

## 2017-05-22 RX ADMIN — HEPARIN SODIUM 5000 UNIT(S): 5000 INJECTION INTRAVENOUS; SUBCUTANEOUS at 22:28

## 2017-05-22 RX ADMIN — HEPARIN SODIUM 5000 UNIT(S): 5000 INJECTION INTRAVENOUS; SUBCUTANEOUS at 05:20

## 2017-05-22 NOTE — PROGRESS NOTE ADULT - SUBJECTIVE AND OBJECTIVE BOX
Patient seen and examined.   Time of visit:1300    MEDICATIONS  (STANDING):  heparin  Injectable 5000Unit(s) SubCutaneous every 8 hours  aspirin  chewable 81milliGRAM(s) Oral daily  pregabalin 50milliGRAM(s) Oral at bedtime  docusate sodium 100milliGRAM(s) Oral two times a day  insulin lispro (HumaLOG) corrective regimen sliding scale  SubCutaneous at bedtime  pantoprazole    Tablet 40milliGRAM(s) Oral before breakfast  simvastatin 20milliGRAM(s) Oral at bedtime  tamsulosin 0.4milliGRAM(s) Oral at bedtime  metoprolol 12.5milliGRAM(s) Oral every 12 hours  piperacillin/tazobactam IVPB. 3.375Gram(s) IV Intermittent every 8 hours  insulin lispro (HumaLOG) corrective regimen sliding scale  SubCutaneous three times a day before meals  dextrose 5%. 1000milliLiter(s) IV Continuous <Continuous>  sodium chloride 0.9%. 1000milliLiter(s) IV Continuous <Continuous>  sodium chloride 0.9% Bolus 500milliLiter(s) IV Bolus once  nystatin Powder 1Application(s) Topical two times a day      MEDICATIONS  (PRN):  acetaminophen  Suppository 650milliGRAM(s) Rectal every 6 hours PRN For Temp greater than 38 C (100.4 F)  acetaminophen   Tablet. 650milliGRAM(s) Oral every 6 hours PRN Mild Pain (1 - 3)   Medications up to date at time of exam.      PHYSICAL EXAMINATION:  Patient has no new complaints.  GENERAL: The patient is a well-developed, well-nourished, in no apparent distress.     Vital Signs Last 24 Hrs  T(C): 36.6, Max: 36.8 (05-21 @ 13:58)  T(F): 97.8, Max: 98.2 (05-21 @ 13:58)  HR: 88 (71 - 93)  BP: 135/67 (131/63 - 160/88)  BP(mean): --  RR: 16 (14 - 18)  SpO2: 97% (95% - 99%)   (if applicable)      HEENT: Head is normocephalic and atraumatic.     NECK: Supple, no palpable adenopathy, + steri strips    LUNGS: Clear to auscultation, no wheezing, + mild rales, or rhonchi.    HEART: Regular rate and rhythm without murmur.    ABDOMEN: Soft, nontender, and nondistended.  No hepatosplenomegaly is noted.    EXTREMITIES: Without any cyanosis, clubbing, rash, lesions or edema.    NEUROLOGIC: Awake, alert.    SKIN: Warm, dry, good turgor.      LABS:                        10.1   10.6  )-----------( 337      ( 22 May 2017 12:41 )             29.8     05-22    140  |  104  |  10  ----------------------------<  196<H>  3.8   |  29  |  0.94    Ca    8.8      22 May 2017 12:41              MICROBIOLOGY: (if applicable)    RADIOLOGY & ADDITIONAL STUDIES:  EKG:   CXR:  ECHO:    IMPRESSION: 76y Female PAST MEDICAL & SURGICAL HISTORY:  Type 2 diabetes mellitus  Essential hypertension  DM (diabetes mellitus)  HTN (hypertension)  Fibromyalgia  History of appendectomy  No significant past surgical history       p/w TIA, s/p L CEA on 5/15/2017. Son is reporting patient is becoming more confused.     RECOMMENDATIONS:     - can taper metoprolol, 12.5mg daily x 1 more day, then start losartan 25mg daily.   - pt s/p 1 unit PRBCs yesterday.   - DVT and GI prophylaxis. Patient seen and examined.   Time of visit:1300    MEDICATIONS  (STANDING):  heparin  Injectable 5000Unit(s) SubCutaneous every 8 hours  aspirin  chewable 81milliGRAM(s) Oral daily  pregabalin 50milliGRAM(s) Oral at bedtime  docusate sodium 100milliGRAM(s) Oral two times a day  insulin lispro (HumaLOG) corrective regimen sliding scale  SubCutaneous at bedtime  pantoprazole    Tablet 40milliGRAM(s) Oral before breakfast  simvastatin 20milliGRAM(s) Oral at bedtime  tamsulosin 0.4milliGRAM(s) Oral at bedtime  metoprolol 12.5milliGRAM(s) Oral every 12 hours  piperacillin/tazobactam IVPB. 3.375Gram(s) IV Intermittent every 8 hours  insulin lispro (HumaLOG) corrective regimen sliding scale  SubCutaneous three times a day before meals  dextrose 5%. 1000milliLiter(s) IV Continuous <Continuous>  sodium chloride 0.9%. 1000milliLiter(s) IV Continuous <Continuous>  sodium chloride 0.9% Bolus 500milliLiter(s) IV Bolus once  nystatin Powder 1Application(s) Topical two times a day      MEDICATIONS  (PRN):  acetaminophen  Suppository 650milliGRAM(s) Rectal every 6 hours PRN For Temp greater than 38 C (100.4 F)  acetaminophen   Tablet. 650milliGRAM(s) Oral every 6 hours PRN Mild Pain (1 - 3)   Medications up to date at time of exam.      PHYSICAL EXAMINATION:  Patient has no new complaints.  GENERAL: The patient is a well-developed, well-nourished, in no apparent distress.     Vital Signs Last 24 Hrs  T(C): 36.6, Max: 36.8 (05-21 @ 13:58)  T(F): 97.8, Max: 98.2 (05-21 @ 13:58)  HR: 88 (71 - 93)  BP: 135/67 (131/63 - 160/88)  BP(mean): --  RR: 16 (14 - 18)  SpO2: 97% (95% - 99%)   (if applicable)      HEENT: Head is normocephalic and atraumatic.     NECK: Supple, no palpable adenopathy, + steri strips    LUNGS: Clear to auscultation, no wheezing, + mild rales, or rhonchi.    HEART: Regular rate and rhythm without murmur.    ABDOMEN: Soft, nontender, and nondistended.  No hepatosplenomegaly is noted.    EXTREMITIES: Without any cyanosis, clubbing, rash, lesions or edema.    NEUROLOGIC: Awake, alert.    SKIN: Warm, dry, good turgor.      LABS:                        10.1   10.6  )-----------( 337      ( 22 May 2017 12:41 )             29.8     05-22    140  |  104  |  10  ----------------------------<  196<H>  3.8   |  29  |  0.94    Ca    8.8      22 May 2017 12:41              MICROBIOLOGY: (if applicable)    RADIOLOGY & ADDITIONAL STUDIES:  EKG:   CXR:  ECHO:    IMPRESSION: 76y Female PAST MEDICAL & SURGICAL HISTORY:  Type 2 diabetes mellitus  Essential hypertension  DM (diabetes mellitus)  HTN (hypertension)  Fibromyalgia  History of appendectomy  No significant past surgical history       p/w TIA, s/p L CEA on 5/15/2017. Son is reporting patient is becoming more confused.     RECOMMENDATIONS:     - can taper metoprolol, 12.5mg daily x 1 more day, then start losartan 25mg daily.   - can discontinue statin for now, restart in 3 - 4 days.   - pt s/p 1 unit PRBCs yesterday.   - DVT and GI prophylaxis.

## 2017-05-22 NOTE — PROGRESS NOTE ADULT - ASSESSMENT
Patient came to the hospital with tongue heaviness, resolved.  Dx, TIA.  S/P left carotid endarterectomy.  CHF-patient now has rales, may have fluid overload after tx 1 unit PRBC  Urinary retention remains a problem.   Fever, leukocytosis, have resolved.  Unclear what was the source.   Anemia, s/p tx 1 unit PRBC.  No evidence of bleeding.   Rectal pain, resolved.   Hypertension, moderate control     Plan:    Lasix, 20 mg iv push, given  Neurology follow up  Urology consultation  Discontinue zosyn.  I have discussed this plan with the NP, cardiology NP, and with the patient.

## 2017-05-22 NOTE — PROGRESS NOTE ADULT - ASSESSMENT
fevers unknown source but have dissappeared  blood cults neg   urine cultures shows 10-49 k enterococcus  leukocytosis - normalised  plan -cont zosyn 3.375gms iv q8hrs for now , d/c all abxs tomorrow. s/p fevers  blood cults neg   urine cultures shows 10-49 k enterococcus  leukocytosis - mild  plan - off all abxs

## 2017-05-22 NOTE — DIETITIAN INITIAL EVALUATION ADULT. - OTHER INFO
Pt visited. OOB to chair.Pt Reports H/O  DM x > 20 yrs. Pt visited. OOB to chair. Pt Reports H/O  DM x > 20   years. NIDDM Pt is complance to the diet.Po intake is Fair d/t dislikes Hospital Food..NO WEIGHT LOSS REPORTED.FOOD CHOICES OBTAINED. NKDA REPORTED.NO CHEWING OR SWALLOWDIFFICULTY REPORTED..

## 2017-05-22 NOTE — PROGRESS NOTE ADULT - NEUROLOGICAL DETAILS
strength decreased/generalised
generalised weakness/alert and oriented x 3
strength decreased/generalised

## 2017-05-22 NOTE — PROGRESS NOTE ADULT - SUBJECTIVE AND OBJECTIVE BOX
63y Female    Meds:  erythromycin   IVPB 230milliGRAM(s) IV Intermittent every 8 hours    Allergies    penicillin (Unknown)    Intolerances        VITALS:  Vital Signs Last 24 Hrs  T(C): 36.3, Max: 37.4 (05-21 @ 21:01)  T(F): 97.4, Max: 99.4 (05-21 @ 21:01)  HR: 97 (92 - 101)  BP: 125/72 (125/72 - 147/86)  BP(mean): --  RR: 18 (18 - 19)  SpO2: 96% (96% - 98%)    LABS/DIAGNOSTIC TESTS:                          9.7    12.4  )-----------( 383      ( 22 May 2017 06:52 )             29.8         05-22    139  |  105  |  4<L>  ----------------------------<  92  3.3<L>   |  25  |  0.21<L>    Ca    8.0<L>      22 May 2017 06:52  Phos  1.2     05-22  Mg     2.0     05-22            CULTURES: .Blood Blood-Peripheral  05-19 @ 17:21   No growth to date.  --  --      .Blood Blood-Peripheral  05-19 @ 15:21   No growth to date.  --  --      .Blood Blood  05-12 @ 00:09   No growth at 5 days.  --  --      .Urine Clean Catch (Midstream)  05-11 @ 11:09   <10,000 CFU/ml Normal Urogenital demetrice present  --  --      .Blood Blood-Peripheral  05-10 @ 23:37   No growth at 5 days.  --  --      .Blood Blood-Peripheral  05-10 @ 23:32   No growth at 5 days.  --  --      .Blood Blood-Peripheral  04-25 @ 00:23   No growth at 5 days.  --  --      .Urine Catheterized  04-24 @ 23:50   No growth  --  --      .Sputum Sputum / trap  02-27 @ 10:18   Moderate Pseudomonas aeruginosa (Carbapenem Resistant)  Normal Respiratory Demetrice present  --  Pseudomonas aeruginosa (multi drug resistant)      .Blood Blood  02-25 @ 17:38   No growth at 5 days.  --  --      .Blood Blood  02-25 @ 17:37   No growth at 5 days.  --  --      .Urine Catheterized  02-25 @ 16:43   No growth  --  --            RADIOLOGY:      ROS:  [  ] UNABLE TO ELICIT 63y Female who is clinically improving , she has no fevers,chills or diarrhea. she is forgetful though mostly oriented.her abxs were stopped today.    Meds:  erythromycin   IVPB 230milliGRAM(s) IV Intermittent every 8 hours    Allergies    penicillin (Unknown)    Intolerances        VITALS:  Vital Signs Last 24 Hrs  T(C): 36.3, Max: 37.4 (05-21 @ 21:01)  T(F): 97.4, Max: 99.4 (05-21 @ 21:01)  HR: 97 (92 - 101)  BP: 125/72 (125/72 - 147/86)  BP(mean): --  RR: 18 (18 - 19)  SpO2: 96% (96% - 98%)    LABS/DIAGNOSTIC TESTS:                          9.7    12.4  )-----------( 383      ( 22 May 2017 06:52 )             29.8         05-22    139  |  105  |  4<L>  ----------------------------<  92  3.3<L>   |  25  |  0.21<L>    Ca    8.0<L>      22 May 2017 06:52  Phos  1.2     05-22  Mg     2.0     05-22            CULTURES: .Blood Blood-Peripheral  05-19 @ 17:21   No growth to date.  --  --      .Blood Blood-Peripheral  05-19 @ 15:21   No growth to date.  --  --      .Blood Blood  05-12 @ 00:09   No growth at 5 days.  --  --      .Urine Clean Catch (Midstream)  05-11 @ 11:09   <10,000 CFU/ml Normal Urogenital demetrice present  --  --      .Blood Blood-Peripheral  05-10 @ 23:37   No growth at 5 days.  --  --      .Blood Blood-Peripheral  05-10 @ 23:32   No growth at 5 days.  --  --      .Blood Blood-Peripheral  04-25 @ 00:23   No growth at 5 days.  --  --      .Urine Catheterized  04-24 @ 23:50   No growth  --  --      .Sputum Sputum / trap  02-27 @ 10:18   Moderate Pseudomonas aeruginosa (Carbapenem Resistant)  Normal Respiratory Demetrice present  --  Pseudomonas aeruginosa (multi drug resistant)      .Blood Blood  02-25 @ 17:38   No growth at 5 days.  --  --      .Blood Blood  02-25 @ 17:37   No growth at 5 days.  --  --      .Urine Catheterized  02-25 @ 16:43   No growth  --  --            RADIOLOGY:      ROS:  [  ] UNABLE TO ELICIT

## 2017-05-22 NOTE — PROGRESS NOTE ADULT - SUBJECTIVE AND OBJECTIVE BOX
Patient is a 76y old  Female who presents with a chief complaint of Brought by son for sudden onset of tongue heaviness, slurred speech since 9:00 PM (12 May 2017 08:43).  She subsequently had a left carotid endarterectomy.  Post op course was complicated by SOB, fluid overload, urinary retention, fever, and anemia, all of which are resolving.       INTERVAL HPI/OVERNIGHT EVENTS:  Patient's son states that she has decrease in her memory over the last day.       MEDICATIONS  (STANDING):  heparin  Injectable 5000Unit(s) SubCutaneous every 8 hours  aspirin  chewable 81milliGRAM(s) Oral daily  pregabalin 50milliGRAM(s) Oral at bedtime  docusate sodium 100milliGRAM(s) Oral two times a day  insulin lispro (HumaLOG) corrective regimen sliding scale  SubCutaneous at bedtime  pantoprazole    Tablet 40milliGRAM(s) Oral before breakfast  tamsulosin 0.4milliGRAM(s) Oral at bedtime  insulin lispro (HumaLOG) corrective regimen sliding scale  SubCutaneous three times a day before meals  dextrose 5%. 1000milliLiter(s) IV Continuous <Continuous>  sodium chloride 0.9%. 1000milliLiter(s) IV Continuous <Continuous>  sodium chloride 0.9% Bolus 500milliLiter(s) IV Bolus once  nystatin Powder 1Application(s) Topical two times a day    MEDICATIONS  (PRN):  acetaminophen  Suppository 650milliGRAM(s) Rectal every 6 hours PRN For Temp greater than 38 C (100.4 F)  acetaminophen   Tablet. 650milliGRAM(s) Oral every 6 hours PRN Mild Pain (1 - 3)    Allergies    No Known Allergies      REVIEW OF SYSTEMS:  CONSTITUTIONAL: No fever,  RESPIRATORY: No cough, wheezing, chills or hemoptysis; She is short of breath.  CARDIOVASCULAR: No chest pain, palpitations, dizziness, or leg swelling  GASTROINTESTINAL: No abdominal or epigastric pain. No nausea, vomiting, or hematemesis; No diarrhea or constipation. No melena or hematochezia.  GENITOURINARY: She still had post-void residual of 300 cc.  Jj was replaced.   NEUROLOGICAL: No headaches; patient c/o her memory is not as good as it was.       Alert, cooperative 77 yo F in NAD      Vital Signs Last 24 Hrs  T(C): 37.1, Max: 37.1 (05-22 @ 14:40)  T(F): 98.8, Max: 98.8 (05-22 @ 14:40)  HR: 79 (71 - 93)  BP: 151/69 (135/67 - 174/92)  BP(mean): --  RR: 16 (14 - 18)  SpO2: 99% (95% - 99%)      EYES: EOMI, PERRLA, conjunctiva and sclera clear  SKIN: No rashes or lesions  CHEST/LUNG: bilateral basilar rales  HEART: Regular rate and rhythm; No murmurs, rubs, or gallops  ABDOMEN: Soft, Nontender, Nondistended; Bowel sounds present  EXTREMITIES:  2+ Peripheral Pulses, No clubbing, cyanosis, or edema  LYMPH: No lymphadenopathy noted  NERVOUS SYSTEM:  Alert & Oriented X3,  Motor Strength 5/5 B/L upper and lower extremities;     LABS:                        10.1   10.6  )-----------( 337      ( 22 May 2017 12:41 )             29.8     05-22    140  |  104  |  10  ----------------------------<  196<H>  3.8   |  29  |  0.94    Ca    8.8      22 May 2017 12:41          CAPILLARY BLOOD GLUCOSE  193 (22 May 2017 16:02)  235 (22 May 2017 11:45)  189 (22 May 2017 07:41)  228 (21 May 2017 21:11)

## 2017-05-22 NOTE — PROGRESS NOTE ADULT - SUBJECTIVE AND OBJECTIVE BOX
Surgery POD #7  s/p left CEA 5/15  Pt seen at bedside, sitting in chair, denies pain, no headaches, no dizziness, no n/v, tolerating regular diet.    vitals:  T(C): 36.6, Max: 36.8 (05-21 @ 13:58)  T(F): 97.8, Max: 98.2 (05-21 @ 13:58)  HR: 88 (71 - 93)  BP: 135/67 (131/63 - 160/88)  BP(mean): --  ABP: --  ABP(mean): --  RR: 16 (14 - 18)  SpO2: 97% (95% - 99%)    left neck area c/d/i, steri strips in place, no bleeding, non tender    krueger: 1400mL

## 2017-05-22 NOTE — PROGRESS NOTE ADULT - PROBLEM SELECTOR PROBLEM 1
TIA (transient ischemic attack)
Sepsis, due to unspecified organism
TIA (transient ischemic attack)
Carotid artery stenosis, unilateral, left
TIA (transient ischemic attack)
Carotid artery stenosis, unilateral, left
TIA (transient ischemic attack)
Sepsis, due to unspecified organism

## 2017-05-22 NOTE — CONSULT NOTE ADULT - GASTROINTESTINAL DETAILS
no masses palpable/no guarding/soft/bowel sounds normal/no rigidity/no organomegaly/no rebound tenderness/no distention
no masses palpable/soft/no distention/no rebound tenderness/nontender/no guarding

## 2017-05-22 NOTE — CONSULT NOTE ADULT - PROBLEM SELECTOR RECOMMENDATION 9
Pt to benefit from Urodynamic Studies, this can be done as outpatient   rec rpt ucx   c/w krueger catheter, pt to be dc w/ krueger   c/w flomax   pt to follow up with Dr James as outpatient   care as per medicine  d/w Dr James and agrees

## 2017-05-22 NOTE — PROGRESS NOTE ADULT - SUBJECTIVE AND OBJECTIVE BOX
Time of Visit:1300  Patient seen and examined.no sob     MEDICATIONS  (STANDING):  heparin  Injectable 5000Unit(s) SubCutaneous every 8 hours  aspirin  chewable 81milliGRAM(s) Oral daily  pregabalin 50milliGRAM(s) Oral at bedtime  docusate sodium 100milliGRAM(s) Oral two times a day  insulin lispro (HumaLOG) corrective regimen sliding scale  SubCutaneous at bedtime  pantoprazole    Tablet 40milliGRAM(s) Oral before breakfast  tamsulosin 0.4milliGRAM(s) Oral at bedtime  insulin lispro (HumaLOG) corrective regimen sliding scale  SubCutaneous three times a day before meals  dextrose 5%. 1000milliLiter(s) IV Continuous <Continuous>  sodium chloride 0.9%. 1000milliLiter(s) IV Continuous <Continuous>  sodium chloride 0.9% Bolus 500milliLiter(s) IV Bolus once  nystatin Powder 1Application(s) Topical two times a day      MEDICATIONS  (PRN):  acetaminophen  Suppository 650milliGRAM(s) Rectal every 6 hours PRN For Temp greater than 38 C (100.4 F)  acetaminophen   Tablet. 650milliGRAM(s) Oral every 6 hours PRN Mild Pain (1 - 3)       Medications up to date at time of exam.      PHYSICAL EXAMINATION:  Patient has no new complaints.  GENERAL: The patient is a well-developed, well-nourished, in no apparent distress.     Vital Signs Last 24 Hrs  T(C): 37.1, Max: 37.1 (05-22 @ 14:40)  T(F): 98.8, Max: 98.8 (05-22 @ 14:40)  HR: 79 (79 - 93)  BP: 151/69 (135/67 - 174/92)  BP(mean): --  RR: 16 (16 - 18)  SpO2: 99% (95% - 99%)   (if applicable)    Chest Tube (if applicable)    HEENT: Head is normocephalic and atraumatic. Extraocular muscles are intact. Mucous membranes are moist.     NECK: Supple, no palpable adenopathy.    LUNGS: Clear to auscultation, no wheezing, rales, or rhonchi.    HEART: Regular rate and rhythm without murmur.    ABDOMEN: Soft, nontender, and nondistended.  No hepatosplenomegaly is noted.    EXTREMITIES: Without any cyanosis, clubbing, rash, lesions or edema.    NEUROLOGIC: Awake, alert, oriented.     SKIN: Warm, dry, good turgor.      LABS:                        10.1   10.6  )-----------( 337      ( 22 May 2017 12:41 )             29.8     05-22    140  |  104  |  10  ----------------------------<  196<H>  3.8   |  29  |  0.94    Ca    8.8      22 May 2017 12:41                          MICROBIOLOGY: (if applicable)    RADIOLOGY & ADDITIONAL STUDIES:  EKG:   CXR:  ECHO:    IMPRESSION: 76y Female PAST MEDICAL & SURGICAL HISTORY:  Type 2 diabetes mellitus  Essential hypertension  DM (diabetes mellitus)  HTN (hypertension)  Fibromyalgia  History of appendectomy  No significant past surgical history   p/w p/w TIA, s/p L CEA on 5/15/2017. Son is reporting patient is becoming more confused.     RECOMMENDATIONS:     - med per cardiology. Son is refusing certain meds stated it is contrubuting to confusion.   - pt s/p 1 unit PRBCs yesterday.   - DVT and GI prophylaxis.       RECOMMENDATIONS:

## 2017-05-23 LAB
CULTURE RESULTS: SIGNIFICANT CHANGE UP
HCT VFR BLD CALC: 32.5 % — LOW (ref 34.5–45)
HGB BLD-MCNC: 11.2 G/DL — LOW (ref 11.5–15.5)
MCHC RBC-ENTMCNC: 29.1 PG — SIGNIFICANT CHANGE UP (ref 27–34)
MCHC RBC-ENTMCNC: 34.6 GM/DL — SIGNIFICANT CHANGE UP (ref 32–36)
MCV RBC AUTO: 84.1 FL — SIGNIFICANT CHANGE UP (ref 80–100)
PLATELET # BLD AUTO: 366 K/UL — SIGNIFICANT CHANGE UP (ref 150–400)
RBC # BLD: 3.86 M/UL — SIGNIFICANT CHANGE UP (ref 3.8–5.2)
RBC # FLD: 14.1 % — SIGNIFICANT CHANGE UP (ref 10.3–14.5)
SPECIMEN SOURCE: SIGNIFICANT CHANGE UP
WBC # BLD: 9.9 K/UL — SIGNIFICANT CHANGE UP (ref 3.8–10.5)
WBC # FLD AUTO: 9.9 K/UL — SIGNIFICANT CHANGE UP (ref 3.8–10.5)

## 2017-05-23 PROCEDURE — 76775 US EXAM ABDO BACK WALL LIM: CPT | Mod: 26

## 2017-05-23 PROCEDURE — 99233 SBSQ HOSP IP/OBS HIGH 50: CPT

## 2017-05-23 PROCEDURE — 99232 SBSQ HOSP IP/OBS MODERATE 35: CPT

## 2017-05-23 PROCEDURE — 70551 MRI BRAIN STEM W/O DYE: CPT | Mod: 26

## 2017-05-23 PROCEDURE — 76770 US EXAM ABDO BACK WALL COMP: CPT | Mod: 26

## 2017-05-23 RX ADMIN — HEPARIN SODIUM 5000 UNIT(S): 5000 INJECTION INTRAVENOUS; SUBCUTANEOUS at 05:18

## 2017-05-23 RX ADMIN — NYSTATIN CREAM 1 APPLICATION(S): 100000 CREAM TOPICAL at 17:43

## 2017-05-23 RX ADMIN — TAMSULOSIN HYDROCHLORIDE 0.4 MILLIGRAM(S): 0.4 CAPSULE ORAL at 21:02

## 2017-05-23 RX ADMIN — HEPARIN SODIUM 5000 UNIT(S): 5000 INJECTION INTRAVENOUS; SUBCUTANEOUS at 21:02

## 2017-05-23 RX ADMIN — PANTOPRAZOLE SODIUM 40 MILLIGRAM(S): 20 TABLET, DELAYED RELEASE ORAL at 07:55

## 2017-05-23 RX ADMIN — Medication 2: at 07:55

## 2017-05-23 RX ADMIN — Medication 100 MILLIGRAM(S): at 05:18

## 2017-05-23 RX ADMIN — Medication 81 MILLIGRAM(S): at 11:39

## 2017-05-23 RX ADMIN — Medication 12.5 MILLIGRAM(S): at 05:18

## 2017-05-23 RX ADMIN — Medication 2: at 11:39

## 2017-05-23 RX ADMIN — Medication 4: at 17:43

## 2017-05-23 RX ADMIN — NYSTATIN CREAM 1 APPLICATION(S): 100000 CREAM TOPICAL at 05:19

## 2017-05-23 RX ADMIN — LOSARTAN POTASSIUM 25 MILLIGRAM(S): 100 TABLET, FILM COATED ORAL at 05:19

## 2017-05-23 NOTE — PROGRESS NOTE ADULT - ASSESSMENT
77yo Female s/p Carotid Endarterectomy    BRBPR  Pt asymptomatic  Attending informed, stat CBC and GI consult called  f/u bloodwork

## 2017-05-23 NOTE — PROGRESS NOTE ADULT - SUBJECTIVE AND OBJECTIVE BOX
76y Female    Meds:    Allergies    No Known Allergies    Intolerances        VITALS:  Vital Signs Last 24 Hrs  T(C): 36.7, Max: 37.1 (05-22 @ 14:40)  T(F): 98, Max: 98.8 (05-22 @ 14:40)  HR: 75 (75 - 86)  BP: 138/63 (138/63 - 174/92)  BP(mean): --  RR: 16 (16 - 18)  SpO2: 95% (95% - 99%)    LABS/DIAGNOSTIC TESTS:                          10.1   10.6  )-----------( 337      ( 22 May 2017 12:41 )             29.8         05-22    140  |  104  |  10  ----------------------------<  196<H>  3.8   |  29  |  0.94    Ca    8.8      22 May 2017 12:41            CULTURES: .Urine Catheterized  05-18 @ 17:46   10,000 - 49,000 CFU/mL Enterococcus faecalis  --  Enterococcus faecalis      .Blood Blood  05-18 @ 10:37   No growth at 5 days.  --  --            RADIOLOGY:      ROS:  [  ] UNABLE TO ELICIT 76y Female who is doing well off antibiotics, she has no fevers or chills, no diarrhea ,her mental status is back to baseline and she has no other complaints.she is awaiting PT reeval to walk her.    Meds:    Allergies    No Known Allergies    Intolerances        VITALS:  Vital Signs Last 24 Hrs  T(C): 36.7, Max: 37.1 (05-22 @ 14:40)  T(F): 98, Max: 98.8 (05-22 @ 14:40)  HR: 75 (75 - 86)  BP: 138/63 (138/63 - 174/92)  BP(mean): --  RR: 16 (16 - 18)  SpO2: 95% (95% - 99%)    LABS/DIAGNOSTIC TESTS:                          10.1   10.6  )-----------( 337      ( 22 May 2017 12:41 )             29.8         05-22    140  |  104  |  10  ----------------------------<  196<H>  3.8   |  29  |  0.94    Ca    8.8      22 May 2017 12:41            CULTURES: .Urine Catheterized  05-18 @ 17:46   10,000 - 49,000 CFU/mL Enterococcus faecalis  --  Enterococcus faecalis      .Blood Blood  05-18 @ 10:37   No growth at 5 days.  --  --            RADIOLOGY:      ROS:  [  ] UNABLE TO ELICIT

## 2017-05-23 NOTE — PROGRESS NOTE ADULT - NSHPATTENDINGPLANDISCUSS_GEN_ALL_CORE
Primary team
icu team on rounds
Dr. Bowden, Patient, Son.
Patient, son.
icu resident

## 2017-05-23 NOTE — CONSULT NOTE ADULT - SUBJECTIVE AND OBJECTIVE BOX
75 y/o F  w/ PMH of CVA ( Right frontal MCA , s/p TPA in ), small SAH ( acute in Right frontal region, 2017) , presented with tongue heaviness, fatigue 5/10/17.     Patient was last admitted to [facility] in . with CVA , got TPA , admitted to ICU for monitoring, it was acute right MCA , later was found to have small right frontal SAH, also has left sided carotid artery stenosis with > 70 %.  Now s/p CEA.  Noted to be constipated now s/p enema noted scant amount of rectal bleed.  No prior episodes.  No prior colonoscopy.  Stable at present.  No melena or hematemesis.    Fhx: No family history of strokes, mother  at age of 73 due to heart attack (11 May 2017 02:56)      PAST MEDICAL & SURGICAL HISTORY:  Type 2 diabetes mellitus  Essential hypertension  DM (diabetes mellitus)  HTN (hypertension)  Fibromyalgia  History of appendectomy  No significant past surgical history      MEDICATIONS  (STANDING):  heparin  Injectable 5000Unit(s) SubCutaneous every 8 hours  aspirin  chewable 81milliGRAM(s) Oral daily  docusate sodium 100milliGRAM(s) Oral two times a day  insulin lispro (HumaLOG) corrective regimen sliding scale  SubCutaneous at bedtime  pantoprazole    Tablet 40milliGRAM(s) Oral before breakfast  tamsulosin 0.4milliGRAM(s) Oral at bedtime  insulin lispro (HumaLOG) corrective regimen sliding scale  SubCutaneous three times a day before meals  dextrose 5%. 1000milliLiter(s) IV Continuous <Continuous>  sodium chloride 0.9%. 1000milliLiter(s) IV Continuous <Continuous>  sodium chloride 0.9% Bolus 500milliLiter(s) IV Bolus once  nystatin Powder 1Application(s) Topical two times a day  losartan 25milliGRAM(s) Oral daily    MEDICATIONS  (PRN):  acetaminophen  Suppository 650milliGRAM(s) Rectal every 6 hours PRN For Temp greater than 38 C (100.4 F)  acetaminophen   Tablet. 650milliGRAM(s) Oral every 6 hours PRN Mild Pain (1 - 3)      Allergies    No Known Allergies    Intolerances        SOCIAL HISTORY:    FAMILY HISTORY:  Family history of acute myocardial infarction  Family history of cerebrovascular accident (CVA) (Sibling)      REVIEW OF SYSTEMS:    CONSTITUTIONAL: No weakness, fevers or chills  EYES/ENT: No visual changes;  No vertigo or throat pain   NECK: No pain or stiffness  RESPIRATORY: No cough, wheezing, hemoptysis; No shortness of breath  CARDIOVASCULAR: No chest pain or palpitations  GENITOURINARY: No dysuria, frequency or hematuria  NEUROLOGICAL: No numbness or weakness  SKIN: No itching, burning, rashes, or lesions   All other review of systems is negative unless indicated above.    Vital Signs Last 24 Hrs  T(C): 36.7, Max: 37.1 ( @ 14:40)  T(F): 98, Max: 98.8 ( @ 14:40)  HR: 75 (75 - 86)  BP: 138/63 (138/63 - 174/92)  BP(mean): --  RR: 16 (16 - 18)  SpO2: 95% (95% - 99%)    PHYSICAL EXAM:    Constitutional: NAD, well-developed  Respiratory: CTA and P  Cardiovascular: S1 and S2, RRR, no M  Gastrointestinal: BS+, soft, NT/ND, neg HSM,  Extremities: No peripheral edema, neg clubing, cyanosis  Rectal exam: empty rectal vault, adequate tone with no nodularity  Vascular: 2+ peripheral pulses  Neurological: A/O x 3,    LABS:  CBC Full  -  ( 22 May 2017 12:41 )  WBC Count : 10.6 K/uL  Hemoglobin : 10.1 g/dL  Hematocrit : 29.8 %  Platelet Count - Automated : 337 K/uL  Mean Cell Volume : 85.2 fl  Mean Cell Hemoglobin : 28.9 pg  Mean Cell Hemoglobin Concentration : 34.0 gm/dL  Auto Neutrophil # : x  Auto Lymphocyte # : x  Auto Monocyte # : x  Auto Eosinophil # : x  Auto Basophil # : x  Auto Neutrophil % : x  Auto Lymphocyte % : x  Auto Monocyte % : x  Auto Eosinophil % : x  Auto Basophil % : x        140  |  104  |  10  ----------------------------<  196<H>  3.8   |  29  |  0.94    Ca    8.8      22 May 2017 12:41              RADIOLOGY & ADDITIONAL STUDIES:

## 2017-05-23 NOTE — PROGRESS NOTE ADULT - SUBJECTIVE AND OBJECTIVE BOX
Patient is a 76y old  Female who presents with a chief complaint of Brought by son for sudden onset of tongue heaviness, slurred speech since 9:00 PM (12 May 2017 08:43)       INTERVAL HPI/OVERNIGHT EVENTS:    T(C): 36.7, Max: 37.1 (05-22 @ 14:40)  HR: 75 (75 - 86)  BP: 138/63 (138/63 - 174/92)  RR: 16 (16 - 18)  SpO2: 95% (95% - 99%)  Wt(kg): --    I&O's Summary    I & Os for current day (as of 23 May 2017 12:03)  =============================================  IN: 200 ml / OUT: 2900 ml / NET: -2700 ml      LABS:                        10.1   10.6  )-----------( 337      ( 22 May 2017 12:41 )             29.8     05-22    140  |  104  |  10  ----------------------------<  196<H>  3.8   |  29  |  0.94    Ca    8.8      22 May 2017 12:41        CAPILLARY BLOOD GLUCOSE  190 (23 May 2017 11:28)  158 (23 May 2017 07:41)  185 (22 May 2017 20:28)  193 (22 May 2017 16:02)        MEDICATIONS  (STANDING):  heparin  Injectable 5000Unit(s) SubCutaneous every 8 hours  aspirin  chewable 81milliGRAM(s) Oral daily  docusate sodium 100milliGRAM(s) Oral two times a day  insulin lispro (HumaLOG) corrective regimen sliding scale  SubCutaneous at bedtime  pantoprazole    Tablet 40milliGRAM(s) Oral before breakfast  tamsulosin 0.4milliGRAM(s) Oral at bedtime  insulin lispro (HumaLOG) corrective regimen sliding scale  SubCutaneous three times a day before meals  dextrose 5%. 1000milliLiter(s) IV Continuous <Continuous>  sodium chloride 0.9%. 1000milliLiter(s) IV Continuous <Continuous>  sodium chloride 0.9% Bolus 500milliLiter(s) IV Bolus once  nystatin Powder 1Application(s) Topical two times a day  losartan 25milliGRAM(s) Oral daily    MEDICATIONS  (PRN):  acetaminophen  Suppository 650milliGRAM(s) Rectal every 6 hours PRN For Temp greater than 38 C (100.4 F)  acetaminophen   Tablet. 650milliGRAM(s) Oral every 6 hours PRN Mild Pain (1 - 3)      REVIEW OF SYSTEMS:  CONSTITUTIONAL: No fever, weight loss, or fatigue  EYES: No eye pain, visual disturbances, or discharge  ENT:  No difficulty hearing. No vertigo. No sinus or throat pain  NECK: No pain or stiffness  RESPIRATORY: No cough, wheezing, chills or hemoptysis; No shortness of breath  CARDIOVASCULAR: No chest pain, palpitations, dizziness, or leg swelling  GASTROINTESTINAL: No abdominal or epigastric pain. No nausea, vomiting, or hematemesis; No diarrhea or constipation. No melena or hematochezia.  GENITOURINARY: No dysuria, frequency, or incontinence.  (+) blood spot on bed pad  NEUROLOGICAL: No headaches, memory loss, loss of strength, numbness, or tremors  SKIN: No itching, burning, rashes, or lesions   LYMPH NODES: No enlarged glands  ENDOCRINE: No heat or cold intolerance; No hair loss  MUSCULOSKELETAL: No joint pain or swelling; No muscle, back, or extremity pain  PSYCHIATRIC: No depression, anxiety, mood swings, or difficulty sleeping  HEME/LYMPH: Easy bruising, or bleeding gums  ALLERGY AND IMMUNOLOGIC: No hives or eczema    RADIOLOGY & ADDITIONAL TESTS:    Imaging Personally Reviewed:  [X ] YES  [ ] NO    Consultant(s) Notes Reviewed:  [X ] YES  [ ] NO    PHYSICAL EXAM:  GENERAL: NAD, well-groomed, well-developed  HEAD:  Atraumatic, Normocephalic  EYES: Conjunctiva and sclera clear  ENT: No tonsillar erythema, exudates, or enlargement; Moist mucous membranes  NECK: Supple, No JVD  NERVOUS SYSTEM:  Alert & Oriented X3  CHEST/LUNG: Clear to percussion bilaterally; No rales, rhonchi, wheezing, or rubs  HEART: Regular rate and rhythm; No murmurs, rubs, or gallops  ABDOMEN: Soft, Nontender, Nondistended; Bowel sounds present  EXTREMITIES:  + Peripheral Pulses, No clubbing, cyanosis, or edema  LYMPH: No lymphadenopathy noted  SKIN: Abdominal ecchymosis d/t lovenox    Care Collaborated Discussed with Consultants/Other Providers [X ] YES  [ ] NO

## 2017-05-23 NOTE — CONSULT NOTE ADULT - CONSULT REQUESTED DATE/TIME
11-May-2017 10:01
11-May-2017 17:00
15-May-2017 14:17
17-May-2017 14:20
18-May-2017
22-May-2017 18:52
23-May-2017 12:27
12-May-2017 17:30

## 2017-05-23 NOTE — PROGRESS NOTE ADULT - SUBJECTIVE AND OBJECTIVE BOX
Patient seen and examined.   Time of visit: 1450    MEDICATIONS  (STANDING):  heparin  Injectable 5000Unit(s) SubCutaneous every 8 hours  aspirin  chewable 81milliGRAM(s) Oral daily  docusate sodium 100milliGRAM(s) Oral two times a day  insulin lispro (HumaLOG) corrective regimen sliding scale  SubCutaneous at bedtime  pantoprazole    Tablet 40milliGRAM(s) Oral before breakfast  tamsulosin 0.4milliGRAM(s) Oral at bedtime  insulin lispro (HumaLOG) corrective regimen sliding scale  SubCutaneous three times a day before meals  dextrose 5%. 1000milliLiter(s) IV Continuous <Continuous>  sodium chloride 0.9%. 1000milliLiter(s) IV Continuous <Continuous>  sodium chloride 0.9% Bolus 500milliLiter(s) IV Bolus once  nystatin Powder 1Application(s) Topical two times a day  losartan 25milliGRAM(s) Oral daily      MEDICATIONS  (PRN):  acetaminophen  Suppository 650milliGRAM(s) Rectal every 6 hours PRN For Temp greater than 38 C (100.4 F)  acetaminophen   Tablet. 650milliGRAM(s) Oral every 6 hours PRN Mild Pain (1 - 3)   Medications up to date at time of exam.      PHYSICAL EXAMINATION:  Patient has no new complaints.  GENERAL: The patient is a well-developed, well-nourished, in no apparent distress.  + krueger draining    Vital Signs Last 24 Hrs  T(C): 36.6, Max: 36.9 (05-22 @ 20:28)  T(F): 97.8, Max: 98.5 (05-22 @ 20:28)  HR: 82 (75 - 86)  BP: 128/57 (127/52 - 152/79)  BP(mean): --  RR: 16 (16 - 16)  SpO2: 98% (95% - 99%)   (if applicable)      HEENT: Head is normocephalic and atraumatic.     NECK: Supple, no palpable adenopathy, + L steristrips    LUNGS: Clear to auscultation, no wheezing, rales, or rhonchi.    HEART: Regular rate and rhythm without murmur.    ABDOMEN: Soft, nontender, and nondistended.  No hepatosplenomegaly is noted.    EXTREMITIES: Without any cyanosis, clubbing, rash, lesions or edema.    NEUROLOGIC: Awake, alert.    SKIN: Warm, dry, good turgor.      LABS:                        11.2   9.9   )-----------( 366      ( 23 May 2017 12:33 )             32.5     05-22    140  |  104  |  10  ----------------------------<  196<H>  3.8   |  29  |  0.94    Ca    8.8      22 May 2017 12:41          MICROBIOLOGY: (if applicable)    RADIOLOGY & ADDITIONAL STUDIES:  EKG:   CXR:  ECHO:    IMPRESSION: 76y Female PAST MEDICAL & SURGICAL HISTORY:  Type 2 diabetes mellitus  Essential hypertension  DM (diabetes mellitus)  HTN (hypertension)  Fibromyalgia  History of appendectomy  No significant past surgical history       p/w     p/w TIA, s/p L CEA on 5/15/2017. Son is reporting patient is becoming more confused, improved today.    RECOMMENDATIONS:     - pt s/p metoprolol taper, started on losartan 25mg, BP controlled   - can discontinue statin for now, restart in 3 - 4 days, as per son, he wants a "statin holiday"   - son made aware that patient needs to take statin, agreeable to starting crestor after statin holiday.   - out patient f/u in my office.   - DVT and GI prophylaxis.

## 2017-05-23 NOTE — PROGRESS NOTE ADULT - ASSESSMENT
Episodes of delirium, ? polypharmacy vs hospital delirium  Since there are no new neurological findings, ok to defer MRI at this point.  Continue ASA  81 mg, risk factor control  Aggressive risk factor modification should be continued for secondary stroke prevention, consisting of antithrombotic therapy , intensive blood pressure control and lipid lowering therapy.I encouraged the patient to discuss these important issues with her primary care doctor.I have reviewed the goals of stroke risk factor modification. I counseled the patient on measures to reduce stroke risk, including the importance of medication compliance, risk factor control, exercise, healthy diet and avoidance of smoking. I reviewed stroke warning signs and symptoms and appropriate actions to take if such occur.

## 2017-05-23 NOTE — CONSULT NOTE ADULT - ASSESSMENT
s/p CEA  Rectal bleed s/p enema  -monitor hgb  -if no overt signs of active GI bleeding, can consider outpatient colonoscopy after vasc surgical clearance.  -call as needed

## 2017-05-23 NOTE — CONSULT NOTE ADULT - CONSULT REASON
TIA
Hx RCVA in jan 2017, found to have 70%LICA stenosis on CTA, was to f/u with Dr Brown as outpt but never did.
Post op Monitoring
Rectal bleed
TIA
Urinary Retention
fever
sob

## 2017-05-23 NOTE — PROGRESS NOTE ADULT - ASSESSMENT
s/p fevers  blood cults neg   urine cultures shows 10-49 k enterococcus  leukocytosis - mild  plan - off all abxs s/p fevers  blood cults neg   urine cultures shows 10-49 k enterococcus  leukocytosis - mild  plan - off all abxs  d/c planning.

## 2017-05-23 NOTE — PROGRESS NOTE ADULT - SUBJECTIVE AND OBJECTIVE BOX
CC:    HPI: HPI:  77 y/o F, home, walks with assistance, lives with son, AAOx3,  w/ PMH of CVA ( Right frontal MCA , s/p TPA in Jan2017), small SAH ( acute in Right frontal region, Jan 2017)  Fibromyalgia,  HTN ( Losartan HCTz 100/25),  DM2 ( Metformin 750mg BID, last HBA1c is 7.3 ),  s/p CEA for left Carotid stenosis    ROS:  Constitutional, Neurological, Psychiatric, Eyes, ENT, Cardiovascular, Respiratory, Gastrointestinal, Genitourinary, Musculoskeletal, Integumentary, Endocrine and Heme/Lymph are per chart.    Vital Signs Last 24 Hrs  T(C): 36.7, Max: 37.1 (05-22 @ 14:40)  T(F): 98, Max: 98.8 (05-22 @ 14:40)  HR: 75 (75 - 86)  BP: 138/63 (138/63 - 174/92)  BP(mean): --  RR: 16 (16 - 18)  SpO2: 95% (95% - 99%)    Physical Exam:  Constitutional: alert and in no acute distress.  Eyes: the sclera and conjunctiva were normal, pupils were equal in size, round, reactive to light, with normal accommodation and extraocular movements were intact.   Back: no costovertebral angle tenderness and no spinal tenderness.  Cardiovascular:    Neuro Exam:  Orientation: oriented to person, oriented to place and oriented to time.   Attention: normal concentrating ability and visual attention was not decreased.   Language: no difficulty naming common objects, no difficulty repeating a phrase, no difficulty writing a sentence, fluency intact, comprehension intact and reading intact.   Fund of knowledge: displays adequate knowledge of personal past history.   Cranial Nerves: visual acuity intact bilaterally, visual fields full to confrontation, pupils equal round and reactive to light, extraocular motion intact, facial sensation intact symmetrically, face symmetrical, hearing was intact bilaterally, tongue and palate midline, head turning and shoulder shrug symmetric and there was no tongue deviation with protrusion.   Motor: muscle tone was normal in all four extremities, muscle strength was normal in all four extremities and normal bulk in all four extremities. mild right hand decreased facilitation of of fine movements.   Sensory exam: light touch was intact.   Coordination:. normal gait. balance was intact. there was no past-pointing. no tremor present.   Deep tendon reflexes:   Biceps right 2+. Biceps left 2+.    Triceps right 2+. Triceps left 2+.  LOC  Brachioradialis right 2+. Brachioradialis left 2+.    Patella right 2+. Patella left 2+.    Ankle jerk right 2+. Ankle jerk left 2+.   Plantar responses normal on the right, normal on the left.        Allergies    No Known Allergies    Intolerances      MEDICATIONS  (STANDING):  heparin  Injectable 5000Unit(s) SubCutaneous every 8 hours  aspirin  chewable 81milliGRAM(s) Oral daily  docusate sodium 100milliGRAM(s) Oral two times a day  insulin lispro (HumaLOG) corrective regimen sliding scale  SubCutaneous at bedtime  pantoprazole    Tablet 40milliGRAM(s) Oral before breakfast  tamsulosin 0.4milliGRAM(s) Oral at bedtime  insulin lispro (HumaLOG) corrective regimen sliding scale  SubCutaneous three times a day before meals  dextrose 5%. 1000milliLiter(s) IV Continuous <Continuous>  sodium chloride 0.9%. 1000milliLiter(s) IV Continuous <Continuous>  sodium chloride 0.9% Bolus 500milliLiter(s) IV Bolus once  nystatin Powder 1Application(s) Topical two times a day  losartan 25milliGRAM(s) Oral daily    MEDICATIONS  (PRN):  acetaminophen  Suppository 650milliGRAM(s) Rectal every 6 hours PRN For Temp greater than 38 C (100.4 F)  acetaminophen   Tablet. 650milliGRAM(s) Oral every 6 hours PRN Mild Pain (1 - 3)      LABS:                        10.1   10.6  )-----------( 337      ( 22 May 2017 12:41 )             29.8     05-22    140  |  104  |  10  ----------------------------<  196<H>  3.8   |  29  |  0.94    Ca    8.8      22 May 2017 12:41        Neuro Imaging:  Head CT -05/19-  Acute/subacute infarcts along the right corona radiata white matter   again noted. Trace subarachnoid blood is similar along the high right   frontal sulci. CC: from primary team - memory loss    HPI: HPI:  75 y/o F, home, walks with assistance, lives with son, AAOx3,  w/ PMH of CVA ( Right frontal MCA , s/p TPA in Jan2017), small SAH ( acute in Right frontal region, Jan 2017)  Fibromyalgia,  HTN ( Losartan HCTz 100/25),  DM2 ( Metformin 750mg BID, last HBA1c is 7.3 ),  s/p CEA for left Carotid stenosis      ROS:  Constitutional, Neurological, Psychiatric, Eyes, ENT, Cardiovascular, Respiratory, Gastrointestinal, Genitourinary, Musculoskeletal, Integumentary, Endocrine and Heme/Lymph are per chart.    Vital Signs Last 24 Hrs  T(C): 36.7, Max: 37.1 (05-22 @ 14:40)  T(F): 98, Max: 98.8 (05-22 @ 14:40)  HR: 75 (75 - 86)  BP: 138/63 (138/63 - 174/92)  BP(mean): --  RR: 16 (16 - 18)  SpO2: 95% (95% - 99%)    Physical Exam:  Constitutional: alert and in no acute distress.  Eyes: the sclera and conjunctiva were normal, pupils were equal in size, round, reactive to light, with normal accommodation and extraocular movements were intact.   Back: no costovertebral angle tenderness and no spinal tenderness.  Cardiovascular:    Neuro Exam:  Orientation: oriented to person, oriented to place and oriented to time.   Attention: normal concentrating ability and visual attention was not decreased.   Language: no difficulty naming common objects, no difficulty repeating a phrase, no difficulty writing a sentence, fluency intact, comprehension intact and reading intact.   Fund of knowledge: displays adequate knowledge of personal past history.   Cranial Nerves: visual acuity intact bilaterally, visual fields full to confrontation, pupils equal round and reactive to light, extraocular motion intact, facial sensation intact symmetrically, face symmetrical, hearing was intact bilaterally, tongue and palate midline, head turning and shoulder shrug symmetric and there was no tongue deviation with protrusion.   Motor: muscle tone was normal in all four extremities, muscle strength was normal in all four extremities and normal bulk in all four extremities. mild right hand decreased facilitation of of fine movements.   Sensory exam: light touch was intact.   Coordination:. normal gait. balance was intact. there was no past-pointing. no tremor present.   Deep tendon reflexes:   Biceps right 2+. Biceps left 2+.    Triceps right 2+. Triceps left 2+.  LOC  Brachioradialis right 2+. Brachioradialis left 2+.    Patella right 2+. Patella left 2+.    Ankle jerk right 2+. Ankle jerk left 2+.   Plantar responses normal on the right, normal on the left.        Allergies    No Known Allergies    Intolerances      MEDICATIONS  (STANDING):  heparin  Injectable 5000Unit(s) SubCutaneous every 8 hours  aspirin  chewable 81milliGRAM(s) Oral daily  docusate sodium 100milliGRAM(s) Oral two times a day  insulin lispro (HumaLOG) corrective regimen sliding scale  SubCutaneous at bedtime  pantoprazole    Tablet 40milliGRAM(s) Oral before breakfast  tamsulosin 0.4milliGRAM(s) Oral at bedtime  insulin lispro (HumaLOG) corrective regimen sliding scale  SubCutaneous three times a day before meals  dextrose 5%. 1000milliLiter(s) IV Continuous <Continuous>  sodium chloride 0.9%. 1000milliLiter(s) IV Continuous <Continuous>  sodium chloride 0.9% Bolus 500milliLiter(s) IV Bolus once  nystatin Powder 1Application(s) Topical two times a day  losartan 25milliGRAM(s) Oral daily    MEDICATIONS  (PRN):  acetaminophen  Suppository 650milliGRAM(s) Rectal every 6 hours PRN For Temp greater than 38 C (100.4 F)  acetaminophen   Tablet. 650milliGRAM(s) Oral every 6 hours PRN Mild Pain (1 - 3)      LABS:                        10.1   10.6  )-----------( 337      ( 22 May 2017 12:41 )             29.8     05-22    140  |  104  |  10  ----------------------------<  196<H>  3.8   |  29  |  0.94    Ca    8.8      22 May 2017 12:41        Neuro Imaging:  Head CT -05/19-  Acute/subacute infarcts along the right corona radiata white matter   again noted. Trace subarachnoid blood is similar along the high right   frontal sulci.    MRI - 05/12 -   No acute infarct, chronic lacunar infarctions, left cerebral cavernoma CC: from primary team - memory loss    HPI: HPI:  77 y/o F, home, walks with assistance, lives with son, AAOx3,  w/ PMH of CVA ( Right frontal MCA , s/p TPA in Jan2017), small SAH ( acute in Right frontal region, Jan 2017)  Fibromyalgia,  HTN ( Losartan HCTz 100/25),  DM2 ( Metformin 750mg BID, last HBA1c is 7.3 ),  s/p CEA for left Carotid stenosis  Per son patient had episodes suggestive of hospital delirium, it appears patient is doing better overall       ROS:  Constitutional, Neurological, Psychiatric, Eyes, ENT, Cardiovascular, Respiratory, Gastrointestinal, Genitourinary, Musculoskeletal, Integumentary, Endocrine and Heme/Lymph are per chart.    Vital Signs Last 24 Hrs  T(C): 36.7, Max: 37.1 (05-22 @ 14:40)  T(F): 98, Max: 98.8 (05-22 @ 14:40)  HR: 75 (75 - 86)  BP: 138/63 (138/63 - 174/92)  BP(mean): --  RR: 16 (16 - 18)  SpO2: 95% (95% - 99%)    Physical Exam:  Constitutional: alert and in no acute distress.  Eyes: the sclera and conjunctiva were normal, pupils were equal in size, round, reactive to light, with normal accommodation and extraocular movements were intact.   Back: no costovertebral angle tenderness and no spinal tenderness.      Neuro Exam:  Orientation: oriented to person, oriented to place and oriented to month.   Attention: normal concentrating ability and visual attention was not decreased.   Language: no aphasia .   Fund of knowledge: displays adequate knowledge of personal past history.   Cranial Nerves: visual acuity intact bilaterally, visual fields full to confrontation, pupils equal round and reactive to light, extraocular motion intact, facial sensation intact symmetrically, face symmetrical, hearing was intact bilaterally, tongue and palate midline, head turning and shoulder shrug symmetric and there was no tongue deviation with protrusion.   Motor: muscle tone was normal in all four extremities, muscle strength was normal in all four extremities and normal bulk in all four extremities. mild right hand decreased facilitation of of fine movements.   Sensory exam: light touch was intact.   Coordination:.gait . balance was intact. there was no past-pointing. no tremor present.   Deep tendon reflexes:   Biceps right 2+. Biceps left 2+.    Triceps right 2+. Triceps left 2+.  LOC  Brachioradialis right 2+. Brachioradialis left 2+.    Patella right 2+. Patella left 2+.    Ankle jerk right 2+. Ankle jerk left 2+.   Plantar responses normal on the right, normal on the left.        Allergies    No Known Allergies    Intolerances      MEDICATIONS  (STANDING):  heparin  Injectable 5000Unit(s) SubCutaneous every 8 hours  aspirin  chewable 81milliGRAM(s) Oral daily  docusate sodium 100milliGRAM(s) Oral two times a day  insulin lispro (HumaLOG) corrective regimen sliding scale  SubCutaneous at bedtime  pantoprazole    Tablet 40milliGRAM(s) Oral before breakfast  tamsulosin 0.4milliGRAM(s) Oral at bedtime  insulin lispro (HumaLOG) corrective regimen sliding scale  SubCutaneous three times a day before meals  dextrose 5%. 1000milliLiter(s) IV Continuous <Continuous>  sodium chloride 0.9%. 1000milliLiter(s) IV Continuous <Continuous>  sodium chloride 0.9% Bolus 500milliLiter(s) IV Bolus once  nystatin Powder 1Application(s) Topical two times a day  losartan 25milliGRAM(s) Oral daily    MEDICATIONS  (PRN):  acetaminophen  Suppository 650milliGRAM(s) Rectal every 6 hours PRN For Temp greater than 38 C (100.4 F)  acetaminophen   Tablet. 650milliGRAM(s) Oral every 6 hours PRN Mild Pain (1 - 3)      LABS:                        10.1   10.6  )-----------( 337      ( 22 May 2017 12:41 )             29.8     05-22    140  |  104  |  10  ----------------------------<  196<H>  3.8   |  29  |  0.94    Ca    8.8      22 May 2017 12:41        Neuro Imaging:  Head CT -05/19-  Acute/subacute infarcts along the right corona radiata white matter   again noted. Trace subarachnoid blood is similar along the high right   frontal sulci.    MRI - 05/12 -   No acute infarct, chronic lacunar infarctions, left cerebral cavernoma

## 2017-05-24 VITALS
RESPIRATION RATE: 18 BRPM | HEART RATE: 86 BPM | TEMPERATURE: 98 F | SYSTOLIC BLOOD PRESSURE: 134 MMHG | DIASTOLIC BLOOD PRESSURE: 62 MMHG | OXYGEN SATURATION: 99 %

## 2017-05-24 PROCEDURE — C1889: CPT

## 2017-05-24 PROCEDURE — 85027 COMPLETE CBC AUTOMATED: CPT

## 2017-05-24 PROCEDURE — 87040 BLOOD CULTURE FOR BACTERIA: CPT

## 2017-05-24 PROCEDURE — 82607 VITAMIN B-12: CPT

## 2017-05-24 PROCEDURE — 76770 US EXAM ABDO BACK WALL COMP: CPT

## 2017-05-24 PROCEDURE — 86900 BLOOD TYPING SEROLOGIC ABO: CPT

## 2017-05-24 PROCEDURE — 70551 MRI BRAIN STEM W/O DYE: CPT

## 2017-05-24 PROCEDURE — 82550 ASSAY OF CK (CPK): CPT

## 2017-05-24 PROCEDURE — 88304 TISSUE EXAM BY PATHOLOGIST: CPT

## 2017-05-24 PROCEDURE — 74018 RADEX ABDOMEN 1 VIEW: CPT

## 2017-05-24 PROCEDURE — 88311 DECALCIFY TISSUE: CPT

## 2017-05-24 PROCEDURE — 85610 PROTHROMBIN TIME: CPT

## 2017-05-24 PROCEDURE — 97161 PT EVAL LOW COMPLEX 20 MIN: CPT

## 2017-05-24 PROCEDURE — 80053 COMPREHEN METABOLIC PANEL: CPT

## 2017-05-24 PROCEDURE — 93005 ELECTROCARDIOGRAM TRACING: CPT

## 2017-05-24 PROCEDURE — 87086 URINE CULTURE/COLONY COUNT: CPT

## 2017-05-24 PROCEDURE — 82306 VITAMIN D 25 HYDROXY: CPT

## 2017-05-24 PROCEDURE — 84300 ASSAY OF URINE SODIUM: CPT

## 2017-05-24 PROCEDURE — 93880 EXTRACRANIAL BILAT STUDY: CPT

## 2017-05-24 PROCEDURE — 83735 ASSAY OF MAGNESIUM: CPT

## 2017-05-24 PROCEDURE — 82272 OCCULT BLD FECES 1-3 TESTS: CPT

## 2017-05-24 PROCEDURE — 83605 ASSAY OF LACTIC ACID: CPT

## 2017-05-24 PROCEDURE — 80061 LIPID PANEL: CPT

## 2017-05-24 PROCEDURE — 97162 PT EVAL MOD COMPLEX 30 MIN: CPT

## 2017-05-24 PROCEDURE — 83930 ASSAY OF BLOOD OSMOLALITY: CPT

## 2017-05-24 PROCEDURE — 71275 CT ANGIOGRAPHY CHEST: CPT

## 2017-05-24 PROCEDURE — 76775 US EXAM ABDO BACK WALL LIM: CPT

## 2017-05-24 PROCEDURE — 84484 ASSAY OF TROPONIN QUANT: CPT

## 2017-05-24 PROCEDURE — 87186 SC STD MICRODIL/AGAR DIL: CPT

## 2017-05-24 PROCEDURE — 71045 X-RAY EXAM CHEST 1 VIEW: CPT

## 2017-05-24 PROCEDURE — 83935 ASSAY OF URINE OSMOLALITY: CPT

## 2017-05-24 PROCEDURE — P9040: CPT

## 2017-05-24 PROCEDURE — 97116 GAIT TRAINING THERAPY: CPT

## 2017-05-24 PROCEDURE — 82746 ASSAY OF FOLIC ACID SERUM: CPT

## 2017-05-24 PROCEDURE — 80048 BASIC METABOLIC PNL TOTAL CA: CPT

## 2017-05-24 PROCEDURE — 82553 CREATINE MB FRACTION: CPT

## 2017-05-24 PROCEDURE — 83036 HEMOGLOBIN GLYCOSYLATED A1C: CPT

## 2017-05-24 PROCEDURE — 70450 CT HEAD/BRAIN W/O DYE: CPT

## 2017-05-24 PROCEDURE — 93925 LOWER EXTREMITY STUDY: CPT

## 2017-05-24 PROCEDURE — 85730 THROMBOPLASTIN TIME PARTIAL: CPT

## 2017-05-24 PROCEDURE — 99285 EMERGENCY DEPT VISIT HI MDM: CPT | Mod: 25

## 2017-05-24 PROCEDURE — 84443 ASSAY THYROID STIM HORMONE: CPT

## 2017-05-24 PROCEDURE — 84100 ASSAY OF PHOSPHORUS: CPT

## 2017-05-24 PROCEDURE — 99233 SBSQ HOSP IP/OBS HIGH 50: CPT

## 2017-05-24 PROCEDURE — 94640 AIRWAY INHALATION TREATMENT: CPT

## 2017-05-24 PROCEDURE — 82803 BLOOD GASES ANY COMBINATION: CPT

## 2017-05-24 PROCEDURE — 86901 BLOOD TYPING SEROLOGIC RH(D): CPT

## 2017-05-24 PROCEDURE — C1768: CPT

## 2017-05-24 PROCEDURE — 86920 COMPATIBILITY TEST SPIN: CPT

## 2017-05-24 PROCEDURE — 81001 URINALYSIS AUTO W/SCOPE: CPT

## 2017-05-24 PROCEDURE — 86850 RBC ANTIBODY SCREEN: CPT

## 2017-05-24 RX ORDER — ROSUVASTATIN CALCIUM 5 MG/1
1 TABLET ORAL
Qty: 30 | Refills: 0 | OUTPATIENT
Start: 2017-05-24 | End: 2017-06-23

## 2017-05-24 RX ORDER — PANTOPRAZOLE SODIUM 20 MG/1
1 TABLET, DELAYED RELEASE ORAL
Qty: 30 | Refills: 0 | OUTPATIENT
Start: 2017-05-24 | End: 2017-06-23

## 2017-05-24 RX ORDER — TAMSULOSIN HYDROCHLORIDE 0.4 MG/1
1 CAPSULE ORAL
Qty: 30 | Refills: 0 | OUTPATIENT
Start: 2017-05-24 | End: 2017-06-23

## 2017-05-24 RX ORDER — METHENAMINE MANDELATE 1 G
1 TABLET ORAL
Qty: 60 | Refills: 0 | OUTPATIENT
Start: 2017-05-24 | End: 2017-06-23

## 2017-05-24 RX ORDER — LOSARTAN POTASSIUM 100 MG/1
1 TABLET, FILM COATED ORAL
Qty: 30 | Refills: 0 | OUTPATIENT
Start: 2017-05-24 | End: 2017-06-23

## 2017-05-24 RX ADMIN — Medication 4: at 11:58

## 2017-05-24 RX ADMIN — HEPARIN SODIUM 5000 UNIT(S): 5000 INJECTION INTRAVENOUS; SUBCUTANEOUS at 05:18

## 2017-05-24 RX ADMIN — Medication 100 MILLIGRAM(S): at 05:19

## 2017-05-24 RX ADMIN — NYSTATIN CREAM 1 APPLICATION(S): 100000 CREAM TOPICAL at 05:19

## 2017-05-24 RX ADMIN — LOSARTAN POTASSIUM 25 MILLIGRAM(S): 100 TABLET, FILM COATED ORAL at 05:19

## 2017-05-24 RX ADMIN — Medication 81 MILLIGRAM(S): at 11:57

## 2017-05-24 RX ADMIN — PANTOPRAZOLE SODIUM 40 MILLIGRAM(S): 20 TABLET, DELAYED RELEASE ORAL at 06:58

## 2017-05-24 NOTE — PROGRESS NOTE ADULT - SUBJECTIVE AND OBJECTIVE BOX
I reviewed the MRI results and spoke with the primary team, small right hemispheric acute infarct, etiology embolic versus large artery atherosclerosis. I recommend she follow for the cardiologist and have loop recorder placed to look for paroxysmal atrial fibrillation in addition to large artery disease/atherosclerosis as a possible etiology of infarct.  F/u with Neurology and cardiology for carotid artery disease. I reviewed the MRI results and spoke with the primary team, small right hemispheric acute infarct, etiology embolic versus large artery atherosclerosis. I recommend she follow for the cardiologist and have loop recorder placed to look for paroxysmal atrial fibrillation in addition to large artery disease/atherosclerosis as a possible etiology of infarct.  F/u with Neurology and cardiology for carotid artery disease.  No change in Antithrombotic therapy; continue ASA 81 mg.  Aggressive risk factor modification should be continued for secondary stroke prevention, consisting of antithrombotic therapy , intensive blood pressure control and lipid lowering therapy.I encouraged the patient to discuss these important issues with her primary care doctor.I have reviewed the goals of stroke risk factor modification.

## 2017-05-24 NOTE — PROGRESS NOTE ADULT - ATTENDING COMMENTS
Agree with above assessment and plan as transcribed above.
Agree with above assessment and plan as transcribed above
Assessment and Plan:   · Assessment	  Patient is a 75 y/o F, home, walks with assistance, lives with son, AAOx3,  w/ PMH of CVA (Right frontal MCA , s/p TPA in Jan2017), small SAH (acute in Right frontal region, Jan 2017)  Fibromyalgia,  HTN (Losartan HCTz 100/25),  DM2 ( Metformin 750mg BID, last HBA1c is 7.3 ), presented with TIA and found to have 70% stenosis of left carotid artery requiring carotid artery endarterectomy today. Admit to ICU for post op monitoring. POD 1     1. CNS: alert oriented.   - POD 1 for CEA. Will continue pain management   - Has h/o CVA; will continue aspirin and statin as per guidelines     2. CVS: BP controlled     3. Resp: saturating well on room air   - aspiration precautions     4. Endo   - A1c is 7.3  - continue HSS and hold metformin     5. Prophylaxis   - sc heparin for DVT ppx to be started in the evening.  - po protonix for GI ppx while in ICU     6. Renal   - Jj's catheter for I/O     7. ID issues  - none    8. Skin   - surgical site clean
I agree with above
Patient's son c/o that pregabalgin has made her drowsy and weaker than she was.   She and he both complain that the high dose of atorvostatin has caused pain in her knees.  He also is concerned that she has a decrease in short-term memory.     I have examined her, and asked to to remember a simple object for a period of five minutes.  She has no difficulty remembering that I showed her a group of keys.   Lungs are clear, Cor, III/IV murmur at the right sternal border, without radiation.   She has no focal neurologic deficit.    Carotid studies:     Carotid duplex Doppler ultrasound is performed. Grayscale ultrasound  demonstrates atherosclerotic plaques in the bilateral carotid bulbs and   proximal internal carotid arteries. The flow velocity measurements during   peak systolic phase in centimeters per second are as the following:    Right CCA 46, ICA 55, ECA 61.  Left CCA 46, ICA 70, .    The end diastolic velocity measurement for the right ICA is 18, and  for   the left ICA is 22.    The peak systolic ICA/CCA velocity ratio on the right is 1.2, and on the   left is 1.5.    Both vertebral arteries maintain normal antegrade flow direction.    Impression: No hemodynamically significant internal carotid artery   stenosis by velocity criteria.  Carotid duplex Doppler ultrasound is performed. Grayscale ultrasound  demonstrates atherosclerotic plaques in the bilateral carotid bulbs and   proximal internal carotid arteries. The flow velocity measurements during   peak systolic phase in centimeters per second are as the following:    Right CCA 46, ICA 55, ECA 61.  Left CCA 46, ICA 70, .    The end diastolic velocity measurement for the right ICA is 18, and  for   the left ICA is 22.    The peak systolic ICA/CCA velocity ratio on the right is 1.2, and on the   left is 1.5.    Both vertebral arteries maintain normal antegrade flow direction.    Impression: No hemodynamically significant internal carotid artery   stenosis by velocity criteria.    Ultrasound LE:    IMPRESSION:     BILATERAL LOWER EXTREMITY ARTERIAL DISEASE, WORSE ON THE LEFT SIDE,   LIKELY BELOW THE KNEE AND INTRINSIC TO THE FEET.    IMP:  s/p TIA.  Previous obstruction of the left carotid artery appears to have improved since the January study.            LLE arterial stenosis is found.   Plan:  Cardiology, Vascular Surgery, and Neurology evaluation.            Will decrease Pregabalin to 50 mg hs, only.            Decrease atorvastatin to 40 mg daily because of symptoms.
Patient seen/evaluated at bedside on 5/14/2017. I agree with the resident progress note/outlined plan of care. My independent findings and conclusions are documented.    Patient denies blurry vision, focal weakness, fevers/chills/nausea/vomiting/ abdominal pain/ dysuria/cough/sputum production. Notably with blood sugars ranging 171 to 367 today. She has not yet been started on the dextrose based IVF ordered for the evening. Per patient, fingersticks at home typically range in the mid 100s range.     She is planned for Left CEA tomorrow    1. TIA  2. Left Carotid artery stenosis planned for Left CEA   3. DM T II with hyperglycemia  4. hyponatremia (resolved)    may benefit from low dose lantus while NPO as FS>200 suboptimal in perioperative setting: give 4 units this evening; continue with sliding scale  -IVF hydration with Normal saline to prevent recurrent hyperglycemia   -statin  -for CEA tomorrow AM; NPO p MN  -ASA/heparin held for surgical intervention
· Assessment	  76 year old lady with s/p left carotid endarterectomy POD 4, now having sepsis.     Problem/Plan - 1:  ·  Problem: Sepsis, due to unspecified organism.  Plan: WBC is trending down. vital is stable.   - follow up with blood culture.   - give 1 L of bolus and continue IVF.   - follow up with ID specialist.  - discussed with surgical PATing for the recommendation..     Problem/Plan - 2:  ·  Problem: Abdominal distension.  Plan: most likely secondary to constipation.   - give enough stool softener..     Problem/Plan - 3:  ·  Problem: Carotid artery stenosis, unilateral, left.  Plan: - follow up with surgery.     Problem/Plan - 4:  ·  Problem: Fibromyalgia.  Plan: complained of whole body pain.   - give enough pain management..     Problem/Plan - 5:  ·  Problem: Prophylactic measure.  Plan: DVT and GI ppx..  pat mental status encephalopathy) improved and blood pressure improved with 500 ml ivf bolus. advise to give another 1000 ml ivf bolus and f/u sepesis work up

## 2017-05-24 NOTE — PROGRESS NOTE ADULT - SUBJECTIVE AND OBJECTIVE BOX
stable, no further episodes of rectal bleed.  Stable hgb                MEDICATIONS  (STANDING):  heparin  Injectable 5000Unit(s) SubCutaneous every 8 hours  aspirin  chewable 81milliGRAM(s) Oral daily  docusate sodium 100milliGRAM(s) Oral two times a day  insulin lispro (HumaLOG) corrective regimen sliding scale  SubCutaneous at bedtime  pantoprazole    Tablet 40milliGRAM(s) Oral before breakfast  tamsulosin 0.4milliGRAM(s) Oral at bedtime  insulin lispro (HumaLOG) corrective regimen sliding scale  SubCutaneous three times a day before meals  dextrose 5%. 1000milliLiter(s) IV Continuous <Continuous>  sodium chloride 0.9%. 1000milliLiter(s) IV Continuous <Continuous>  sodium chloride 0.9% Bolus 500milliLiter(s) IV Bolus once  nystatin Powder 1Application(s) Topical two times a day  losartan 25milliGRAM(s) Oral daily    MEDICATIONS  (PRN):  acetaminophen  Suppository 650milliGRAM(s) Rectal every 6 hours PRN For Temp greater than 38 C (100.4 F)  acetaminophen   Tablet. 650milliGRAM(s) Oral every 6 hours PRN Mild Pain (1 - 3)      Vital Signs Last 24 Hrs  T(C): 36.9, Max: 36.9 (05-23 @ 20:05)  T(F): 98.5, Max: 98.5 (05-23 @ 20:05)  HR: 86 (74 - 89)  BP: 134/62 (127/52 - 154/73)  BP(mean): --  RR: 18 (16 - 18)  SpO2: 99% (95% - 99%)    PHYSICAL EXAM:    Constitutional: NAD,  Neck: No LAD, supple  Respiratory: CTA and P  Cardiovascular: S1 and S2, RRR, no M  Gastrointestinal: BS+, soft, NT/ND  Vascular: 2+ peripheral pulses    LABS:  CBC Full  -  ( 23 May 2017 12:33 )  WBC Count : 9.9 K/uL  Hemoglobin : 11.2 g/dL  Hematocrit : 32.5 %  Platelet Count - Automated : 366 K/uL  Mean Cell Volume : 84.1 fl  Mean Cell Hemoglobin : 29.1 pg  Mean Cell Hemoglobin Concentration : 34.6 gm/dL  Auto Neutrophil # : x  Auto Lymphocyte # : x  Auto Monocyte # : x  Auto Eosinophil # : x  Auto Basophil # : x  Auto Neutrophil % : x  Auto Lymphocyte % : x  Auto Monocyte % : x  Auto Eosinophil % : x  Auto Basophil % : x                  RADIOLOGY & ADDITIONAL STUDIES:

## 2017-05-24 NOTE — PROGRESS NOTE ADULT - EXTREMITIES
No cyanosis, clubbing or edema
detailed exam
No cyanosis, clubbing or edema
detailed exam
No cyanosis, clubbing or edema

## 2017-05-24 NOTE — PROGRESS NOTE ADULT - SUBJECTIVE AND OBJECTIVE BOX
76y Female continues to do well off antibiotics.  Has not had any fevers and wbc count is normal. Due for DC home today on no antibiotics.    Meds: No Known Allergies    VITALS:  Vital Signs Last 24 Hrs  T(C): 36.8, Max: 36.9 (05-23 @ 20:05)  T(F): 98.3, Max: 98.5 (05-23 @ 20:05)  HR: 74 (74 - 89)  BP: 154/73 (127/52 - 154/73)  BP(mean): --  RR: 16 (16 - 16)  SpO2: 95% (95% - 99%)    LABS/DIAGNOSTIC TESTS:  no new labs      CULTURES: .Urine Catheterized  05-18 @ 17:46   10,000 - 49,000 CFU/mL Enterococcus faecalis  --  Enterococcus faecalis      .Blood Blood  05-18 @ 10:37   No growth at 5 days.  --  --

## 2017-05-24 NOTE — PROGRESS NOTE ADULT - CONSTITUTIONAL
detailed exam
Well-developed, well nourished
detailed exam
detailed exam
Well-developed, well nourished

## 2017-05-24 NOTE — PROGRESS NOTE ADULT - PROVIDER SPECIALTY LIST ADULT
Cardiology
Critical Care
Gastroenterology
Hospitalist
Infectious Disease
Internal Medicine
Neurology
Pulmonology
Surgery
Surgery
Vascular Surgery
Cardiology
Hospitalist

## 2017-05-24 NOTE — PROGRESS NOTE ADULT - RESPIRATORY
detailed exam
Breath Sounds equal & clear to percussion & auscultation, no accessory muscle use
detailed exam

## 2017-05-24 NOTE — PROGRESS NOTE ADULT - SUBJECTIVE AND OBJECTIVE BOX
Patient seen and examined.   Time of visit:1330    MEDICATIONS  (STANDING):  heparin  Injectable 5000Unit(s) SubCutaneous every 8 hours  aspirin  chewable 81milliGRAM(s) Oral daily  docusate sodium 100milliGRAM(s) Oral two times a day  insulin lispro (HumaLOG) corrective regimen sliding scale  SubCutaneous at bedtime  pantoprazole    Tablet 40milliGRAM(s) Oral before breakfast  tamsulosin 0.4milliGRAM(s) Oral at bedtime  insulin lispro (HumaLOG) corrective regimen sliding scale  SubCutaneous three times a day before meals  dextrose 5%. 1000milliLiter(s) IV Continuous <Continuous>  sodium chloride 0.9%. 1000milliLiter(s) IV Continuous <Continuous>  sodium chloride 0.9% Bolus 500milliLiter(s) IV Bolus once  nystatin Powder 1Application(s) Topical two times a day  losartan 25milliGRAM(s) Oral daily      MEDICATIONS  (PRN):  acetaminophen  Suppository 650milliGRAM(s) Rectal every 6 hours PRN For Temp greater than 38 C (100.4 F)  acetaminophen   Tablet. 650milliGRAM(s) Oral every 6 hours PRN Mild Pain (1 - 3)   Medications up to date at time of exam.      PHYSICAL EXAMINATION:  Patient has no new complaints.  GENERAL: The patient is a well-developed, well-nourished, in no apparent distress.     Vital Signs Last 24 Hrs  T(C): 36.9, Max: 36.9 (05-23 @ 20:05)  T(F): 98.5, Max: 98.5 (05-23 @ 20:05)  HR: 86 (74 - 89)  BP: 134/62 (127/52 - 154/73)  BP(mean): --  RR: 18 (16 - 18)  SpO2: 99% (95% - 99%)   (if applicable)      HEENT: Head is normocephalic and atraumatic.     NECK: Supple, no palpable adenopathy, + L steristrips    LUNGS: Clear to auscultation, no wheezing, rales, or rhonchi.    HEART: Regular rate and rhythm without murmur.    ABDOMEN: Soft, nontender, and nondistended.  No hepatosplenomegaly is noted.    EXTREMITIES: Without any cyanosis, clubbing, rash, lesions or edema.    NEUROLOGIC: Awake, alert.    SKIN: Warm, dry, good turgor.    + krueger draining    LABS:                        11.2   9.9   )-----------( 366      ( 23 May 2017 12:33 )             32.5           MICROBIOLOGY: (if applicable)    RADIOLOGY & ADDITIONAL STUDIES:133  EKG:   CXR:  ECHO:    IMPRESSION: 76y Female PAST MEDICAL & SURGICAL HISTORY:  Type 2 diabetes mellitus  Essential hypertension  DM (diabetes mellitus)  HTN (hypertension)  Fibromyalgia  History of appendectomy  No significant past surgical history       p/w TIA, s/p L CEA on 5/15/2017.     RECOMMENDATIONS:     - pt s/p metoprolol taper, started on losartan 25mg, BP controlled   - can discontinue statin for now, restart in 3 - 4 days, as per son, he wants a "statin holiday"   - son made aware that patient needs to take statin, agreeable to starting crestor after statin holiday.   - out patient f/u in my office.   - DVT and GI prophylaxis.

## 2017-05-26 DIAGNOSIS — E87.1 HYPO-OSMOLALITY AND HYPONATREMIA: ICD-10-CM

## 2017-05-26 DIAGNOSIS — N39.0 URINARY TRACT INFECTION, SITE NOT SPECIFIED: ICD-10-CM

## 2017-05-26 DIAGNOSIS — Z79.82 LONG TERM (CURRENT) USE OF ASPIRIN: ICD-10-CM

## 2017-05-26 DIAGNOSIS — I10 ESSENTIAL (PRIMARY) HYPERTENSION: ICD-10-CM

## 2017-05-26 DIAGNOSIS — Z86.73 PERSONAL HISTORY OF TRANSIENT ISCHEMIC ATTACK (TIA), AND CEREBRAL INFARCTION WITHOUT RESIDUAL DEFICITS: ICD-10-CM

## 2017-05-26 DIAGNOSIS — R33.9 RETENTION OF URINE, UNSPECIFIED: ICD-10-CM

## 2017-05-26 DIAGNOSIS — E11.9 TYPE 2 DIABETES MELLITUS WITHOUT COMPLICATIONS: ICD-10-CM

## 2017-05-26 DIAGNOSIS — K62.5 HEMORRHAGE OF ANUS AND RECTUM: ICD-10-CM

## 2017-05-26 DIAGNOSIS — M79.7 FIBROMYALGIA: ICD-10-CM

## 2017-05-26 DIAGNOSIS — I65.22 OCCLUSION AND STENOSIS OF LEFT CAROTID ARTERY: ICD-10-CM

## 2017-05-26 DIAGNOSIS — I73.9 PERIPHERAL VASCULAR DISEASE, UNSPECIFIED: ICD-10-CM

## 2017-05-30 DIAGNOSIS — A41.9 SEPSIS, UNSPECIFIED ORGANISM: ICD-10-CM

## 2017-06-02 ENCOUNTER — APPOINTMENT (OUTPATIENT)
Dept: UROLOGY | Facility: CLINIC | Age: 77
End: 2017-06-02

## 2017-06-02 VITALS
TEMPERATURE: 98.4 F | HEIGHT: 58 IN | DIASTOLIC BLOOD PRESSURE: 76 MMHG | SYSTOLIC BLOOD PRESSURE: 108 MMHG | HEART RATE: 80 BPM

## 2017-06-02 DIAGNOSIS — F15.90 OTHER STIMULANT USE, UNSPECIFIED, UNCOMPLICATED: ICD-10-CM

## 2017-06-02 DIAGNOSIS — Z82.49 FAMILY HISTORY OF ISCHEMIC HEART DISEASE AND OTHER DISEASES OF THE CIRCULATORY SYSTEM: ICD-10-CM

## 2017-06-02 DIAGNOSIS — Z78.9 OTHER SPECIFIED HEALTH STATUS: ICD-10-CM

## 2017-06-08 RX ORDER — METFORMIN HYDROCHLORIDE 850 MG/1
1 TABLET ORAL
Qty: 0 | Refills: 0 | COMMUNITY

## 2017-06-08 RX ORDER — ASPIRIN/CALCIUM CARB/MAGNESIUM 324 MG
1 TABLET ORAL
Qty: 0 | Refills: 0 | COMMUNITY

## 2017-06-08 RX ORDER — METFORMIN HYDROCHLORIDE 850 MG/1
1 TABLET ORAL
Qty: 60 | Refills: 0 | COMMUNITY

## 2017-06-14 ENCOUNTER — APPOINTMENT (OUTPATIENT)
Dept: INTERNAL MEDICINE | Facility: CLINIC | Age: 77
End: 2017-06-14

## 2017-06-14 VITALS
SYSTOLIC BLOOD PRESSURE: 120 MMHG | HEIGHT: 58 IN | WEIGHT: 140 LBS | OXYGEN SATURATION: 98 % | HEART RATE: 75 BPM | BODY MASS INDEX: 29.39 KG/M2 | DIASTOLIC BLOOD PRESSURE: 80 MMHG | TEMPERATURE: 97.7 F | RESPIRATION RATE: 16 BRPM

## 2017-06-14 DIAGNOSIS — Z82.49 FAMILY HISTORY OF ISCHEMIC HEART DISEASE AND OTHER DISEASES OF THE CIRCULATORY SYSTEM: ICD-10-CM

## 2017-06-14 RX ORDER — LOSARTAN POTASSIUM 25 MG/1
25 TABLET, FILM COATED ORAL DAILY
Refills: 0 | Status: COMPLETED | COMMUNITY
End: 2017-06-14

## 2017-06-14 RX ORDER — ASPIRIN 81 MG
81 TABLET, DELAYED RELEASE (ENTERIC COATED) ORAL DAILY
Refills: 0 | Status: COMPLETED | COMMUNITY
End: 2017-06-14

## 2017-06-14 RX ORDER — METHENAMINE HIPPURATE 1 G/1
1 TABLET ORAL TWICE DAILY
Refills: 0 | Status: COMPLETED | COMMUNITY
End: 2017-06-14

## 2017-06-14 RX ORDER — TAMSULOSIN HYDROCHLORIDE 0.4 MG/1
0.4 CAPSULE ORAL AT BEDTIME
Refills: 0 | Status: COMPLETED | COMMUNITY
End: 2017-06-14

## 2017-06-14 RX ORDER — ASPIRIN 81 MG/1
81 TABLET, CHEWABLE ORAL
Refills: 0 | Status: ACTIVE | COMMUNITY
Start: 2017-06-14

## 2017-06-15 PROBLEM — Z82.49 FAMILY HISTORY OF MYOCARDIAL INFARCTION: Status: ACTIVE | Noted: 2017-06-14

## 2017-06-20 ENCOUNTER — APPOINTMENT (OUTPATIENT)
Dept: UROLOGY | Facility: CLINIC | Age: 77
End: 2017-06-20

## 2017-06-20 VITALS
HEIGHT: 59 IN | TEMPERATURE: 97.5 F | WEIGHT: 141 LBS | BODY MASS INDEX: 28.43 KG/M2 | DIASTOLIC BLOOD PRESSURE: 66 MMHG | HEART RATE: 72 BPM | SYSTOLIC BLOOD PRESSURE: 102 MMHG

## 2017-06-20 DIAGNOSIS — R33.9 RETENTION OF URINE, UNSPECIFIED: ICD-10-CM

## 2017-06-20 LAB
ALBUMIN SERPL ELPH-MCNC: 4.3 G/DL
ALP BLD-CCNC: 66 U/L
ALT SERPL-CCNC: 7 U/L
ANION GAP SERPL CALC-SCNC: 12 MMOL/L
AST SERPL-CCNC: 16 U/L
BILIRUB SERPL-MCNC: 0.7 MG/DL
BUN SERPL-MCNC: 8 MG/DL
CALCIUM SERPL-MCNC: 9.8 MG/DL
CHLORIDE SERPL-SCNC: 99 MMOL/L
CO2 SERPL-SCNC: 27 MMOL/L
CREAT SERPL-MCNC: 0.74 MG/DL
GLUCOSE SERPL-MCNC: 101 MG/DL
POTASSIUM SERPL-SCNC: 4.8 MMOL/L
PROT SERPL-MCNC: 7.3 G/DL
SODIUM SERPL-SCNC: 138 MMOL/L

## 2017-06-21 ENCOUNTER — INPATIENT (INPATIENT)
Facility: HOSPITAL | Age: 77
LOS: 0 days | Discharge: HOME | DRG: 312 | End: 2017-06-22
Attending: INTERNAL MEDICINE | Admitting: INTERNAL MEDICINE
Payer: COMMERCIAL

## 2017-06-21 VITALS
SYSTOLIC BLOOD PRESSURE: 146 MMHG | DIASTOLIC BLOOD PRESSURE: 83 MMHG | TEMPERATURE: 98 F | RESPIRATION RATE: 18 BRPM | WEIGHT: 141.1 LBS | HEART RATE: 73 BPM | OXYGEN SATURATION: 97 % | HEIGHT: 58 IN

## 2017-06-21 DIAGNOSIS — I24.9 ACUTE ISCHEMIC HEART DISEASE, UNSPECIFIED: ICD-10-CM

## 2017-06-21 DIAGNOSIS — E11.9 TYPE 2 DIABETES MELLITUS WITHOUT COMPLICATIONS: ICD-10-CM

## 2017-06-21 DIAGNOSIS — E87.1 HYPO-OSMOLALITY AND HYPONATREMIA: ICD-10-CM

## 2017-06-21 DIAGNOSIS — Z90.49 ACQUIRED ABSENCE OF OTHER SPECIFIED PARTS OF DIGESTIVE TRACT: Chronic | ICD-10-CM

## 2017-06-21 DIAGNOSIS — N39.0 URINARY TRACT INFECTION, SITE NOT SPECIFIED: ICD-10-CM

## 2017-06-21 DIAGNOSIS — Z29.9 ENCOUNTER FOR PROPHYLACTIC MEASURES, UNSPECIFIED: ICD-10-CM

## 2017-06-21 DIAGNOSIS — R55 SYNCOPE AND COLLAPSE: ICD-10-CM

## 2017-06-21 DIAGNOSIS — Z98.890 OTHER SPECIFIED POSTPROCEDURAL STATES: Chronic | ICD-10-CM

## 2017-06-21 DIAGNOSIS — I10 ESSENTIAL (PRIMARY) HYPERTENSION: ICD-10-CM

## 2017-06-21 DIAGNOSIS — I77.9 DISORDER OF ARTERIES AND ARTERIOLES, UNSPECIFIED: ICD-10-CM

## 2017-06-21 DIAGNOSIS — R74.8 ABNORMAL LEVELS OF OTHER SERUM ENZYMES: ICD-10-CM

## 2017-06-21 PROBLEM — M79.7 FIBROMYALGIA: Chronic | Status: ACTIVE | Noted: 2017-01-14

## 2017-06-21 LAB
ALBUMIN SERPL ELPH-MCNC: 3.5 G/DL — SIGNIFICANT CHANGE UP (ref 3.5–5)
ALP SERPL-CCNC: 80 U/L — SIGNIFICANT CHANGE UP (ref 40–120)
ALT FLD-CCNC: 17 U/L DA — SIGNIFICANT CHANGE UP (ref 10–60)
ANION GAP SERPL CALC-SCNC: 10 MMOL/L — SIGNIFICANT CHANGE UP (ref 5–17)
ANION GAP SERPL CALC-SCNC: 10 MMOL/L — SIGNIFICANT CHANGE UP (ref 5–17)
ANION GAP SERPL CALC-SCNC: 11 MMOL/L — SIGNIFICANT CHANGE UP (ref 5–17)
ANION GAP SERPL CALC-SCNC: 11 MMOL/L — SIGNIFICANT CHANGE UP (ref 5–17)
APPEARANCE UR: CLEAR — SIGNIFICANT CHANGE UP
APPEARANCE: CLEAR
APTT BLD: 32.1 SEC — SIGNIFICANT CHANGE UP (ref 27.5–37.4)
AST SERPL-CCNC: 20 U/L — SIGNIFICANT CHANGE UP (ref 10–40)
BACTERIA: NEGATIVE
BASOPHILS # BLD AUTO: 0 K/UL — SIGNIFICANT CHANGE UP (ref 0–0.2)
BASOPHILS # BLD AUTO: 0.1 K/UL — SIGNIFICANT CHANGE UP (ref 0–0.2)
BASOPHILS NFR BLD AUTO: 0.5 % — SIGNIFICANT CHANGE UP (ref 0–2)
BASOPHILS NFR BLD AUTO: 0.7 % — SIGNIFICANT CHANGE UP (ref 0–2)
BILIRUB SERPL-MCNC: 0.7 MG/DL — SIGNIFICANT CHANGE UP (ref 0.2–1.2)
BILIRUB UR-MCNC: NEGATIVE — SIGNIFICANT CHANGE UP
BILIRUBIN URINE: NEGATIVE
BLOOD URINE: ABNORMAL
BUN SERPL-MCNC: 10 MG/DL — SIGNIFICANT CHANGE UP (ref 7–18)
BUN SERPL-MCNC: 12 MG/DL — SIGNIFICANT CHANGE UP (ref 7–18)
BUN SERPL-MCNC: 12 MG/DL — SIGNIFICANT CHANGE UP (ref 7–18)
BUN SERPL-MCNC: 13 MG/DL — SIGNIFICANT CHANGE UP (ref 7–18)
CALCIUM SERPL-MCNC: 8.2 MG/DL — LOW (ref 8.4–10.5)
CALCIUM SERPL-MCNC: 8.3 MG/DL — LOW (ref 8.4–10.5)
CALCIUM SERPL-MCNC: 8.7 MG/DL — SIGNIFICANT CHANGE UP (ref 8.4–10.5)
CALCIUM SERPL-MCNC: 8.8 MG/DL — SIGNIFICANT CHANGE UP (ref 8.4–10.5)
CHLORIDE SERPL-SCNC: 89 MMOL/L — LOW (ref 96–108)
CHLORIDE SERPL-SCNC: 90 MMOL/L — LOW (ref 96–108)
CHLORIDE SERPL-SCNC: 91 MMOL/L — LOW (ref 96–108)
CHLORIDE SERPL-SCNC: 95 MMOL/L — LOW (ref 96–108)
CHOLEST SERPL-MCNC: 135 MG/DL — SIGNIFICANT CHANGE UP (ref 10–199)
CK MB BLD-MCNC: 1.8 % — SIGNIFICANT CHANGE UP (ref 0–3.5)
CK MB BLD-MCNC: 2.1 % — SIGNIFICANT CHANGE UP (ref 0–3.5)
CK MB BLD-MCNC: 2.6 % — SIGNIFICANT CHANGE UP (ref 0–3.5)
CK MB CFR SERPL CALC: 1.8 NG/ML — SIGNIFICANT CHANGE UP (ref 0–3.6)
CK MB CFR SERPL CALC: 2.1 NG/ML — SIGNIFICANT CHANGE UP (ref 0–3.6)
CK MB CFR SERPL CALC: 2.2 NG/ML — SIGNIFICANT CHANGE UP (ref 0–3.6)
CK SERPL-CCNC: 103 U/L — SIGNIFICANT CHANGE UP (ref 21–215)
CK SERPL-CCNC: 81 U/L — SIGNIFICANT CHANGE UP (ref 21–215)
CK SERPL-CCNC: 99 U/L — SIGNIFICANT CHANGE UP (ref 21–215)
CO2 SERPL-SCNC: 25 MMOL/L — SIGNIFICANT CHANGE UP (ref 22–31)
CO2 SERPL-SCNC: 26 MMOL/L — SIGNIFICANT CHANGE UP (ref 22–31)
COLOR SPEC: YELLOW — SIGNIFICANT CHANGE UP
COLOR: YELLOW
CREAT SERPL-MCNC: 0.79 MG/DL — SIGNIFICANT CHANGE UP (ref 0.5–1.3)
CREAT SERPL-MCNC: 0.82 MG/DL — SIGNIFICANT CHANGE UP (ref 0.5–1.3)
CREAT SERPL-MCNC: 0.82 MG/DL — SIGNIFICANT CHANGE UP (ref 0.5–1.3)
CREAT SERPL-MCNC: 0.85 MG/DL — SIGNIFICANT CHANGE UP (ref 0.5–1.3)
DIFF PNL FLD: NEGATIVE — SIGNIFICANT CHANGE UP
EOSINOPHIL # BLD AUTO: 0 K/UL — SIGNIFICANT CHANGE UP (ref 0–0.5)
EOSINOPHIL # BLD AUTO: 0.2 K/UL — SIGNIFICANT CHANGE UP (ref 0–0.5)
EOSINOPHIL NFR BLD AUTO: 0.6 % — SIGNIFICANT CHANGE UP (ref 0–6)
EOSINOPHIL NFR BLD AUTO: 1.9 % — SIGNIFICANT CHANGE UP (ref 0–6)
GLUCOSE QUALITATIVE U: NORMAL MG/DL
GLUCOSE SERPL-MCNC: 138 MG/DL — HIGH (ref 70–99)
GLUCOSE SERPL-MCNC: 141 MG/DL — HIGH (ref 70–99)
GLUCOSE SERPL-MCNC: 194 MG/DL — HIGH (ref 70–99)
GLUCOSE SERPL-MCNC: 213 MG/DL — HIGH (ref 70–99)
GLUCOSE UR QL: NEGATIVE — SIGNIFICANT CHANGE UP
HBA1C BLD-MCNC: 6.6 % — HIGH (ref 4–5.6)
HCT VFR BLD CALC: 32.4 % — LOW (ref 34.5–45)
HCT VFR BLD CALC: 34.4 % — LOW (ref 34.5–45)
HDLC SERPL-MCNC: 81 MG/DL — SIGNIFICANT CHANGE UP (ref 40–125)
HGB BLD-MCNC: 11.9 G/DL — SIGNIFICANT CHANGE UP (ref 11.5–15.5)
HGB BLD-MCNC: 12.4 G/DL — SIGNIFICANT CHANGE UP (ref 11.5–15.5)
HYALINE CASTS: 1 /LPF
INR BLD: 0.96 RATIO — SIGNIFICANT CHANGE UP (ref 0.88–1.16)
KETONES UR-MCNC: NEGATIVE — SIGNIFICANT CHANGE UP
KETONES URINE: NEGATIVE
LEUKOCYTE ESTERASE UR-ACNC: ABNORMAL
LEUKOCYTE ESTERASE URINE: ABNORMAL
LIDOCAIN IGE QN: 363 U/L — SIGNIFICANT CHANGE UP (ref 73–393)
LIPID PNL WITH DIRECT LDL SERPL: 38 MG/DL — SIGNIFICANT CHANGE UP
LYMPHOCYTES # BLD AUTO: 1.5 K/UL — SIGNIFICANT CHANGE UP (ref 1–3.3)
LYMPHOCYTES # BLD AUTO: 1.8 K/UL — SIGNIFICANT CHANGE UP (ref 1–3.3)
LYMPHOCYTES # BLD AUTO: 16.3 % — SIGNIFICANT CHANGE UP (ref 13–44)
LYMPHOCYTES # BLD AUTO: 27.8 % — SIGNIFICANT CHANGE UP (ref 13–44)
MCHC RBC-ENTMCNC: 30.6 PG — SIGNIFICANT CHANGE UP (ref 27–34)
MCHC RBC-ENTMCNC: 31.5 PG — SIGNIFICANT CHANGE UP (ref 27–34)
MCHC RBC-ENTMCNC: 36 GM/DL — SIGNIFICANT CHANGE UP (ref 32–36)
MCHC RBC-ENTMCNC: 36.7 GM/DL — HIGH (ref 32–36)
MCV RBC AUTO: 85 FL — SIGNIFICANT CHANGE UP (ref 80–100)
MCV RBC AUTO: 86 FL — SIGNIFICANT CHANGE UP (ref 80–100)
MICROSCOPIC-UA: NORMAL
MONOCYTES # BLD AUTO: 0.3 K/UL — SIGNIFICANT CHANGE UP (ref 0–0.9)
MONOCYTES # BLD AUTO: 0.5 K/UL — SIGNIFICANT CHANGE UP (ref 0–0.9)
MONOCYTES NFR BLD AUTO: 4.9 % — SIGNIFICANT CHANGE UP (ref 2–14)
MONOCYTES NFR BLD AUTO: 5.5 % — SIGNIFICANT CHANGE UP (ref 2–14)
NEUTROPHILS # BLD AUTO: 4.4 K/UL — SIGNIFICANT CHANGE UP (ref 1.8–7.4)
NEUTROPHILS # BLD AUTO: 6.8 K/UL — SIGNIFICANT CHANGE UP (ref 1.8–7.4)
NEUTROPHILS NFR BLD AUTO: 66.3 % — SIGNIFICANT CHANGE UP (ref 43–77)
NEUTROPHILS NFR BLD AUTO: 75.7 % — SIGNIFICANT CHANGE UP (ref 43–77)
NITRITE UR-MCNC: NEGATIVE — SIGNIFICANT CHANGE UP
NITRITE URINE: POSITIVE
OSMOLALITY SERPL: 269 MOS/KG — LOW (ref 275–300)
OSMOLALITY UR: 266 MOS/KG — SIGNIFICANT CHANGE UP (ref 50–1200)
PH UR: 7 — SIGNIFICANT CHANGE UP (ref 5–8)
PH URINE: 6.5
PLATELET # BLD AUTO: 241 K/UL — SIGNIFICANT CHANGE UP (ref 150–400)
PLATELET # BLD AUTO: 266 K/UL — SIGNIFICANT CHANGE UP (ref 150–400)
POTASSIUM SERPL-MCNC: 3.3 MMOL/L — LOW (ref 3.5–5.3)
POTASSIUM SERPL-MCNC: 3.4 MMOL/L — LOW (ref 3.5–5.3)
POTASSIUM SERPL-MCNC: 3.6 MMOL/L — SIGNIFICANT CHANGE UP (ref 3.5–5.3)
POTASSIUM SERPL-MCNC: 3.8 MMOL/L — SIGNIFICANT CHANGE UP (ref 3.5–5.3)
POTASSIUM SERPL-SCNC: 3.3 MMOL/L — LOW (ref 3.5–5.3)
POTASSIUM SERPL-SCNC: 3.4 MMOL/L — LOW (ref 3.5–5.3)
POTASSIUM SERPL-SCNC: 3.6 MMOL/L — SIGNIFICANT CHANGE UP (ref 3.5–5.3)
POTASSIUM SERPL-SCNC: 3.8 MMOL/L — SIGNIFICANT CHANGE UP (ref 3.5–5.3)
PROT SERPL-MCNC: 7.7 G/DL — SIGNIFICANT CHANGE UP (ref 6–8.3)
PROT UR-MCNC: NEGATIVE — SIGNIFICANT CHANGE UP
PROTEIN URINE: NEGATIVE MG/DL
PROTHROM AB SERPL-ACNC: 10.5 SEC — SIGNIFICANT CHANGE UP (ref 9.8–12.7)
RBC # BLD: 3.76 M/UL — LOW (ref 3.8–5.2)
RBC # BLD: 4.05 M/UL — SIGNIFICANT CHANGE UP (ref 3.8–5.2)
RBC # FLD: 13.7 % — SIGNIFICANT CHANGE UP (ref 10.3–14.5)
RBC # FLD: 14.4 % — SIGNIFICANT CHANGE UP (ref 10.3–14.5)
RED BLOOD CELLS URINE: 2 /HPF
SODIUM SERPL-SCNC: 125 MMOL/L — LOW (ref 135–145)
SODIUM SERPL-SCNC: 126 MMOL/L — LOW (ref 135–145)
SODIUM SERPL-SCNC: 128 MMOL/L — LOW (ref 135–145)
SODIUM SERPL-SCNC: 131 MMOL/L — LOW (ref 135–145)
SODIUM UR-SCNC: 85 MMOL/L — SIGNIFICANT CHANGE UP (ref 40–220)
SP GR SPEC: 1 — LOW (ref 1.01–1.02)
SPECIFIC GRAVITY URINE: 1.01
SQUAMOUS EPITHELIAL CELLS: 0 /HPF
TOTAL CHOLESTEROL/HDL RATIO MEASUREMENT: 1.7 RATIO — LOW (ref 3.3–7.1)
TRIGL SERPL-MCNC: 79 MG/DL — SIGNIFICANT CHANGE UP (ref 10–149)
TROPONIN I SERPL-MCNC: 0.16 NG/ML — HIGH (ref 0–0.04)
TROPONIN I SERPL-MCNC: 0.17 NG/ML — HIGH (ref 0–0.04)
TROPONIN I SERPL-MCNC: 0.17 NG/ML — HIGH (ref 0–0.04)
TSH SERPL-MCNC: 0.35 UU/ML — SIGNIFICANT CHANGE UP (ref 0.34–4.82)
UROBILINOGEN FLD QL: NEGATIVE — SIGNIFICANT CHANGE UP
UROBILINOGEN URINE: NORMAL MG/DL
WBC # BLD: 6.6 K/UL — SIGNIFICANT CHANGE UP (ref 3.8–10.5)
WBC # BLD: 8.9 K/UL — SIGNIFICANT CHANGE UP (ref 3.8–10.5)
WBC # FLD AUTO: 6.6 K/UL — SIGNIFICANT CHANGE UP (ref 3.8–10.5)
WBC # FLD AUTO: 8.9 K/UL — SIGNIFICANT CHANGE UP (ref 3.8–10.5)
WHITE BLOOD CELLS URINE: 119 /HPF

## 2017-06-21 PROCEDURE — 99285 EMERGENCY DEPT VISIT HI MDM: CPT | Mod: 25

## 2017-06-21 PROCEDURE — 71010: CPT | Mod: 26

## 2017-06-21 PROCEDURE — 70450 CT HEAD/BRAIN W/O DYE: CPT | Mod: 26

## 2017-06-21 PROCEDURE — 99223 1ST HOSP IP/OBS HIGH 75: CPT | Mod: GC

## 2017-06-21 RX ORDER — ATORVASTATIN CALCIUM 80 MG/1
40 TABLET, FILM COATED ORAL AT BEDTIME
Qty: 0 | Refills: 0 | Status: DISCONTINUED | OUTPATIENT
Start: 2017-06-21 | End: 2017-06-22

## 2017-06-21 RX ORDER — SODIUM CHLORIDE 9 MG/ML
3 INJECTION INTRAMUSCULAR; INTRAVENOUS; SUBCUTANEOUS ONCE
Qty: 0 | Refills: 0 | Status: COMPLETED | OUTPATIENT
Start: 2017-06-21 | End: 2017-06-21

## 2017-06-21 RX ORDER — DEXTROSE 50 % IN WATER 50 %
25 SYRINGE (ML) INTRAVENOUS ONCE
Qty: 0 | Refills: 0 | Status: DISCONTINUED | OUTPATIENT
Start: 2017-06-21 | End: 2017-06-22

## 2017-06-21 RX ORDER — GLUCAGON INJECTION, SOLUTION 0.5 MG/.1ML
1 INJECTION, SOLUTION SUBCUTANEOUS ONCE
Qty: 0 | Refills: 0 | Status: DISCONTINUED | OUTPATIENT
Start: 2017-06-21 | End: 2017-06-22

## 2017-06-21 RX ORDER — LOSARTAN POTASSIUM 100 MG/1
50 TABLET, FILM COATED ORAL DAILY
Qty: 0 | Refills: 0 | Status: DISCONTINUED | OUTPATIENT
Start: 2017-06-21 | End: 2017-06-22

## 2017-06-21 RX ORDER — CEFTRIAXONE 500 MG/1
1 INJECTION, POWDER, FOR SOLUTION INTRAMUSCULAR; INTRAVENOUS ONCE
Qty: 0 | Refills: 0 | Status: COMPLETED | OUTPATIENT
Start: 2017-06-21 | End: 2017-06-21

## 2017-06-21 RX ORDER — POTASSIUM CHLORIDE 20 MEQ
40 PACKET (EA) ORAL ONCE
Qty: 0 | Refills: 0 | Status: COMPLETED | OUTPATIENT
Start: 2017-06-21 | End: 2017-06-21

## 2017-06-21 RX ORDER — SODIUM CHLORIDE 9 MG/ML
500 INJECTION INTRAMUSCULAR; INTRAVENOUS; SUBCUTANEOUS ONCE
Qty: 0 | Refills: 0 | Status: COMPLETED | OUTPATIENT
Start: 2017-06-21 | End: 2017-06-21

## 2017-06-21 RX ORDER — SODIUM CHLORIDE 9 MG/ML
1000 INJECTION, SOLUTION INTRAVENOUS
Qty: 0 | Refills: 0 | Status: DISCONTINUED | OUTPATIENT
Start: 2017-06-21 | End: 2017-06-22

## 2017-06-21 RX ORDER — ENOXAPARIN SODIUM 100 MG/ML
60 INJECTION SUBCUTANEOUS ONCE
Qty: 0 | Refills: 0 | Status: DISCONTINUED | OUTPATIENT
Start: 2017-06-21 | End: 2017-06-21

## 2017-06-21 RX ORDER — DEXTROSE 50 % IN WATER 50 %
12.5 SYRINGE (ML) INTRAVENOUS ONCE
Qty: 0 | Refills: 0 | Status: DISCONTINUED | OUTPATIENT
Start: 2017-06-21 | End: 2017-06-22

## 2017-06-21 RX ORDER — CEFTRIAXONE 500 MG/1
INJECTION, POWDER, FOR SOLUTION INTRAMUSCULAR; INTRAVENOUS
Qty: 0 | Refills: 0 | Status: DISCONTINUED | OUTPATIENT
Start: 2017-06-21 | End: 2017-06-22

## 2017-06-21 RX ORDER — ASPIRIN/CALCIUM CARB/MAGNESIUM 324 MG
162 TABLET ORAL DAILY
Qty: 0 | Refills: 0 | Status: DISCONTINUED | OUTPATIENT
Start: 2017-06-21 | End: 2017-06-22

## 2017-06-21 RX ORDER — DEXTROSE 50 % IN WATER 50 %
1 SYRINGE (ML) INTRAVENOUS ONCE
Qty: 0 | Refills: 0 | Status: DISCONTINUED | OUTPATIENT
Start: 2017-06-21 | End: 2017-06-22

## 2017-06-21 RX ORDER — INSULIN LISPRO 100/ML
VIAL (ML) SUBCUTANEOUS
Qty: 0 | Refills: 0 | Status: DISCONTINUED | OUTPATIENT
Start: 2017-06-21 | End: 2017-06-22

## 2017-06-21 RX ORDER — CEFTRIAXONE 500 MG/1
1 INJECTION, POWDER, FOR SOLUTION INTRAMUSCULAR; INTRAVENOUS EVERY 24 HOURS
Qty: 0 | Refills: 0 | Status: DISCONTINUED | OUTPATIENT
Start: 2017-06-22 | End: 2017-06-22

## 2017-06-21 RX ORDER — HEPARIN SODIUM 5000 [USP'U]/ML
INJECTION INTRAVENOUS; SUBCUTANEOUS
Qty: 25000 | Refills: 0 | Status: DISCONTINUED | OUTPATIENT
Start: 2017-06-21 | End: 2017-06-21

## 2017-06-21 RX ADMIN — Medication 2: at 12:44

## 2017-06-21 RX ADMIN — SODIUM CHLORIDE 3 MILLILITER(S): 9 INJECTION INTRAMUSCULAR; INTRAVENOUS; SUBCUTANEOUS at 03:27

## 2017-06-21 RX ADMIN — HEPARIN SODIUM 750 UNIT(S)/HR: 5000 INJECTION INTRAVENOUS; SUBCUTANEOUS at 05:37

## 2017-06-21 RX ADMIN — ATORVASTATIN CALCIUM 40 MILLIGRAM(S): 80 TABLET, FILM COATED ORAL at 21:17

## 2017-06-21 RX ADMIN — Medication 1: at 08:18

## 2017-06-21 RX ADMIN — SODIUM CHLORIDE 1000 MILLILITER(S): 9 INJECTION INTRAMUSCULAR; INTRAVENOUS; SUBCUTANEOUS at 20:47

## 2017-06-21 RX ADMIN — CEFTRIAXONE 100 GRAM(S): 500 INJECTION, POWDER, FOR SOLUTION INTRAMUSCULAR; INTRAVENOUS at 18:26

## 2017-06-21 RX ADMIN — Medication 162 MILLIGRAM(S): at 05:57

## 2017-06-21 RX ADMIN — Medication 40 MILLIEQUIVALENT(S): at 21:17

## 2017-06-21 NOTE — CONSULT NOTE ADULT - SUBJECTIVE AND OBJECTIVE BOX
Time of visit:0900    CHIEF COMPLAINT: Patient is a 76y old  Female who presents with a chief complaint of "passed out" (2017 09:56)      HPI:  Patient is a 76 year-old female from home (lives with son) with PMH of CVA, small SAH, HTN, DM type II, carotid stenosis s/p endarterectomy, fibromyalgia who presents after an episode of loss of consciousness. Yesterday, she saw her urologist for mild burning with urination and (2017 09:56)   Patient seen and examined.     PAST MEDICAL & SURGICAL HISTORY:  Heart murmur  Type 2 diabetes mellitus  Essential hypertension  DM (diabetes mellitus)  HTN (hypertension)  Fibromyalgia  H/O carotid endarterectomy  History of appendectomy  No significant past surgical history      Allergies    No Known Allergies    Intolerances        MEDICATIONS  (STANDING):  aspirin  chewable 162milliGRAM(s) Oral daily  insulin lispro (HumaLOG) corrective regimen sliding scale  SubCutaneous three times a day before meals  dextrose 5%. 1000milliLiter(s) IV Continuous <Continuous>  dextrose 50% Injectable 12.5Gram(s) IV Push once  dextrose 50% Injectable 25Gram(s) IV Push once  dextrose 50% Injectable 25Gram(s) IV Push once  atorvastatin 40milliGRAM(s) Oral at bedtime  losartan 50milliGRAM(s) Oral daily      MEDICATIONS  (PRN):  dextrose Gel 1Dose(s) Oral once PRN Blood Glucose LESS THAN 70 milliGRAM(s)/deciLiter  glucagon  Injectable 1milliGRAM(s) IntraMuscular once PRN Glucose <70 milliGRAM(s)/deciLiter       Medications up to date at time of exam.    FAMILY HISTORY:  Family history of acute myocardial infarction  Family history of cerebrovascular accident (CVA) (Sibling)      SOCIAL HISTORY  Smoking History: [   ] smoking/smoke exposure, [   ] former smoker, [  x ] denies smoking  Living Condition: [   ] apartment, [   ] private house  Work History:   Travel History: denies recent travel  Illicit Substance Use: denies  Alcohol Use: denies    REVIEW OF SYSTEMS:    CONSTITUTIONAL:  denies fevers, chills, sweats, weight loss    HEENT:  denies diplopia or blurred vision, sore throat or runny nose.    CARDIOVASCULAR:  denies pressure, squeezing, tightness, or heaviness about the chest; no palpitations.    RESPIRATORY:  denies SOB, cough, DAVIS, wheezing.    GASTROINTESTINAL:  denies abdominal pain, nausea, vomiting or diarrhea.    GENITOURINARY: denies dysuria, frequency or urgency.    NEUROLOGIC:  denies numbness, tingling, seizures or weakness.    PSYCHIATRIC:  denies disorder of thought or mood.    MSK: denies swelling, redness      PHYSICAL EXAMINATION:    GENERAL: The patient is a well-developed, well-nourished, in no apparent distress.     Vital Signs Last 24 Hrs  T(C): 36.3, Max: 36.6 ( @ 04:21)  T(F): 97.4, Max: 97.8 ( @ 04:21)  HR: 86 (73 - 100)  BP: 148/76 (139/61 - 163/80)  BP(mean): --  RR: 20 (18 - 20)  SpO2: 99% (97% - 100%)    HEENT: head is normocephalic and atraumatic.     NECK: supple, no palpable adenopathy.    LUNGS: clear to auscultation, no wheezing, rales, or rhonchi.    HEART: regular rate and rhythm + murmur.    ABDOMEN: soft, nontender, and nondistended.     EXTREMITIES: without any cyanosis, clubbing, rash, lesions or edema.    NEUROLOGIC: awake, alert.    SKIN: warm, dry, good turgor.      LABS:                        12.4   6.6   )-----------( 266      ( 2017 10:58 )             34.4         126<L>  |  90<L>  |  10  ----------------------------<  213<H>  3.3<L>   |  25  |  0.79    Ca    8.7      2017 10:58    TPro  7.7  /  Alb  3.5  /  TBili  0.7  /  DBili  x   /  AST  20  /  ALT  17  /  AlkPhos  80      PT/INR - ( 2017 03:21 )   PT: 10.5 sec;   INR: 0.96 ratio         PTT - ( 2017 03:21 )  PTT:32.1 sec  Urinalysis Basic - ( 2017 06:59 )    Color: Yellow / Appearance: Clear / S.005 / pH: x  Gluc: x / Ketone: Negative  / Bili: Negative / Urobili: Negative   Blood: x / Protein: Negative / Nitrite: Negative   Leuk Esterase: Trace / RBC: 0-2 /HPF / WBC 6-10 /HPF   Sq Epi: x / Non Sq Epi: Occasional / Bacteria: Trace /HPF        CARDIAC MARKERS ( 2017 10:58 )  0.162 ng/mL / x     / 103 U/L / x     / 2.2 ng/mL  CARDIAC MARKERS ( 2017 03:21 )  0.175 ng/mL / x     / 81 U/L / x     / 2.1 ng/mL                Troponin 0.162 06-21 @ 10:58   06-21 @ 10:58  CKMB -- - @ 10:58  Troponin 0.175 -21 @ 03:21  CK 81 -21 @ 03:21  CKMB --  @ 03:21  .    MICROBIOLOGY: (if applicable)    RADIOLOGY & ADDITIONAL STUDIES:  EKG:   CXR:  ECHO:  TELE: no acute events    IMPRESSION: 76y Female PAST MEDICAL & SURGICAL HISTORY:  Heart murmur  Type 2 diabetes mellitus  Essential hypertension  DM (diabetes mellitus)  HTN (hypertension)  Fibromyalgia  H/O carotid endarterectomy  History of appendectomy  No significant past surgical history       p/w syncopal episode, most likely vasal vagal     RECOMMENDATIONS:     - troponin most likely 2/2 demand ischemia   - con't tele monitoring, EKG SR   - check orthostatics   - CT head negative hemorrhage or infarct   - recommend 2D echo

## 2017-06-21 NOTE — H&P ADULT - PROBLEM SELECTOR PLAN 3
- patient has complaints of mild dysuria and urinary frequency; had 1 dose of ciprofloxacin yesterday  - afebrile, no leukocytosis; UA mildly positive but patient already received antibiotics  - send urine culture  - continue rocephin for 2 more days - hyponatremia to 126 likely due to thiazide diuretic; no mental status changes   - will give 500cc NS bolus and repeat BMP

## 2017-06-21 NOTE — PHYSICAL THERAPY INITIAL EVALUATION ADULT - LEVEL OF INDEPENDENCE: STAIR NEGOTIATION, REHAB EVAL
unable to perform/Unable to safely assess patient on stairs at this time. Pt does not exhibit appropriate pre-requisite skills to safely negotiate unlevel surfaces due to (poor endurance,decreased muscle strength,decreased balance,decreased safety awareness) which will put patient at significant risks for falls and injury.

## 2017-06-21 NOTE — H&P ADULT - PROBLEM SELECTOR PLAN 4
- continue aspirin 162mg daily  - crestor non-formulary, continue atorvastatin 40mg daily   - lipid profile within normal limits - patient has complaints of mild dysuria and urinary frequency; had 1 dose of ciprofloxacin yesterday  - afebrile, no leukocytosis; UA mildly positive but patient already received antibiotics  - send urine culture  - continue rocephin for 2 more days

## 2017-06-21 NOTE — DISCHARGE NOTE ADULT - PLAN OF CARE
to treat it CT negative for acute pathology and symptom improved. Follow up with PCP in a week. Fall precaution. Continue home medication as prescribed. Follow up PCP in a week. Monitor BP daily. DASH diet. Continue home oral hypoglycemic agent. Your HbA1C is 6.6. Good control range. Continue Ceftin for 7 more days as prescribed.

## 2017-06-21 NOTE — ED PROVIDER NOTE - CHPI ED SYMPTOMS NEG
no CP, SOB, HA, diaphoresis, dizziness, visual aura, neck stiffness, changes in speech/vision/hearing, numbness, tingling

## 2017-06-21 NOTE — H&P ADULT - PROBLEM SELECTOR PLAN 5
- continue home losartan 50mg daily  - hold hydrochlorothiazide as patient appears mildly dehydrated - continue aspirin 162mg daily  - crestor non-formulary, continue atorvastatin 40mg daily   - lipid profile within normal limits

## 2017-06-21 NOTE — PHYSICAL THERAPY INITIAL EVALUATION ADULT - GENERAL OBSERVATIONS, REHAB EVAL
Pt. seen today for PT; received pt. sitting in her chair; son present during visit. Pt. w/ IV line and monitor. Pt. is AxOX3.

## 2017-06-21 NOTE — H&P ADULT - PROBLEM SELECTOR PLAN 6
- hold home metformin  - monitor accuchecks  - humalog sliding scale  - HbA1C is 6.6% - continue home losartan 50mg daily  - hold hydrochlorothiazide as patient appears mildly dehydrated

## 2017-06-21 NOTE — H&P ADULT - ATTENDING COMMENTS
Patient was admitted after a syncopal episode.   She  had had a large bowel movement, felt dizzy, then briefly lost consciousness.  Subsequently she vomited.   Now she is WDWN in NAD  Lungs, clear  Cor, irreg      Troponin I, Serum: 0.162:   Troponin I, Serum: 0.175:     Sodium 126  Potassium 3.3    IMP:  Discussed with cardiology:   syncope is likely vasovagal           Hyponatremia-- patient was taking hydrochlorthiazide at home.  Last admission hyponatremia resolved with stopping hydrochlorthiazide.   hypokalemia, also likely secondary to hydrochlorthiazide.     Plan:  Observe on telemetry            Discontinue hydrochlorthiazide.

## 2017-06-21 NOTE — H&P ADULT - ASSESSMENT
Patient is a 76 year-old female with PMH of CVA, small SAH, HTN, DM type II, carotid stenosis s/p endarterectomy, fibromyalgia who presents after an episode of loss of consciousness and is admitted for further evaluation of syncopal episode.

## 2017-06-21 NOTE — H&P ADULT - PROBLEM SELECTOR PLAN 1
- CT head negative for infarct, hemorrhage  - no focal neurological deficits on physical exam  - admit to telemetry to monitor for arrythmias  - follow up TTE   - cardiology: Dr. Abebe

## 2017-06-21 NOTE — DISCHARGE NOTE ADULT - MEDICATION SUMMARY - MEDICATIONS TO STOP TAKING
I will STOP taking the medications listed below when I get home from the hospital:    lidocaine 5% topical film  -- Apply on skin to affected area once a day    losartan 25 mg oral tablet  -- 1 tab(s) by mouth once a day I will STOP taking the medications listed below when I get home from the hospital:    lidocaine 5% topical film  -- Apply on skin to affected area once a day    Hiprex hippurate 1 g oral tablet  -- Take 1/2 tab by mouth 2 times a day  -- Finish all this medication unless otherwise directed by prescriber.  Medication should be taken with plenty of water.  Obtain medical advice before taking any non-prescription drugs as some may affect the action of this medication.  Take with food or milk.    losartan 25 mg oral tablet  -- 1 tab(s) by mouth once a day    losartan-hydrochlorothiazide 50mg-12.5mg oral tablet  -- 1 tab(s) by mouth once a day

## 2017-06-21 NOTE — DISCHARGE NOTE ADULT - NS AS DC STROKE ED MATERIALS
Stroke Warning Signs and Symptoms/Need for Followup After Discharge/Call 911 for Stroke/Stroke Education Booklet/Risk Factors for Stroke/Prescribed Medications

## 2017-06-21 NOTE — ED ADULT NURSE NOTE - OBJECTIVE STATEMENT
Patient had a syncopal episode at home in bathroom, witnessed by son, lost of consciousness for a couple of minuts <3minutes as per son, vomited X1

## 2017-06-21 NOTE — DISCHARGE NOTE ADULT - CARE PROVIDER_API CALL
Yung Abebe), Cardiology Medicine  [address] [place] [address]  Phone: [phone]  Fax: [phone]

## 2017-06-21 NOTE — H&P ADULT - FAMILY HISTORY
<<-----Click on this checkbox to enter Family History Family history of acute myocardial infarction     Sibling  Still living? Unknown  Family history of cerebrovascular accident (CVA), Age at diagnosis: Age Unknown

## 2017-06-21 NOTE — DISCHARGE NOTE ADULT - MEDICATION SUMMARY - MEDICATIONS TO TAKE
I will START or STAY ON the medications listed below when I get home from the hospital:    Rolling walker  -- Indication: For Unsteadiness    aspirin 81 mg oral tablet  -- 1 tab(s) by mouth 2 times a day  -- Indication: For Cardioprotective    metFORMIN 750 mg oral tablet, extended release  -- 1  by mouth 2 times a day  -- Indication: For Diabetes    Crestor 10 mg oral tablet  -- 1 tab(s) by mouth once a day (at bedtime)  -- Indication: For Cardioprotective    losartan-hydrochlorothiazide 50mg-12.5mg oral tablet  -- 1 tab(s) by mouth once a day  -- Indication: For HTN    Ceftin 250 mg oral tablet  -- 1 tab(s) by mouth 2 times a day  -- Finish all this medication unless otherwise directed by prescriber.  Medication should be taken with plenty of water.  Take with food or milk.    -- Indication: For UTI (urinary tract infection)    Hiprex hippurate 1 g oral tablet  -- Take 1/2 tab by mouth 2 times a day  -- Finish all this medication unless otherwise directed by prescriber.  Medication should be taken with plenty of water.  Obtain medical advice before taking any non-prescription drugs as some may affect the action of this medication.  Take with food or milk.    -- Indication: For UTI (urinary tract infection) I will START or STAY ON the medications listed below when I get home from the hospital:    Rolling walker  -- Indication: For Unsteadiness    aspirin 81 mg oral tablet  -- 1 tab(s) by mouth 2 times a day  -- Indication: For Cardioprotective    losartan 50 mg oral tablet  -- 1 tab(s) by mouth once a day  -- Do not take this drug if you are pregnant.  It is very important that you take or use this exactly as directed.  Do not skip doses or discontinue unless directed by your doctor.  Some non-prescription drugs may aggravate your condition.  Read all labels carefully.  If a warning appears, check with your doctor before taking.    -- Indication: For HTN    metFORMIN 750 mg oral tablet, extended release  -- 1  by mouth 2 times a day  -- Indication: For Diabetes    Crestor 10 mg oral tablet  -- 1 tab(s) by mouth once a day (at bedtime)  -- Indication: For Cardioprotective    Ceftin 250 mg oral tablet  -- 1 tab(s) by mouth 2 times a day  -- Finish all this medication unless otherwise directed by prescriber.  Medication should be taken with plenty of water.  Take with food or milk.    -- Indication: For UTI (urinary tract infection)

## 2017-06-21 NOTE — DISCHARGE NOTE ADULT - HOSPITAL COURSE
76 year-old female from home (lives with son) with PMH of CVA, small SAH, HTN, DM type II, carotid stenosis s/p endarterectomy, fibromyalgia who presents after an episode of loss of consciousness. Yesterday, she saw her urologist for mild burning with urination and urinary frequency, was given ciprofloxacin. Today she had a bowel movement, felt weak, and began to walk to her bed. She said she sat down on her bed and called her son. He took her blood pressure and it was 130/88. She proceeded to lose consciousness for 2 minutes during which she vomited (NBNB, as per son) one time. She then woke up. She had no confusion after she regained consciousness. Her son checked BP after she regained consciousness and it was 90/60. She did not have slurred speech, one-sided weakness, vision changes, facial droop, difficulty understanding. She denies fevers, chills, headache, chest pain, shortness of breath, abdominal pain, recent travel, sick contacts.    Admitted for vasovagal syncope but to ruled out cardiac and neuro cause of syncope. Patient was on tele monitor for 24 hour with no significant event. EKG with no acute changes in NSR. Cardiac enzyme negative for 2 set. Recent echo with 55% EF. No need to repeat echo as per cardio. Patient symptom improved inpatient. CT negative for acute pathology. UA positive and on ABX for UTI on previous admission. Put on rocephin, will continue ceftin for 7 more days. HTN and DM well controlled inpatient. home medication was continued for HTN and HSS used for DM. Discussed with 76 year-old female from home (lives with son) with PMH of CVA, small SAH, HTN, DM type II, carotid stenosis s/p endarterectomy, fibromyalgia who presents after an episode of loss of consciousness. Yesterday, she saw her urologist for mild burning with urination and urinary frequency, was given ciprofloxacin. Today she had a bowel movement, felt weak, and began to walk to her bed. She said she sat down on her bed and called her son. He took her blood pressure and it was 130/88. She proceeded to lose consciousness for 2 minutes during which she vomited (NBNB, as per son) one time. She then woke up. She had no confusion after she regained consciousness. Her son checked BP after she regained consciousness and it was 90/60. She did not have slurred speech, one-sided weakness, vision changes, facial droop, difficulty understanding. She denies fevers, chills, headache, chest pain, shortness of breath, abdominal pain, recent travel, sick contacts.    Admitted for vasovagal syncope but to ruled out cardiac and neuro cause of syncope. Patient was on tele monitor for 24 hour with no significant event. EKG with no acute changes in NSR. Cardiac enzyme negative for 2 set. Recent echo with 55% EF. No need to repeat echo as per cardio. Patient symptom improved inpatient. CT negative for acute pathology. UA positive and on ABX for UTI on previous admission. Put on rocephin, will continue ceftin for 7 more days. HTN and DM well controlled inpatient. home medication was continued for HTN and HSS used for DM. Discussed with attending and medically stable to be discharged home. Discussed with Physical therapist.

## 2017-06-21 NOTE — H&P ADULT - PROBLEM SELECTOR PLAN 7
- improved VTE risk score 1 - hold home metformin  - monitor accuchecks  - humalog sliding scale  - HbA1C is 6.6%

## 2017-06-21 NOTE — H&P ADULT - PROBLEM SELECTOR PLAN 2
- likely due to demand ischemia  - EKG shows RBBB (old), NSR 90bpm  - cardiac enzymes show troponin of 0.176, trended downwards to 0.162  - continue telemetry monitoring

## 2017-06-21 NOTE — H&P ADULT - HISTORY OF PRESENT ILLNESS
Patient is a 76 year-old female from home (lives with son) with PMH of CVA, small SAH, HTN, DM type II, carotid stenosis s/p endarterectomy, fibromyalgia who presents after an episode of loss of consciousness. Yesterday, she saw her urologist for mild burning with urination and Patient is a 76 year-old female from home (lives with son) with PMH of CVA, small SAH, HTN, DM type II, carotid stenosis s/p endarterectomy, fibromyalgia who presents after an episode of loss of consciousness. Yesterday, she saw her urologist for mild burning with urination and urinary frequency, was given ciprofloxacin. Today she had a bowel movement, felt weak, and began to walk to her bed. She said she sat down on her bed and called her son. He took her blood pressure and it was 130/88. She proceeded to lose consciousness for 2 minutes during which she vomited (NBNB, as per son) one time. She then woke up. She had no confusion after she regained consciousness. Her son checked BP after she regained consciousness and it was 90/60. She did not have slurred speech, one-sided weakness, vision changes, facial droop, difficulty understanding. She denies fevers, chills, headache, chest pain, shortness of breath, abdominal pain, recent travel, sick contacts.

## 2017-06-21 NOTE — DISCHARGE NOTE ADULT - CARE PLAN
Principal Discharge DX:	Vasovagal syncope  Goal:	to treat it  Instructions for follow-up, activity and diet:	CT negative for acute pathology and symptom improved. Follow up with PCP in a week. Fall precaution.  Secondary Diagnosis:	Essential hypertension  Instructions for follow-up, activity and diet:	Continue home medication as prescribed. Follow up PCP in a week. Monitor BP daily. DASH diet.  Secondary Diagnosis:	Type 2 diabetes mellitus  Instructions for follow-up, activity and diet:	Continue home oral hypoglycemic agent. Your HbA1C is 6.6. Good control range.  Secondary Diagnosis:	UTI (urinary tract infection)  Instructions for follow-up, activity and diet:	Continue Ceftin for 7 more days as prescribed.

## 2017-06-21 NOTE — ED PROVIDER NOTE - OBJECTIVE STATEMENT
77 y/o female with significant PMHx of TIA (x2), HTN, DM, heart murmur, and PSHx of carotid endarterectomy (done on 5/15/2017), BIB son to the ED c/o syncopal episode x today. Pt reports she called son stating she had to go to bathroom: pt had BM and reported she felt generalized weakness. Son reports pt syncopized and had 1 episode of NBNB vomiting; similar to past episodes where pt would have syncopal episode and vomit s/p. Son took blood pressure: 90/60 s/p syncope. Pt denies CP, dizziness or dyspnea prior to syncopal episode. Son reports pt is taking Cipro for possible UTI; has increased urine frequency. Pt denies CP, SOB, HA, diaphoresis, dizziness, visual aura, neck stiffness, changes in speech/vision/hearing, numbness, tingling, use of drugs/ETOH/cigarettes, or any other complaints. NKDA. Currently taking: HCTZ, ASA, Metformin, Crestor.

## 2017-06-21 NOTE — DISCHARGE NOTE ADULT - PATIENT PORTAL LINK FT
“You can access the [facility] Patient Portal, offered by [facility], by registering with the following website: [email or web address]”

## 2017-06-22 VITALS
HEART RATE: 78 BPM | OXYGEN SATURATION: 100 % | RESPIRATION RATE: 18 BRPM | SYSTOLIC BLOOD PRESSURE: 121 MMHG | TEMPERATURE: 99 F | DIASTOLIC BLOOD PRESSURE: 52 MMHG

## 2017-06-22 DIAGNOSIS — R55 SYNCOPE AND COLLAPSE: ICD-10-CM

## 2017-06-22 LAB
ANION GAP SERPL CALC-SCNC: 14 MMOL/L — SIGNIFICANT CHANGE UP (ref 5–17)
BUN SERPL-MCNC: 12 MG/DL — SIGNIFICANT CHANGE UP (ref 7–18)
CALCIUM SERPL-MCNC: 9 MG/DL — SIGNIFICANT CHANGE UP (ref 8.4–10.5)
CHLORIDE SERPL-SCNC: 99 MMOL/L — SIGNIFICANT CHANGE UP (ref 96–108)
CO2 SERPL-SCNC: 20 MMOL/L — LOW (ref 22–31)
CREAT SERPL-MCNC: 0.89 MG/DL — SIGNIFICANT CHANGE UP (ref 0.5–1.3)
CULTURE RESULTS: NO GROWTH — SIGNIFICANT CHANGE UP
GLUCOSE SERPL-MCNC: 201 MG/DL — HIGH (ref 70–99)
HCT VFR BLD CALC: 36.2 % — SIGNIFICANT CHANGE UP (ref 34.5–45)
HGB BLD-MCNC: 12.7 G/DL — SIGNIFICANT CHANGE UP (ref 11.5–15.5)
MAGNESIUM SERPL-MCNC: 2.2 MG/DL — SIGNIFICANT CHANGE UP (ref 1.6–2.6)
MCHC RBC-ENTMCNC: 30.3 PG — SIGNIFICANT CHANGE UP (ref 27–34)
MCHC RBC-ENTMCNC: 35.1 GM/DL — SIGNIFICANT CHANGE UP (ref 32–36)
MCV RBC AUTO: 86.3 FL — SIGNIFICANT CHANGE UP (ref 80–100)
PHOSPHATE SERPL-MCNC: 2.7 MG/DL — SIGNIFICANT CHANGE UP (ref 2.5–4.5)
PLATELET # BLD AUTO: 208 K/UL — SIGNIFICANT CHANGE UP (ref 150–400)
POTASSIUM SERPL-MCNC: 4.2 MMOL/L — SIGNIFICANT CHANGE UP (ref 3.5–5.3)
POTASSIUM SERPL-SCNC: 4.2 MMOL/L — SIGNIFICANT CHANGE UP (ref 3.5–5.3)
RBC # BLD: 4.2 M/UL — SIGNIFICANT CHANGE UP (ref 3.8–5.2)
RBC # FLD: 14.5 % — SIGNIFICANT CHANGE UP (ref 10.3–14.5)
SODIUM SERPL-SCNC: 133 MMOL/L — LOW (ref 135–145)
SPECIMEN SOURCE: SIGNIFICANT CHANGE UP
WBC # BLD: 5 K/UL — SIGNIFICANT CHANGE UP (ref 3.8–10.5)
WBC # FLD AUTO: 5 K/UL — SIGNIFICANT CHANGE UP (ref 3.8–10.5)

## 2017-06-22 PROCEDURE — 99239 HOSP IP/OBS DSCHRG MGMT >30: CPT

## 2017-06-22 RX ORDER — CEFUROXIME AXETIL 250 MG
1 TABLET ORAL
Qty: 14 | Refills: 0 | OUTPATIENT
Start: 2017-06-22 | End: 2017-06-29

## 2017-06-22 RX ORDER — LOSARTAN POTASSIUM 100 MG/1
1 TABLET, FILM COATED ORAL
Qty: 30 | Refills: 0 | OUTPATIENT
Start: 2017-06-22 | End: 2017-07-22

## 2017-06-22 RX ADMIN — Medication 2: at 12:02

## 2017-06-22 RX ADMIN — Medication 1: at 08:28

## 2017-06-22 RX ADMIN — LOSARTAN POTASSIUM 50 MILLIGRAM(S): 100 TABLET, FILM COATED ORAL at 05:20

## 2017-06-22 RX ADMIN — Medication 162 MILLIGRAM(S): at 12:02

## 2017-06-22 NOTE — PROGRESS NOTE ADULT - SUBJECTIVE AND OBJECTIVE BOX
Patient is a 76y old  Female who presents with a chief complaint of "passed out" (2017 09:56).  Details of history are that she had just had a large bowel movement.   She also had been taking hydrochlorthiazide.       INTERVAL HPI/OVERNIGHT EVENTS:    MEDICATIONS  (STANDING):  aspirin  chewable 162milliGRAM(s) Oral daily  insulin lispro (HumaLOG) corrective regimen sliding scale  SubCutaneous three times a day before meals  dextrose 5%. 1000milliLiter(s) IV Continuous <Continuous>  dextrose 50% Injectable 12.5Gram(s) IV Push once  dextrose 50% Injectable 25Gram(s) IV Push once  dextrose 50% Injectable 25Gram(s) IV Push once  atorvastatin 40milliGRAM(s) Oral at bedtime  losartan 50milliGRAM(s) Oral daily  cefTRIAXone   IVPB  IV Intermittent   cefTRIAXone   IVPB 1Gram(s) IV Intermittent every 24 hours    MEDICATIONS  (PRN):  dextrose Gel 1Dose(s) Oral once PRN Blood Glucose LESS THAN 70 milliGRAM(s)/deciLiter  glucagon  Injectable 1milliGRAM(s) IntraMuscular once PRN Glucose <70 milliGRAM(s)/deciLiter      Allergies    No Known Allergies    REVIEW OF SYSTEMS:  CONSTITUTIONAL: No fever  RESPIRATORY: No cough, wheezing, chills or hemoptysis; No shortness of breath  CARDIOVASCULAR: No chest pain, palpitations, dizziness, or leg swelling  GASTROINTESTINAL: No abdominal or epigastric pain. No nausea, vomiting, or hematemesis; No diarrhea or constipation. No melena or hematochezia.  GENITOURINARY: No dysuria, frequency, hematuria, or incontinence  NEUROLOGICAL: No headaches, memory loss, loss of strength, numbness, or tremors  ENDOCRINE: No heat or cold intolerance; No hair loss  MUSCULOSKELETAL: No joint pain or swelling; No muscle, back, or extremity pain      Vital Signs Last 24 Hrs  T(C): 37, Max: 37 (- @ 11:31)  T(F): 98.6, Max: 98.6 (- @ 11:31)  HR: 78 (70 - 84)  BP: 121/52 (121/52 - 128/56)  BP(mean): --  RR: 18 (17 - 18)  SpO2: 100% (97% - 100%)    PHYSICAL EXAM:  GENERAL: NAD, alert  EYES: EOMI, PERRLA, conjunctiva and sclera clear  SKIN: No rashes or lesions  CHEST/LUNG: Clear to percussion bilaterally; No rales, rhonchi, wheezing, or rubs  HEART: Regular rate and rhythm; No murmurs, rubs, or gallops  ABDOMEN: Soft, Nontender, Nondistended; Bowel sounds present  EXTREMITIES: 1 + edema, distal LE  NERVOUS SYSTEM:  Alert & Oriented X3, Good concentration; Motor Strength 5/5 B/L upper and lower extremities;  LABS:                        12.7   5.0   )-----------( 208      ( 2017 09:02 )             36.2     06-    133<L>  |  99  |  12  ----------------------------<  201<H>  4.2   |  20<L>  |  0.89    Ca    9.0      2017 08:19  Phos  2.7     -  Mg     2.2     -    TPro  7.7  /  Alb  3.5  /  TBili  0.7  /  DBili  x   /  AST  20  /  ALT  17  /  AlkPhos  80  06-21    PT/INR - ( 2017 03:21 )   PT: 10.5 sec;   INR: 0.96 ratio         PTT - ( 2017 03:21 )  PTT:32.1 sec  Urinalysis Basic - ( 2017 06:59 )    Color: Yellow / Appearance: Clear / S.005 / pH: x  Gluc: x / Ketone: Negative  / Bili: Negative / Urobili: Negative   Blood: x / Protein: Negative / Nitrite: Negative   Leuk Esterase: Trace / RBC: 0-2 /HPF / WBC 6-10 /HPF   Sq Epi: x / Non Sq Epi: Occasional / Bacteria: Trace /HPF      CAPILLARY BLOOD GLUCOSE  214 (2017 11:31)  200 (2017 08:05)  154 (2017 21:13)  146 (2017 15:56)      RADIOLOGY & ADDITIONAL TESTS:    XRay:    CT Scan:    Imaging Personally Reviewed:  [ ] YES  [ ] NO    Consultant(s) Notes Reviewed:  [ ] YES  [ ] NO    Care Discussed with Consultants/Other Providers [ ] YES  [ ] NO

## 2017-06-23 ENCOUNTER — APPOINTMENT (OUTPATIENT)
Dept: VASCULAR SURGERY | Facility: CLINIC | Age: 77
End: 2017-06-23
Payer: COMMERCIAL

## 2017-06-23 ENCOUNTER — APPOINTMENT (OUTPATIENT)
Dept: VASCULAR SURGERY | Facility: CLINIC | Age: 77
End: 2017-06-23

## 2017-06-23 VITALS
HEIGHT: 59 IN | SYSTOLIC BLOOD PRESSURE: 126 MMHG | TEMPERATURE: 98.6 F | DIASTOLIC BLOOD PRESSURE: 58 MMHG | BODY MASS INDEX: 28.43 KG/M2 | WEIGHT: 141 LBS | RESPIRATION RATE: 16 BRPM | HEART RATE: 66 BPM

## 2017-06-23 LAB — BACTERIA UR CULT: ABNORMAL

## 2017-06-23 PROCEDURE — 93880 EXTRACRANIAL BILAT STUDY: CPT

## 2017-06-26 DIAGNOSIS — M79.7 FIBROMYALGIA: ICD-10-CM

## 2017-06-26 DIAGNOSIS — E87.1 HYPO-OSMOLALITY AND HYPONATREMIA: ICD-10-CM

## 2017-06-26 DIAGNOSIS — R55 SYNCOPE AND COLLAPSE: ICD-10-CM

## 2017-06-26 DIAGNOSIS — I10 ESSENTIAL (PRIMARY) HYPERTENSION: ICD-10-CM

## 2017-06-26 DIAGNOSIS — I77.9 DISORDER OF ARTERIES AND ARTERIOLES, UNSPECIFIED: ICD-10-CM

## 2017-06-26 DIAGNOSIS — Z86.73 PERSONAL HISTORY OF TRANSIENT ISCHEMIC ATTACK (TIA), AND CEREBRAL INFARCTION WITHOUT RESIDUAL DEFICITS: ICD-10-CM

## 2017-06-26 DIAGNOSIS — R74.8 ABNORMAL LEVELS OF OTHER SERUM ENZYMES: ICD-10-CM

## 2017-06-26 DIAGNOSIS — N39.0 URINARY TRACT INFECTION, SITE NOT SPECIFIED: ICD-10-CM

## 2017-06-26 DIAGNOSIS — E11.9 TYPE 2 DIABETES MELLITUS WITHOUT COMPLICATIONS: ICD-10-CM

## 2017-06-29 ENCOUNTER — OTHER (OUTPATIENT)
Age: 77
End: 2017-06-29

## 2017-06-30 ENCOUNTER — APPOINTMENT (OUTPATIENT)
Dept: UROLOGY | Facility: CLINIC | Age: 77
End: 2017-06-30

## 2017-06-30 VITALS
SYSTOLIC BLOOD PRESSURE: 136 MMHG | BODY MASS INDEX: 28.83 KG/M2 | HEIGHT: 59 IN | TEMPERATURE: 97.7 F | HEART RATE: 99 BPM | DIASTOLIC BLOOD PRESSURE: 80 MMHG | WEIGHT: 143 LBS

## 2017-07-03 LAB — BACTERIA UR CULT: NORMAL

## 2017-07-06 ENCOUNTER — OUTPATIENT (OUTPATIENT)
Dept: OUTPATIENT SERVICES | Facility: HOSPITAL | Age: 77
LOS: 1 days | End: 2017-07-06
Payer: COMMERCIAL

## 2017-07-06 ENCOUNTER — APPOINTMENT (OUTPATIENT)
Dept: PODIATRY | Facility: CLINIC | Age: 77
End: 2017-07-06

## 2017-07-06 VITALS
HEART RATE: 72 BPM | BODY MASS INDEX: 29.23 KG/M2 | DIASTOLIC BLOOD PRESSURE: 84 MMHG | HEIGHT: 59 IN | RESPIRATION RATE: 18 BRPM | WEIGHT: 145 LBS | SYSTOLIC BLOOD PRESSURE: 155 MMHG | TEMPERATURE: 98.3 F | OXYGEN SATURATION: 99 %

## 2017-07-06 DIAGNOSIS — Z00.00 ENCOUNTER FOR GENERAL ADULT MEDICAL EXAMINATION WITHOUT ABNORMAL FINDINGS: ICD-10-CM

## 2017-07-06 DIAGNOSIS — Z90.49 ACQUIRED ABSENCE OF OTHER SPECIFIED PARTS OF DIGESTIVE TRACT: Chronic | ICD-10-CM

## 2017-07-06 DIAGNOSIS — Z98.890 OTHER SPECIFIED POSTPROCEDURAL STATES: Chronic | ICD-10-CM

## 2017-07-06 PROCEDURE — G0463: CPT

## 2017-07-06 PROCEDURE — 11720 DEBRIDE NAIL 1-5: CPT

## 2017-07-06 RX ORDER — ACETAMINOPHEN 325 MG/1
325 TABLET ORAL
Refills: 0 | Status: ACTIVE | COMMUNITY

## 2017-07-10 DIAGNOSIS — B35.1 TINEA UNGUIUM: ICD-10-CM

## 2017-07-10 DIAGNOSIS — I73.9 PERIPHERAL VASCULAR DISEASE, UNSPECIFIED: ICD-10-CM

## 2017-07-10 DIAGNOSIS — M79.671 PAIN IN RIGHT FOOT: ICD-10-CM

## 2017-07-17 ENCOUNTER — APPOINTMENT (OUTPATIENT)
Dept: INTERNAL MEDICINE | Facility: CLINIC | Age: 77
End: 2017-07-17

## 2017-07-23 PROCEDURE — 85027 COMPLETE CBC AUTOMATED: CPT

## 2017-07-23 PROCEDURE — 99285 EMERGENCY DEPT VISIT HI MDM: CPT | Mod: 25

## 2017-07-23 PROCEDURE — 85730 THROMBOPLASTIN TIME PARTIAL: CPT

## 2017-07-23 PROCEDURE — 84100 ASSAY OF PHOSPHORUS: CPT

## 2017-07-23 PROCEDURE — 83935 ASSAY OF URINE OSMOLALITY: CPT

## 2017-07-23 PROCEDURE — 85610 PROTHROMBIN TIME: CPT

## 2017-07-23 PROCEDURE — 82553 CREATINE MB FRACTION: CPT

## 2017-07-23 PROCEDURE — 83036 HEMOGLOBIN GLYCOSYLATED A1C: CPT

## 2017-07-23 PROCEDURE — 82550 ASSAY OF CK (CPK): CPT

## 2017-07-23 PROCEDURE — 84300 ASSAY OF URINE SODIUM: CPT

## 2017-07-23 PROCEDURE — 83735 ASSAY OF MAGNESIUM: CPT

## 2017-07-23 PROCEDURE — 84484 ASSAY OF TROPONIN QUANT: CPT

## 2017-07-23 PROCEDURE — 36415 COLL VENOUS BLD VENIPUNCTURE: CPT

## 2017-07-23 PROCEDURE — 93306 TTE W/DOPPLER COMPLETE: CPT

## 2017-07-23 PROCEDURE — 83690 ASSAY OF LIPASE: CPT

## 2017-07-23 PROCEDURE — 83930 ASSAY OF BLOOD OSMOLALITY: CPT

## 2017-07-23 PROCEDURE — 84443 ASSAY THYROID STIM HORMONE: CPT

## 2017-07-23 PROCEDURE — 71045 X-RAY EXAM CHEST 1 VIEW: CPT

## 2017-07-23 PROCEDURE — 81001 URINALYSIS AUTO W/SCOPE: CPT

## 2017-07-23 PROCEDURE — 80061 LIPID PANEL: CPT

## 2017-07-23 PROCEDURE — 80048 BASIC METABOLIC PNL TOTAL CA: CPT

## 2017-07-23 PROCEDURE — 97161 PT EVAL LOW COMPLEX 20 MIN: CPT

## 2017-07-23 PROCEDURE — 70450 CT HEAD/BRAIN W/O DYE: CPT

## 2017-07-23 PROCEDURE — 87086 URINE CULTURE/COLONY COUNT: CPT

## 2017-07-23 PROCEDURE — 93005 ELECTROCARDIOGRAM TRACING: CPT

## 2017-07-23 PROCEDURE — 80053 COMPREHEN METABOLIC PANEL: CPT

## 2017-09-18 ENCOUNTER — APPOINTMENT (OUTPATIENT)
Dept: INTERNAL MEDICINE | Facility: CLINIC | Age: 77
End: 2017-09-18
Payer: COMMERCIAL

## 2017-09-18 VITALS
BODY MASS INDEX: 29.43 KG/M2 | WEIGHT: 146 LBS | HEIGHT: 59 IN | DIASTOLIC BLOOD PRESSURE: 90 MMHG | TEMPERATURE: 98 F | HEART RATE: 65 BPM | OXYGEN SATURATION: 98 % | SYSTOLIC BLOOD PRESSURE: 140 MMHG | RESPIRATION RATE: 18 BRPM

## 2017-09-18 DIAGNOSIS — Z12.11 ENCOUNTER FOR SCREENING FOR MALIGNANT NEOPLASM OF COLON: ICD-10-CM

## 2017-09-18 DIAGNOSIS — G45.9 TRANSIENT CEREBRAL ISCHEMIC ATTACK, UNSPECIFIED: ICD-10-CM

## 2017-09-18 DIAGNOSIS — B35.1 TINEA UNGUIUM: ICD-10-CM

## 2017-09-18 DIAGNOSIS — Z00.00 ENCOUNTER FOR GENERAL ADULT MEDICAL EXAMINATION W/OUT ABNORMAL FINDINGS: ICD-10-CM

## 2017-09-18 DIAGNOSIS — H34.821: ICD-10-CM

## 2017-09-18 PROCEDURE — 99214 OFFICE O/P EST MOD 30 MIN: CPT

## 2017-09-18 PROCEDURE — 99397 PER PM REEVAL EST PAT 65+ YR: CPT | Mod: 25

## 2017-09-18 RX ORDER — ATORVASTATIN CALCIUM 40 MG/1
40 TABLET, FILM COATED ORAL
Qty: 30 | Refills: 0 | Status: COMPLETED | COMMUNITY
Start: 2017-03-11 | End: 2017-09-18

## 2017-09-18 RX ORDER — ATORVASTATIN CALCIUM 20 MG/1
20 TABLET, FILM COATED ORAL DAILY
Qty: 30 | Refills: 0 | Status: COMPLETED | COMMUNITY
Start: 2017-02-28 | End: 2017-09-18

## 2017-09-18 RX ORDER — LOSARTAN POTASSIUM AND HYDROCHLOROTHIAZIDE 12.5; 5 MG/1; MG/1
50-12.5 TABLET ORAL DAILY
Qty: 30 | Refills: 0 | Status: COMPLETED | COMMUNITY
Start: 2017-06-14 | End: 2017-09-18

## 2017-09-18 RX ORDER — CEFUROXIME AXETIL 250 MG/5ML
250 POWDER, FOR SUSPENSION ORAL
Refills: 0 | Status: COMPLETED | COMMUNITY
End: 2017-09-18

## 2017-09-18 RX ORDER — METFORMIN HYDROCHLORIDE 850 MG/1
850 TABLET, COATED ORAL
Qty: 60 | Refills: 0 | Status: COMPLETED | COMMUNITY
Start: 2017-01-26 | End: 2017-09-18

## 2017-09-18 RX ORDER — NIMODIPINE 30 MG/1
30 CAPSULE, LIQUID FILLED ORAL
Qty: 108 | Refills: 0 | Status: COMPLETED | COMMUNITY
Start: 2017-01-26 | End: 2017-09-18

## 2017-09-18 RX ORDER — CEFUROXIME AXETIL 250 MG/1
250 TABLET ORAL
Qty: 14 | Refills: 0 | Status: COMPLETED | COMMUNITY
Start: 2017-06-22 | End: 2017-09-18

## 2017-09-18 RX ORDER — LOSARTAN POTASSIUM AND HYDROCHLOROTHIAZIDE 25; 100 MG/1; MG/1
100-25 TABLET ORAL DAILY
Qty: 30 | Refills: 0 | Status: COMPLETED | COMMUNITY
Start: 2017-02-28 | End: 2017-09-18

## 2017-09-18 RX ORDER — ASPIRIN ENTERIC COATED TABLETS 81 MG 81 MG/1
81 TABLET, DELAYED RELEASE ORAL
Qty: 30 | Refills: 0 | Status: COMPLETED | COMMUNITY
Start: 2017-01-26 | End: 2017-09-18

## 2017-09-18 RX ORDER — PANTOPRAZOLE 40 MG/1
40 TABLET, DELAYED RELEASE ORAL
Qty: 30 | Refills: 0 | Status: COMPLETED | COMMUNITY
Start: 2017-05-24 | End: 2017-09-18

## 2017-09-26 LAB
ALBUMIN SERPL ELPH-MCNC: 4.5 G/DL
ALP BLD-CCNC: 74 U/L
ALT SERPL-CCNC: 17 U/L
ANION GAP SERPL CALC-SCNC: 20 MMOL/L
APPEARANCE: CLEAR
AST SERPL-CCNC: 20 U/L
BASOPHILS # BLD AUTO: 0.01 K/UL
BASOPHILS NFR BLD AUTO: 0.1 %
BILIRUB SERPL-MCNC: 0.5 MG/DL
BILIRUBIN URINE: NEGATIVE
BLOOD URINE: NEGATIVE
BUN SERPL-MCNC: 16 MG/DL
CALCIUM SERPL-MCNC: 9.7 MG/DL
CHLORIDE SERPL-SCNC: 101 MMOL/L
CHOLEST SERPL-MCNC: 149 MG/DL
CHOLEST/HDLC SERPL: 1.9 RATIO
CO2 SERPL-SCNC: 22 MMOL/L
COLOR: YELLOW
CREAT SERPL-MCNC: 0.82 MG/DL
CREAT SPEC-SCNC: 32 MG/DL
EOSINOPHIL # BLD AUTO: 0.56 K/UL
EOSINOPHIL NFR BLD AUTO: 6.7 %
ESTIMATED AVERAGE GLUCOSE: 148 MG/DL
GLUCOSE QUALITATIVE U: NORMAL MG/DL
GLUCOSE SERPL-MCNC: 124 MG/DL
HBA1C MFR BLD HPLC: 6.8 %
HCT VFR BLD CALC: 33 %
HDLC SERPL-MCNC: 78 MG/DL
HGB BLD-MCNC: 11.2 G/DL
IMM GRANULOCYTES NFR BLD AUTO: 0.1 %
KETONES URINE: NEGATIVE
LDLC SERPL CALC-MCNC: 51 MG/DL
LEUKOCYTE ESTERASE URINE: NEGATIVE
LYMPHOCYTES # BLD AUTO: 2.27 K/UL
LYMPHOCYTES NFR BLD AUTO: 27 %
MAN DIFF?: NORMAL
MCHC RBC-ENTMCNC: 29.6 PG
MCHC RBC-ENTMCNC: 33.9 GM/DL
MCV RBC AUTO: 87.1 FL
MICROALBUMIN 24H UR DL<=1MG/L-MCNC: 2.5 MG/DL
MICROALBUMIN/CREAT 24H UR-RTO: 78 MG/G
MONOCYTES # BLD AUTO: 0.65 K/UL
MONOCYTES NFR BLD AUTO: 7.7 %
NEUTROPHILS # BLD AUTO: 4.9 K/UL
NEUTROPHILS NFR BLD AUTO: 58.4 %
NITRITE URINE: NEGATIVE
PH URINE: 6.5
PLATELET # BLD AUTO: 261 K/UL
POTASSIUM SERPL-SCNC: 4.6 MMOL/L
PROT SERPL-MCNC: 7.4 G/DL
PROTEIN URINE: NEGATIVE MG/DL
RBC # BLD: 3.79 M/UL
RBC # FLD: 16 %
SODIUM SERPL-SCNC: 143 MMOL/L
SPECIFIC GRAVITY URINE: 1.01
TRIGL SERPL-MCNC: 98 MG/DL
UROBILINOGEN URINE: NORMAL MG/DL
WBC # FLD AUTO: 8.4 K/UL

## 2017-09-26 RX ORDER — METFORMIN ER 750 MG 750 MG/1
750 TABLET ORAL
Qty: 60 | Refills: 5 | Status: COMPLETED | COMMUNITY
End: 2017-09-26

## 2017-10-12 ENCOUNTER — APPOINTMENT (OUTPATIENT)
Dept: RHEUMATOLOGY | Facility: CLINIC | Age: 77
End: 2017-10-12
Payer: COMMERCIAL

## 2017-10-12 VITALS
WEIGHT: 144 LBS | RESPIRATION RATE: 18 BRPM | TEMPERATURE: 98 F | HEIGHT: 57 IN | BODY MASS INDEX: 31.07 KG/M2 | DIASTOLIC BLOOD PRESSURE: 76 MMHG | HEART RATE: 71 BPM | SYSTOLIC BLOOD PRESSURE: 148 MMHG | OXYGEN SATURATION: 90 %

## 2017-10-12 DIAGNOSIS — Z83.3 FAMILY HISTORY OF DIABETES MELLITUS: ICD-10-CM

## 2017-10-12 DIAGNOSIS — M79.7 FIBROMYALGIA: ICD-10-CM

## 2017-10-12 LAB — ERYTHROCYTE [SEDIMENTATION RATE] IN BLOOD BY WESTERGREN METHOD: 2 MM/HR

## 2017-10-12 PROCEDURE — 99205 OFFICE O/P NEW HI 60 MIN: CPT

## 2017-10-13 LAB
25(OH)D3 SERPL-MCNC: 24.9 NG/ML
CCP AB SER IA-ACNC: <8 UNITS
CK SERPL-CCNC: 95 U/L
CRP SERPL-MCNC: 0.4 MG/DL
RF+CCP IGG SER-IMP: NEGATIVE
RHEUMATOID FACT SER QL: <7 IU/ML

## 2017-10-19 PROBLEM — Z83.3 FAMILY HISTORY OF DIABETES MELLITUS: Status: ACTIVE | Noted: 2017-10-12

## 2017-10-26 ENCOUNTER — OTHER (OUTPATIENT)
Age: 77
End: 2017-10-26

## 2017-11-02 NOTE — DIETITIAN INITIAL EVALUATION ADULT. - SIGNS/SYMPTOMS
Med rec completed per pt at bedside  Allergies reviewed - NKDA  No ABX in last 30 days   Pt has his Combivent and Flonase as bedside   NvbN8S=4.3, Iio=378 to 112, finger stick ixyqy=268 to 253

## 2017-11-08 ENCOUNTER — OTHER (OUTPATIENT)
Age: 77
End: 2017-11-08

## 2017-11-09 ENCOUNTER — APPOINTMENT (OUTPATIENT)
Dept: VASCULAR SURGERY | Facility: CLINIC | Age: 77
End: 2017-11-09
Payer: COMMERCIAL

## 2017-11-09 VITALS — HEIGHT: 57 IN | WEIGHT: 144 LBS | BODY MASS INDEX: 31.07 KG/M2 | TEMPERATURE: 98.7 F

## 2017-11-09 VITALS — SYSTOLIC BLOOD PRESSURE: 170 MMHG | HEART RATE: 72 BPM | DIASTOLIC BLOOD PRESSURE: 102 MMHG

## 2017-11-09 PROCEDURE — 99213 OFFICE O/P EST LOW 20 MIN: CPT

## 2017-11-09 PROCEDURE — 93880 EXTRACRANIAL BILAT STUDY: CPT

## 2017-11-16 ENCOUNTER — APPOINTMENT (OUTPATIENT)
Dept: RHEUMATOLOGY | Facility: CLINIC | Age: 77
End: 2017-11-16
Payer: COMMERCIAL

## 2017-11-16 VITALS
HEIGHT: 57 IN | DIASTOLIC BLOOD PRESSURE: 74 MMHG | HEART RATE: 92 BPM | SYSTOLIC BLOOD PRESSURE: 154 MMHG | TEMPERATURE: 98.6 F | OXYGEN SATURATION: 92 % | RESPIRATION RATE: 18 BRPM | WEIGHT: 148 LBS | BODY MASS INDEX: 31.93 KG/M2

## 2017-11-16 DIAGNOSIS — M25.50 PAIN IN UNSPECIFIED JOINT: ICD-10-CM

## 2017-11-16 PROCEDURE — 99213 OFFICE O/P EST LOW 20 MIN: CPT

## 2017-11-17 ENCOUNTER — APPOINTMENT (OUTPATIENT)
Dept: VASCULAR SURGERY | Facility: CLINIC | Age: 77
End: 2017-11-17

## 2017-11-20 PROBLEM — M25.50 ARTHRALGIA: Status: ACTIVE | Noted: 2017-10-12

## 2017-11-29 ENCOUNTER — MOBILE ON CALL (OUTPATIENT)
Age: 77
End: 2017-11-29

## 2017-12-12 ENCOUNTER — INPATIENT (INPATIENT)
Facility: HOSPITAL | Age: 77
LOS: 0 days | Discharge: HOME | DRG: 312 | End: 2017-12-13
Attending: INTERNAL MEDICINE | Admitting: INTERNAL MEDICINE
Payer: COMMERCIAL

## 2017-12-12 VITALS
RESPIRATION RATE: 18 BRPM | DIASTOLIC BLOOD PRESSURE: 86 MMHG | SYSTOLIC BLOOD PRESSURE: 151 MMHG | WEIGHT: 149.91 LBS | OXYGEN SATURATION: 98 % | HEIGHT: 62 IN | HEART RATE: 96 BPM | TEMPERATURE: 98 F

## 2017-12-12 DIAGNOSIS — Z90.49 ACQUIRED ABSENCE OF OTHER SPECIFIED PARTS OF DIGESTIVE TRACT: Chronic | ICD-10-CM

## 2017-12-12 DIAGNOSIS — Z98.890 OTHER SPECIFIED POSTPROCEDURAL STATES: Chronic | ICD-10-CM

## 2017-12-12 PROCEDURE — 71010: CPT | Mod: 26

## 2017-12-12 RX ORDER — SODIUM CHLORIDE 9 MG/ML
3 INJECTION INTRAMUSCULAR; INTRAVENOUS; SUBCUTANEOUS EVERY 8 HOURS
Qty: 0 | Refills: 0 | Status: DISCONTINUED | OUTPATIENT
Start: 2017-12-12 | End: 2017-12-13

## 2017-12-13 ENCOUNTER — TRANSCRIPTION ENCOUNTER (OUTPATIENT)
Age: 77
End: 2017-12-13

## 2017-12-13 VITALS
HEART RATE: 77 BPM | DIASTOLIC BLOOD PRESSURE: 70 MMHG | SYSTOLIC BLOOD PRESSURE: 146 MMHG | OXYGEN SATURATION: 100 % | TEMPERATURE: 97 F | RESPIRATION RATE: 18 BRPM

## 2017-12-13 DIAGNOSIS — I77.9 DISORDER OF ARTERIES AND ARTERIOLES, UNSPECIFIED: ICD-10-CM

## 2017-12-13 DIAGNOSIS — E11.9 TYPE 2 DIABETES MELLITUS WITHOUT COMPLICATIONS: ICD-10-CM

## 2017-12-13 DIAGNOSIS — R55 SYNCOPE AND COLLAPSE: ICD-10-CM

## 2017-12-13 DIAGNOSIS — Z29.9 ENCOUNTER FOR PROPHYLACTIC MEASURES, UNSPECIFIED: ICD-10-CM

## 2017-12-13 DIAGNOSIS — I10 ESSENTIAL (PRIMARY) HYPERTENSION: ICD-10-CM

## 2017-12-13 DIAGNOSIS — D72.829 ELEVATED WHITE BLOOD CELL COUNT, UNSPECIFIED: ICD-10-CM

## 2017-12-13 DIAGNOSIS — R74.8 ABNORMAL LEVELS OF OTHER SERUM ENZYMES: ICD-10-CM

## 2017-12-13 LAB
ALBUMIN SERPL ELPH-MCNC: 3.9 G/DL — SIGNIFICANT CHANGE UP (ref 3.5–5)
ALBUMIN SERPL ELPH-MCNC: 3.9 G/DL — SIGNIFICANT CHANGE UP (ref 3.5–5)
ALBUMIN SERPL ELPH-MCNC: 4.1 G/DL — SIGNIFICANT CHANGE UP (ref 3.5–5)
ALP SERPL-CCNC: 58 U/L — SIGNIFICANT CHANGE UP (ref 40–120)
ALP SERPL-CCNC: 63 U/L — SIGNIFICANT CHANGE UP (ref 40–120)
ALP SERPL-CCNC: 78 U/L — SIGNIFICANT CHANGE UP (ref 40–120)
ALT FLD-CCNC: 15 U/L DA — SIGNIFICANT CHANGE UP (ref 10–60)
ALT FLD-CCNC: 16 U/L DA — SIGNIFICANT CHANGE UP (ref 10–60)
ALT FLD-CCNC: 17 U/L DA — SIGNIFICANT CHANGE UP (ref 10–60)
ANION GAP SERPL CALC-SCNC: 6 MMOL/L — SIGNIFICANT CHANGE UP (ref 5–17)
ANION GAP SERPL CALC-SCNC: 7 MMOL/L — SIGNIFICANT CHANGE UP (ref 5–17)
ANION GAP SERPL CALC-SCNC: 8 MMOL/L — SIGNIFICANT CHANGE UP (ref 5–17)
APPEARANCE UR: CLEAR — SIGNIFICANT CHANGE UP
APTT BLD: 32.4 SEC — SIGNIFICANT CHANGE UP (ref 27.5–37.4)
AST SERPL-CCNC: 17 U/L — SIGNIFICANT CHANGE UP (ref 10–40)
AST SERPL-CCNC: 18 U/L — SIGNIFICANT CHANGE UP (ref 10–40)
AST SERPL-CCNC: 20 U/L — SIGNIFICANT CHANGE UP (ref 10–40)
BACTERIA # UR AUTO: ABNORMAL /HPF
BASOPHILS # BLD AUTO: 0 K/UL — SIGNIFICANT CHANGE UP (ref 0–0.2)
BASOPHILS # BLD AUTO: 0.1 K/UL — SIGNIFICANT CHANGE UP (ref 0–0.2)
BASOPHILS # BLD AUTO: 0.1 K/UL — SIGNIFICANT CHANGE UP (ref 0–0.2)
BASOPHILS NFR BLD AUTO: 0.4 % — SIGNIFICANT CHANGE UP (ref 0–2)
BASOPHILS NFR BLD AUTO: 0.4 % — SIGNIFICANT CHANGE UP (ref 0–2)
BASOPHILS NFR BLD AUTO: 1.1 % — SIGNIFICANT CHANGE UP (ref 0–2)
BILIRUB SERPL-MCNC: 0.5 MG/DL — SIGNIFICANT CHANGE UP (ref 0.2–1.2)
BILIRUB SERPL-MCNC: 0.7 MG/DL — SIGNIFICANT CHANGE UP (ref 0.2–1.2)
BILIRUB SERPL-MCNC: 0.8 MG/DL — SIGNIFICANT CHANGE UP (ref 0.2–1.2)
BILIRUB UR-MCNC: NEGATIVE — SIGNIFICANT CHANGE UP
BUN SERPL-MCNC: 14 MG/DL — SIGNIFICANT CHANGE UP (ref 7–18)
BUN SERPL-MCNC: 17 MG/DL — SIGNIFICANT CHANGE UP (ref 7–18)
BUN SERPL-MCNC: 20 MG/DL — HIGH (ref 7–18)
CALCIUM SERPL-MCNC: 9.1 MG/DL — SIGNIFICANT CHANGE UP (ref 8.4–10.5)
CALCIUM SERPL-MCNC: 9.2 MG/DL — SIGNIFICANT CHANGE UP (ref 8.4–10.5)
CALCIUM SERPL-MCNC: 9.5 MG/DL — SIGNIFICANT CHANGE UP (ref 8.4–10.5)
CHLORIDE SERPL-SCNC: 102 MMOL/L — SIGNIFICANT CHANGE UP (ref 96–108)
CHLORIDE SERPL-SCNC: 104 MMOL/L — SIGNIFICANT CHANGE UP (ref 96–108)
CHLORIDE SERPL-SCNC: 104 MMOL/L — SIGNIFICANT CHANGE UP (ref 96–108)
CHOLEST SERPL-MCNC: 161 MG/DL — SIGNIFICANT CHANGE UP (ref 10–199)
CK MB BLD-MCNC: 1.7 % — SIGNIFICANT CHANGE UP (ref 0–3.5)
CK MB BLD-MCNC: 2.1 % — SIGNIFICANT CHANGE UP (ref 0–3.5)
CK MB BLD-MCNC: 2.3 % — SIGNIFICANT CHANGE UP (ref 0–3.5)
CK MB CFR SERPL CALC: 1.8 NG/ML — SIGNIFICANT CHANGE UP (ref 0–3.6)
CK MB CFR SERPL CALC: 2.3 NG/ML — SIGNIFICANT CHANGE UP (ref 0–3.6)
CK MB CFR SERPL CALC: 2.5 NG/ML — SIGNIFICANT CHANGE UP (ref 0–3.6)
CK SERPL-CCNC: 106 U/L — SIGNIFICANT CHANGE UP (ref 21–215)
CK SERPL-CCNC: 108 U/L — SIGNIFICANT CHANGE UP (ref 21–215)
CK SERPL-CCNC: 111 U/L — SIGNIFICANT CHANGE UP (ref 21–215)
CO2 SERPL-SCNC: 27 MMOL/L — SIGNIFICANT CHANGE UP (ref 22–31)
CO2 SERPL-SCNC: 28 MMOL/L — SIGNIFICANT CHANGE UP (ref 22–31)
CO2 SERPL-SCNC: 29 MMOL/L — SIGNIFICANT CHANGE UP (ref 22–31)
COLOR SPEC: SIGNIFICANT CHANGE UP
COMMENT - URINE: SIGNIFICANT CHANGE UP
CREAT SERPL-MCNC: 0.77 MG/DL — SIGNIFICANT CHANGE UP (ref 0.5–1.3)
CREAT SERPL-MCNC: 0.91 MG/DL — SIGNIFICANT CHANGE UP (ref 0.5–1.3)
CREAT SERPL-MCNC: 1.01 MG/DL — SIGNIFICANT CHANGE UP (ref 0.5–1.3)
D DIMER BLD IA.RAPID-MCNC: 183 NG/ML DDU — SIGNIFICANT CHANGE UP
DIFF PNL FLD: NEGATIVE — SIGNIFICANT CHANGE UP
EOSINOPHIL # BLD AUTO: 0.3 K/UL — SIGNIFICANT CHANGE UP (ref 0–0.5)
EOSINOPHIL # BLD AUTO: 0.3 K/UL — SIGNIFICANT CHANGE UP (ref 0–0.5)
EOSINOPHIL # BLD AUTO: 0.4 K/UL — SIGNIFICANT CHANGE UP (ref 0–0.5)
EOSINOPHIL NFR BLD AUTO: 2.7 % — SIGNIFICANT CHANGE UP (ref 0–6)
EOSINOPHIL NFR BLD AUTO: 4.6 % — SIGNIFICANT CHANGE UP (ref 0–6)
EOSINOPHIL NFR BLD AUTO: 4.8 % — SIGNIFICANT CHANGE UP (ref 0–6)
EPI CELLS # UR: SIGNIFICANT CHANGE UP /HPF
FOLATE SERPL-MCNC: 18.2 NG/ML — SIGNIFICANT CHANGE UP (ref 4.8–24.2)
GLUCOSE SERPL-MCNC: 106 MG/DL — HIGH (ref 70–99)
GLUCOSE SERPL-MCNC: 135 MG/DL — HIGH (ref 70–99)
GLUCOSE SERPL-MCNC: 168 MG/DL — HIGH (ref 70–99)
GLUCOSE UR QL: NEGATIVE — SIGNIFICANT CHANGE UP
HBA1C BLD-MCNC: 6.3 % — HIGH (ref 4–5.6)
HCT VFR BLD CALC: 36.1 % — SIGNIFICANT CHANGE UP (ref 34.5–45)
HCT VFR BLD CALC: 37.1 % — SIGNIFICANT CHANGE UP (ref 34.5–45)
HCT VFR BLD CALC: 37.4 % — SIGNIFICANT CHANGE UP (ref 34.5–45)
HDLC SERPL-MCNC: 97 MG/DL — SIGNIFICANT CHANGE UP (ref 40–125)
HGB BLD-MCNC: 12.2 G/DL — SIGNIFICANT CHANGE UP (ref 11.5–15.5)
HGB BLD-MCNC: 12.4 G/DL — SIGNIFICANT CHANGE UP (ref 11.5–15.5)
HGB BLD-MCNC: 12.6 G/DL — SIGNIFICANT CHANGE UP (ref 11.5–15.5)
INR BLD: 0.96 RATIO — SIGNIFICANT CHANGE UP (ref 0.88–1.16)
INR BLD: 1.04 RATIO — SIGNIFICANT CHANGE UP (ref 0.88–1.16)
KETONES UR-MCNC: NEGATIVE — SIGNIFICANT CHANGE UP
LEUKOCYTE ESTERASE UR-ACNC: ABNORMAL
LIPID PNL WITH DIRECT LDL SERPL: 49 MG/DL — SIGNIFICANT CHANGE UP
LYMPHOCYTES # BLD AUTO: 1.1 K/UL — SIGNIFICANT CHANGE UP (ref 1–3.3)
LYMPHOCYTES # BLD AUTO: 1.2 K/UL — SIGNIFICANT CHANGE UP (ref 1–3.3)
LYMPHOCYTES # BLD AUTO: 1.6 K/UL — SIGNIFICANT CHANGE UP (ref 1–3.3)
LYMPHOCYTES # BLD AUTO: 18.7 % — SIGNIFICANT CHANGE UP (ref 13–44)
LYMPHOCYTES # BLD AUTO: 23.8 % — SIGNIFICANT CHANGE UP (ref 13–44)
LYMPHOCYTES # BLD AUTO: 7.3 % — LOW (ref 13–44)
MAGNESIUM SERPL-MCNC: 2.1 MG/DL — SIGNIFICANT CHANGE UP (ref 1.6–2.6)
MCHC RBC-ENTMCNC: 30.3 PG — SIGNIFICANT CHANGE UP (ref 27–34)
MCHC RBC-ENTMCNC: 31 PG — SIGNIFICANT CHANGE UP (ref 27–34)
MCHC RBC-ENTMCNC: 31.6 PG — SIGNIFICANT CHANGE UP (ref 27–34)
MCHC RBC-ENTMCNC: 33.5 GM/DL — SIGNIFICANT CHANGE UP (ref 32–36)
MCHC RBC-ENTMCNC: 33.8 GM/DL — SIGNIFICANT CHANGE UP (ref 32–36)
MCHC RBC-ENTMCNC: 33.8 GM/DL — SIGNIFICANT CHANGE UP (ref 32–36)
MCV RBC AUTO: 90.7 FL — SIGNIFICANT CHANGE UP (ref 80–100)
MCV RBC AUTO: 91.6 FL — SIGNIFICANT CHANGE UP (ref 80–100)
MCV RBC AUTO: 93.4 FL — SIGNIFICANT CHANGE UP (ref 80–100)
MONOCYTES # BLD AUTO: 0.6 K/UL — SIGNIFICANT CHANGE UP (ref 0–0.9)
MONOCYTES # BLD AUTO: 0.7 K/UL — SIGNIFICANT CHANGE UP (ref 0–0.9)
MONOCYTES # BLD AUTO: 1 K/UL — HIGH (ref 0–0.9)
MONOCYTES NFR BLD AUTO: 10.1 % — SIGNIFICANT CHANGE UP (ref 2–14)
MONOCYTES NFR BLD AUTO: 6.5 % — SIGNIFICANT CHANGE UP (ref 2–14)
MONOCYTES NFR BLD AUTO: 8.8 % — SIGNIFICANT CHANGE UP (ref 2–14)
NEUTROPHILS # BLD AUTO: 13.1 K/UL — HIGH (ref 1.8–7.4)
NEUTROPHILS # BLD AUTO: 4 K/UL — SIGNIFICANT CHANGE UP (ref 1.8–7.4)
NEUTROPHILS # BLD AUTO: 4.3 K/UL — SIGNIFICANT CHANGE UP (ref 1.8–7.4)
NEUTROPHILS NFR BLD AUTO: 60.4 % — SIGNIFICANT CHANGE UP (ref 43–77)
NEUTROPHILS NFR BLD AUTO: 67.3 % — SIGNIFICANT CHANGE UP (ref 43–77)
NEUTROPHILS NFR BLD AUTO: 83.1 % — HIGH (ref 43–77)
NITRITE UR-MCNC: NEGATIVE — SIGNIFICANT CHANGE UP
PH UR: 7 — SIGNIFICANT CHANGE UP (ref 5–8)
PHOSPHATE SERPL-MCNC: 3.5 MG/DL — SIGNIFICANT CHANGE UP (ref 2.5–4.5)
PLATELET # BLD AUTO: 258 K/UL — SIGNIFICANT CHANGE UP (ref 150–400)
PLATELET # BLD AUTO: 261 K/UL — SIGNIFICANT CHANGE UP (ref 150–400)
PLATELET # BLD AUTO: 267 K/UL — SIGNIFICANT CHANGE UP (ref 150–400)
POTASSIUM SERPL-MCNC: 4.6 MMOL/L — SIGNIFICANT CHANGE UP (ref 3.5–5.3)
POTASSIUM SERPL-MCNC: 4.7 MMOL/L — SIGNIFICANT CHANGE UP (ref 3.5–5.3)
POTASSIUM SERPL-MCNC: 4.8 MMOL/L — SIGNIFICANT CHANGE UP (ref 3.5–5.3)
POTASSIUM SERPL-SCNC: 4.6 MMOL/L — SIGNIFICANT CHANGE UP (ref 3.5–5.3)
POTASSIUM SERPL-SCNC: 4.7 MMOL/L — SIGNIFICANT CHANGE UP (ref 3.5–5.3)
POTASSIUM SERPL-SCNC: 4.8 MMOL/L — SIGNIFICANT CHANGE UP (ref 3.5–5.3)
PROT SERPL-MCNC: 7.9 G/DL — SIGNIFICANT CHANGE UP (ref 6–8.3)
PROT SERPL-MCNC: 8 G/DL — SIGNIFICANT CHANGE UP (ref 6–8.3)
PROT SERPL-MCNC: 8.3 G/DL — SIGNIFICANT CHANGE UP (ref 6–8.3)
PROT UR-MCNC: 15
PROTHROM AB SERPL-ACNC: 10.5 SEC — SIGNIFICANT CHANGE UP (ref 9.8–12.7)
PROTHROM AB SERPL-ACNC: 11.3 SEC — SIGNIFICANT CHANGE UP (ref 9.8–12.7)
RBC # BLD: 3.94 M/UL — SIGNIFICANT CHANGE UP (ref 3.8–5.2)
RBC # BLD: 4 M/UL — SIGNIFICANT CHANGE UP (ref 3.8–5.2)
RBC # BLD: 4.09 M/UL — SIGNIFICANT CHANGE UP (ref 3.8–5.2)
RBC # FLD: 13.6 % — SIGNIFICANT CHANGE UP (ref 10.3–14.5)
RBC # FLD: 13.6 % — SIGNIFICANT CHANGE UP (ref 10.3–14.5)
RBC # FLD: 13.8 % — SIGNIFICANT CHANGE UP (ref 10.3–14.5)
RBC CASTS # UR COMP ASSIST: SIGNIFICANT CHANGE UP /HPF (ref 0–2)
SODIUM SERPL-SCNC: 136 MMOL/L — SIGNIFICANT CHANGE UP (ref 135–145)
SODIUM SERPL-SCNC: 139 MMOL/L — SIGNIFICANT CHANGE UP (ref 135–145)
SODIUM SERPL-SCNC: 140 MMOL/L — SIGNIFICANT CHANGE UP (ref 135–145)
SP GR SPEC: 1 — LOW (ref 1.01–1.02)
TOTAL CHOLESTEROL/HDL RATIO MEASUREMENT: 1.7 RATIO — LOW (ref 3.3–7.1)
TRIGL SERPL-MCNC: 73 MG/DL — SIGNIFICANT CHANGE UP (ref 10–149)
TROPONIN I SERPL-MCNC: 0.08 NG/ML — HIGH (ref 0–0.04)
TROPONIN I SERPL-MCNC: 0.08 NG/ML — HIGH (ref 0–0.04)
TROPONIN I SERPL-MCNC: 0.09 NG/ML — HIGH (ref 0–0.04)
TSH SERPL-MCNC: 0.39 UU/ML — SIGNIFICANT CHANGE UP (ref 0.34–4.82)
UROBILINOGEN FLD QL: NEGATIVE — SIGNIFICANT CHANGE UP
VIT B12 SERPL-MCNC: 239 PG/ML — LOW (ref 243–894)
WBC # BLD: 15.8 K/UL — HIGH (ref 3.8–10.5)
WBC # BLD: 6.4 K/UL — SIGNIFICANT CHANGE UP (ref 3.8–10.5)
WBC # BLD: 6.7 K/UL — SIGNIFICANT CHANGE UP (ref 3.8–10.5)
WBC # FLD AUTO: 15.8 K/UL — HIGH (ref 3.8–10.5)
WBC # FLD AUTO: 6.4 K/UL — SIGNIFICANT CHANGE UP (ref 3.8–10.5)
WBC # FLD AUTO: 6.7 K/UL — SIGNIFICANT CHANGE UP (ref 3.8–10.5)
WBC UR QL: SIGNIFICANT CHANGE UP /HPF (ref 0–5)

## 2017-12-13 PROCEDURE — 83036 HEMOGLOBIN GLYCOSYLATED A1C: CPT

## 2017-12-13 PROCEDURE — 70450 CT HEAD/BRAIN W/O DYE: CPT | Mod: 26

## 2017-12-13 PROCEDURE — 82607 VITAMIN B-12: CPT

## 2017-12-13 PROCEDURE — 82553 CREATINE MB FRACTION: CPT

## 2017-12-13 PROCEDURE — 85610 PROTHROMBIN TIME: CPT

## 2017-12-13 PROCEDURE — 84100 ASSAY OF PHOSPHORUS: CPT

## 2017-12-13 PROCEDURE — 84484 ASSAY OF TROPONIN QUANT: CPT

## 2017-12-13 PROCEDURE — G0378: CPT

## 2017-12-13 PROCEDURE — 81001 URINALYSIS AUTO W/SCOPE: CPT

## 2017-12-13 PROCEDURE — 83735 ASSAY OF MAGNESIUM: CPT

## 2017-12-13 PROCEDURE — 80053 COMPREHEN METABOLIC PANEL: CPT

## 2017-12-13 PROCEDURE — 99223 1ST HOSP IP/OBS HIGH 75: CPT | Mod: GC

## 2017-12-13 PROCEDURE — 99285 EMERGENCY DEPT VISIT HI MDM: CPT | Mod: 25

## 2017-12-13 PROCEDURE — 80061 LIPID PANEL: CPT

## 2017-12-13 PROCEDURE — 85027 COMPLETE CBC AUTOMATED: CPT

## 2017-12-13 PROCEDURE — 71045 X-RAY EXAM CHEST 1 VIEW: CPT

## 2017-12-13 PROCEDURE — 82962 GLUCOSE BLOOD TEST: CPT

## 2017-12-13 PROCEDURE — 85379 FIBRIN DEGRADATION QUANT: CPT

## 2017-12-13 PROCEDURE — 85730 THROMBOPLASTIN TIME PARTIAL: CPT

## 2017-12-13 PROCEDURE — 70450 CT HEAD/BRAIN W/O DYE: CPT

## 2017-12-13 PROCEDURE — 82306 VITAMIN D 25 HYDROXY: CPT

## 2017-12-13 PROCEDURE — 84443 ASSAY THYROID STIM HORMONE: CPT

## 2017-12-13 PROCEDURE — 93005 ELECTROCARDIOGRAM TRACING: CPT

## 2017-12-13 PROCEDURE — 82550 ASSAY OF CK (CPK): CPT

## 2017-12-13 PROCEDURE — 82746 ASSAY OF FOLIC ACID SERUM: CPT

## 2017-12-13 RX ORDER — ATORVASTATIN CALCIUM 80 MG/1
40 TABLET, FILM COATED ORAL AT BEDTIME
Qty: 0 | Refills: 0 | Status: DISCONTINUED | OUTPATIENT
Start: 2017-12-13 | End: 2017-12-13

## 2017-12-13 RX ORDER — SODIUM CHLORIDE 9 MG/ML
1000 INJECTION INTRAMUSCULAR; INTRAVENOUS; SUBCUTANEOUS
Qty: 0 | Refills: 0 | Status: DISCONTINUED | OUTPATIENT
Start: 2017-12-13 | End: 2017-12-13

## 2017-12-13 RX ORDER — ENOXAPARIN SODIUM 100 MG/ML
40 INJECTION SUBCUTANEOUS DAILY
Qty: 0 | Refills: 0 | Status: DISCONTINUED | OUTPATIENT
Start: 2017-12-13 | End: 2017-12-13

## 2017-12-13 RX ORDER — ASPIRIN/CALCIUM CARB/MAGNESIUM 324 MG
162 TABLET ORAL DAILY
Qty: 0 | Refills: 0 | Status: DISCONTINUED | OUTPATIENT
Start: 2017-12-13 | End: 2017-12-13

## 2017-12-13 RX ORDER — ASPIRIN/CALCIUM CARB/MAGNESIUM 324 MG
325 TABLET ORAL ONCE
Qty: 0 | Refills: 0 | Status: COMPLETED | OUTPATIENT
Start: 2017-12-13 | End: 2017-12-13

## 2017-12-13 RX ORDER — ACETAMINOPHEN 500 MG
650 TABLET ORAL EVERY 6 HOURS
Qty: 0 | Refills: 0 | Status: DISCONTINUED | OUTPATIENT
Start: 2017-12-13 | End: 2017-12-13

## 2017-12-13 RX ORDER — LOSARTAN POTASSIUM 100 MG/1
50 TABLET, FILM COATED ORAL DAILY
Qty: 0 | Refills: 0 | Status: DISCONTINUED | OUTPATIENT
Start: 2017-12-13 | End: 2017-12-13

## 2017-12-13 RX ORDER — INSULIN LISPRO 100/ML
VIAL (ML) SUBCUTANEOUS
Qty: 0 | Refills: 0 | Status: DISCONTINUED | OUTPATIENT
Start: 2017-12-13 | End: 2017-12-13

## 2017-12-13 RX ADMIN — SODIUM CHLORIDE 3 MILLILITER(S): 9 INJECTION INTRAMUSCULAR; INTRAVENOUS; SUBCUTANEOUS at 08:09

## 2017-12-13 RX ADMIN — SODIUM CHLORIDE 3 MILLILITER(S): 9 INJECTION INTRAMUSCULAR; INTRAVENOUS; SUBCUTANEOUS at 13:16

## 2017-12-13 RX ADMIN — Medication 325 MILLIGRAM(S): at 02:58

## 2017-12-13 RX ADMIN — LOSARTAN POTASSIUM 50 MILLIGRAM(S): 100 TABLET, FILM COATED ORAL at 11:54

## 2017-12-13 RX ADMIN — Medication 162 MILLIGRAM(S): at 11:53

## 2017-12-13 RX ADMIN — Medication 2: at 11:54

## 2017-12-13 NOTE — ED ADULT NURSE NOTE - ED STAT RN HANDOFF DETAILS
received Pt alert and verbally responsive NAD VS WNL hand off report given to Eamon RN awaiting for disposition

## 2017-12-13 NOTE — H&P ADULT - PROBLEM SELECTOR PLAN 1
Likely vasovagal   CT head negative for infarct, hemorrhage  no focal neurological deficits on physical exam  admit to telemetry to monitor for arrythmia  Last TTE in June, shows mild grade 1 DD, moderate AS, EF >55%  cardiology Likely vasovagal from presentation  CT head negative for infarct, hemorrhage  ECG NSR at 98 bpm, RBBB  No focal neurological deficits on physical exam  admit to telemetry to monitor for arrythmia  Last TTE in June, shows mild grade 1 DD, moderate AS, EF >55%  cardiology Dr Guzman Likely vasovagal from presentation  CT head negative for infarct, hemorrhage  ECG NSR at 98 bpm, RBBB  No focal neurological deficits on physical exam  admit to telemetry to monitor for arrythmia  Last TTE in June, shows mild grade 1 DD, moderate AS, EF >55%, no need to repeat for now

## 2017-12-13 NOTE — ED PROVIDER NOTE - OBJECTIVE STATEMENT
Pt states at 10pm she was having bowel movement on toilet and felt weak. Son states wittiness pt with LOC x 5 minutes. On evaluation pt is well appearing and w/o complaints. No chest pain, no shortness of breath. No HA or vision changes.

## 2017-12-13 NOTE — DISCHARGE NOTE ADULT - PATIENT PORTAL LINK FT
“You can access the FollowHealth Patient Portal, offered by [facility], by registering with the following website: [email or web address]”

## 2017-12-13 NOTE — DISCHARGE NOTE ADULT - MEDICATION SUMMARY - MEDICATIONS TO TAKE
I will START or STAY ON the medications listed below when I get home from the hospital:    aspirin 81 mg oral tablet  -- 1 tab(s) by mouth 2 times a day  -- Indication: For History Of stroke    losartan 50 mg oral tablet  -- 1 tab(s) by mouth once a day  -- Do not take this drug if you are pregnant.  It is very important that you take or use this exactly as directed.  Do not skip doses or discontinue unless directed by your doctor.  Some non-prescription drugs may aggravate your condition.  Read all labels carefully.  If a warning appears, check with your doctor before taking.    -- Indication: For Hypertension    metFORMIN 750 mg oral tablet, extended release  -- 1  by mouth 2 times a day  -- Indication: For Diabetes    Crestor 10 mg oral tablet  -- 1 tab(s) by mouth once a day (at bedtime)  -- Indication: For Hyperlipidemia

## 2017-12-13 NOTE — H&P ADULT - PROBLEM SELECTOR PLAN 3
continue home losartan 50mg daily  hold hydrochlorothiazide as patient appears mildly dehydrated continue home losartan 50mg daily  Monitor the Bp

## 2017-12-13 NOTE — H&P ADULT - ASSESSMENT
76 year-old female, PMH of CVA, small SAH, HTN, DM type II, carotid stenosis s/p endarterectomy, fibromyalgia , presented with syncope. 77 year-old female, PMH of CVA, small SAH, HTN, DM type II, carotid stenosis s/p endarterectomy, fibromyalgia , presented with syncope.

## 2017-12-13 NOTE — ED PROVIDER NOTE - MEDICAL DECISION MAKING DETAILS
MAR and Dr. Davis endorsed. Pt agrees with admission for cardiac monitoring, I had a detailed discussion with the patient and/or guardian regarding the historical points, exam findings, and any diagnostic results supporting the admit diagnosis.

## 2017-12-13 NOTE — H&P ADULT - HISTORY OF PRESENT ILLNESS
76 year-old female,  from home (lives with son) with PMH of CVA, small SAH, HTN, DM type II, carotid stenosis s/p endarterectomy, fibromyalgia , presented with dizziness, lightheadedness. Patient states that at 10pm she was having bowel movement on toilet and felt weak. Son states wittiness patient with LOC x 5 minutes. No chest pain, no shortness of breath.     In the ED initial vitals stable, labs with mild wbc count of 15, Fs 170, T1 is 0.076, Ct head negative, CXR clear, got aspirin 325, admitted to telemetry for syncope. 76 year-old female, from home (lives with son) with PMH of CVA, small SAH, HTN, DM type II, carotid stenosis s/p endarterectomy, fibromyalgia , presented with syncopal episode today. Patient states that at 10pm she was having bowel movement on toilet and felt weak. No fall, headache, blurry vision, room spinning, chest pain, no shortness of breath, nausea, vomiting, urinary or bowel complains. Last visit to PCP about months ago for routine checkup.    In the ED initial vitals stable, labs with mild wbc count of 15, Fs 170, T1 is 0.076, Ct head negative, CXR clear, got aspirin 325, admitted to telemetry for syncope. 77 year-old female, from home (lives with son) with PMH of CVA, small SAH, HTN, DM type II, carotid stenosis s/p endarterectomy, fibromyalgia , presented with syncopal episode today. Patient states that at 10pm she was having bowel movement on toilet and felt weak. No fall, headache, blurry vision, room spinning, chest pain, no shortness of breath, nausea, vomiting, urinary or bowel complains. Last visit to PCP about months ago for routine checkup.    In the ED initial vitals stable, labs with mild wbc count of 15, Fs 170, T1 is 0.076, Ct head negative, CXR clear, got aspirin 325, admitted to telemetry for syncope.

## 2017-12-13 NOTE — DISCHARGE NOTE ADULT - PLAN OF CARE
Likely vasovagal from presentation since patient refers that she was in the bathroom straining.   CT head negative for infarct, hemorrhage. ECG NSR at 98 bpm, RBBB. Echocardiogram was recently done Normal left ventricular function. Mild diastolic dysfunction (Stage I). No focal neurological deficits on physical exam. Admitted and monitored to monitor for arrythmia. Likely due to demand ischemia, no chest pain. EKG shows RBBB (old), NSR 98bpm Continue with blood pressure medication. Maintain a healthy diet that consist of low sugar, low fat, low sodium diet. Exercise frequently if possible.  Follow up with primary care physician in one week after discharge. Continue with your blood sugar medication.  You must maintain a healthy diet that consist of low sugar, low fat, low sodium diet. Exercise frequently if possible. Consider repeating your Hemoglobin A1c within 3 months after discharge to monitor your average blood glucose control. Follow up with primary care physician in one week after discharge. Fall prevention Likely vasovagal from presentation since patient refers that she was in the bathroom straining. CT head negative for infarct, hemorrhage. ECG NSR at 98 bpm, RBBB. Echocardiogram was recently done Normal left ventricular function. Mild diastolic dysfunction (Stage I). No focal neurological deficits on physical exam. Admitted and monitored to monitor for arrythmia. Patient was also complaining of leg pain that has been chronic and controlled with pain medications.  D-dimer was sent with negative results. Patient ambulating with no difficulty. Physical therapy evaluated and recommenced home physical therapy. Discussed with son who will follow up with PCP.

## 2017-12-13 NOTE — H&P ADULT - RS GEN PE MLT RESP DETAILS PC
breath sounds equal/airway patent/normal/good air movement no chest wall tenderness/no subcutaneous emphysema/respirations non-labored/no rhonchi/no wheezes/no intercostal retractions/no rales/good air movement/breath sounds equal/airway patent/clear to auscultation bilaterally/normal

## 2017-12-13 NOTE — H&P ADULT - PROBLEM SELECTOR PLAN 6
improved VTE risk score 1, encourage ambulation Has mild leukocytosis of 15.8, could be reactive   CXR with no infiltrate, f/up on UA

## 2017-12-13 NOTE — H&P ADULT - PROBLEM SELECTOR PLAN 2
likely due to demand ischemia, no chest pain  EKG shows RBBB (old), NSR 90bpm  cardiac enzymes show troponin of 0.076,   continue telemetry monitoring., trend the cardiac enzymes likely due to demand ischemia, no chest pain  EKG shows RBBB (old), NSR 98bpm  cardiac enzymes show troponin of 0.076,   continue telemetry monitoring., trend the cardiac enzymes

## 2017-12-13 NOTE — DISCHARGE NOTE ADULT - INSTRUCTIONS
Recommend the DASH Diet that is Carbohydarte consistent due to diabetes:  This emphasizes vegetables, fruits, and fat-free or low-fat dairy products.  Includes whole grains, fish, poultry, beans, seeds, nuts, and vegetable oils. Please limit sodium, sweets, sugary beverages, and red meats. Patient oriented on diet and refers to understand Recommend the DASH Diet that is Carbohydrate consistent due to diabetes:  This emphasizes vegetables, fruits, and fat-free or low-fat dairy products.  Includes whole grains, fish, poultry, beans, seeds, nuts, and vegetable oils. Please limit sodium, sweets, sugary beverages, and red meats. Patient oriented on diet and refers to understand

## 2017-12-13 NOTE — H&P ADULT - GASTROINTESTINAL DETAILS
normal/nontender/soft/no masses palpable/no rebound tenderness/no rigidity/no distention/no bruit/bowel sounds normal/no guarding/no organomegaly

## 2017-12-13 NOTE — DISCHARGE NOTE ADULT - CARE PLAN
Principal Discharge DX:	Syncope  Instructions for follow-up, activity and diet:	Likely vasovagal from presentation since patient refers that she was in the bathroom straining.   CT head negative for infarct, hemorrhage. ECG NSR at 98 bpm, RBBB. Echocardiogram was recently done Normal left ventricular function. Mild diastolic dysfunction (Stage I). No focal neurological deficits on physical exam. Admitted and monitored to monitor for arrythmia.  Secondary Diagnosis:	Cardiac enzymes elevated  Instructions for follow-up, activity and diet:	Likely due to demand ischemia, no chest pain. EKG shows RBBB (old), NSR 98bpm  Secondary Diagnosis:	Essential hypertension  Instructions for follow-up, activity and diet:	Continue with blood pressure medication. Maintain a healthy diet that consist of low sugar, low fat, low sodium diet. Exercise frequently if possible.  Follow up with primary care physician in one week after discharge.  Secondary Diagnosis:	Type 2 diabetes mellitus  Instructions for follow-up, activity and diet:	Continue with your blood sugar medication.  You must maintain a healthy diet that consist of low sugar, low fat, low sodium diet. Exercise frequently if possible. Consider repeating your Hemoglobin A1c within 3 months after discharge to monitor your average blood glucose control. Follow up with primary care physician in one week after discharge. Principal Discharge DX:	Syncope  Goal:	Fall prevention  Instructions for follow-up, activity and diet:	Likely vasovagal from presentation since patient refers that she was in the bathroom straining. CT head negative for infarct, hemorrhage. ECG NSR at 98 bpm, RBBB. Echocardiogram was recently done Normal left ventricular function. Mild diastolic dysfunction (Stage I). No focal neurological deficits on physical exam. Admitted and monitored to monitor for arrythmia. Patient was also complaining of leg pain that has been chronic and controlled with pain medications.  D-dimer was sent with negative results. Patient ambulating with no difficulty. Physical therapy evaluated and recommenced home physical therapy. Discussed with son who will follow up with PCP.  Secondary Diagnosis:	Cardiac enzymes elevated  Instructions for follow-up, activity and diet:	Likely due to demand ischemia, no chest pain. EKG shows RBBB (old), NSR 98bpm  Secondary Diagnosis:	Essential hypertension  Instructions for follow-up, activity and diet:	Continue with blood pressure medication. Maintain a healthy diet that consist of low sugar, low fat, low sodium diet. Exercise frequently if possible.  Follow up with primary care physician in one week after discharge.  Secondary Diagnosis:	Type 2 diabetes mellitus  Instructions for follow-up, activity and diet:	Continue with your blood sugar medication.  You must maintain a healthy diet that consist of low sugar, low fat, low sodium diet. Exercise frequently if possible. Consider repeating your Hemoglobin A1c within 3 months after discharge to monitor your average blood glucose control. Follow up with primary care physician in one week after discharge.

## 2017-12-13 NOTE — H&P ADULT - MUSCULOSKELETAL
detailed exam ROM intact/normal/no joint swelling/no joint erythema/no joint warmth/normal strength/no calf tenderness

## 2017-12-13 NOTE — H&P ADULT - ATTENDING COMMENTS
Patient seen/evaluated at bedside. I agree with the resident H&P note/outlined plan of care. My independent findings and conclusions are documented.

## 2017-12-13 NOTE — H&P ADULT - FAMILY HISTORY
Family history of acute myocardial infarction     Sibling  Still living? Unknown  Family history of cerebrovascular accident (CVA), Age at diagnosis: Age Unknown

## 2017-12-13 NOTE — PHYSICAL THERAPY INITIAL EVALUATION ADULT - GENERAL OBSERVATIONS, REHAB EVAL
Consult received, EMR, radiology and labs reviewed. Patient received supine in bed, NAD, states feels better. Patient agreed to EVALUATION from Physical Therapist.

## 2017-12-13 NOTE — DISCHARGE NOTE ADULT - ADDITIONAL INSTRUCTIONS
Follow up with Primary Care Physician within one week after discharge to inform of your recent hospitalization. Patient/Family was oriented on instruction.

## 2017-12-13 NOTE — ED ADULT NURSE NOTE - OBJECTIVE STATEMENT
Pt states that she is having syncopal episode that happened today. Pt denies chest pain. son witnessed pt passsing out. pt do not remember. denies head injury. denies chest pain, sob

## 2017-12-13 NOTE — DISCHARGE NOTE ADULT - HOSPITAL COURSE
76 year-old female, from home (lives with son) with PMH of CVA, small SAH, HTN, DM type II, carotid stenosis s/p endarterectomy, fibromyalgia , presented with syncopal episode today. Patient states that at 10pm she was having bowel movement on toilet and felt weak. No fall, headache, blurry vision, room spinning, chest pain, no shortness of breath, nausea, vomiting, urinary or bowel complains. Last visit to PCP about months ago for routine checkup. In the ED initial vitals stable, labs with mild wbc count of 15, Fs 170, T1 is 0.076, Ct head negative, CXR clear, got aspirin 325, admitted to telemetry for syncope.     Likely vasovagal from presentation since patient refers that she was in the bathroom straining. CT head negative for infarct, hemorrhage. ECG NSR at 98 bpm, RBBB. Echocardiogram was recently done Normal left ventricular function. Mild diastolic dysfunction (Stage I). No focal neurological deficits on physical exam. Admitted and monitored to monitor for arrythmia. Patient was also complaining of leg pain that has been chronic and controlled with pain medications.  D-dimer was sent with negative results. Patient ambulating with no difficulty. Physical therapy evaluated and recommenced home physical therapy.    Given patient's improved clinical status and current hemodynamic stability, decision was made to discharge. Please refer to patient's complete medical chart with documents for a full hospital course, for this is only a brief summary. 77 year-old female, from home (lives with son) with PMH of CVA, small SAH, HTN, DM type II, carotid stenosis s/p endarterectomy, fibromyalgia , presented with syncopal episode today. Patient states that at 10pm she was having bowel movement on toilet and felt weak. No fall, headache, blurry vision, room spinning, chest pain, no shortness of breath, nausea, vomiting, urinary or bowel complains. Last visit to PCP about months ago for routine checkup. In the ED initial vitals stable, labs with mild wbc count of 15, Fs 170, T1 is 0.076, Ct head negative, CXR clear, got aspirin 325, admitted to telemetry for syncope.     Likely vasovagal from presentation since patient refers that she was in the bathroom straining. CT head negative for infarct, hemorrhage. ECG NSR at 98 bpm, RBBB. Echocardiogram was recently done Normal left ventricular function. Mild diastolic dysfunction (Stage I). No focal neurological deficits on physical exam. Admitted and monitored to monitor for arrythmia. Patient was also complaining of leg pain that has been chronic and controlled with pain medications.  D-dimer was sent with negative results. Patient ambulating with no difficulty. Physical therapy evaluated and recommenced home physical therapy.    Given patient's improved clinical status and current hemodynamic stability, decision was made to discharge. Please refer to patient's complete medical chart with documents for a full hospital course, for this is only a brief summary.

## 2017-12-13 NOTE — H&P ADULT - PROBLEM SELECTOR PLAN 5
continue aspirin 162mg daily  crestor non-formulary, continue atorvastatin 40mg daily    f/up on lipid profile

## 2017-12-14 LAB — 24R-OH-CALCIDIOL SERPL-MCNC: 23.2 NG/ML — LOW (ref 30–80)

## 2018-01-01 NOTE — CONSULT NOTE ADULT - SUBJECTIVE AND OBJECTIVE BOX
77 y/o Female w/ PMH of CVA ( Right frontal MCA , s/p TPA in Jan2017), small SAH ( acute in Right frontal region, Jan 2017)  Fibromyalgia,  HTN ( Losartan HCTz 100/25),  DM2 ( Metformin 750mg BID, last HBA1c is 7.3 ) admitted to medical services for transient Cerebral Ischemia, currently s/p lt CEA 5/15.   Urology consult called re urinary retention. Pt seen and examined at bedside. Denies any hx of previous UTI, no kidney stones, no issues w/ urinating. Denies abd pain, no back pain. Pt during the hospital stay developed sepsis 2/2 UTI, Ucx positive for Enterococcus F; pt treated w/ Vanco and Zosyn. At this time denies fever, chills, SOB or CP. no

## 2018-05-02 ENCOUNTER — APPOINTMENT (OUTPATIENT)
Dept: INTERNAL MEDICINE | Facility: CLINIC | Age: 78
End: 2018-05-02
Payer: COMMERCIAL

## 2018-05-02 VITALS
RESPIRATION RATE: 16 BRPM | SYSTOLIC BLOOD PRESSURE: 136 MMHG | HEART RATE: 79 BPM | DIASTOLIC BLOOD PRESSURE: 84 MMHG | TEMPERATURE: 97.6 F | WEIGHT: 147 LBS | BODY MASS INDEX: 31.71 KG/M2 | HEIGHT: 57 IN | OXYGEN SATURATION: 96 %

## 2018-05-02 DIAGNOSIS — G89.29 DORSALGIA, UNSPECIFIED: ICD-10-CM

## 2018-05-02 DIAGNOSIS — E55.9 VITAMIN D DEFICIENCY, UNSPECIFIED: ICD-10-CM

## 2018-05-02 DIAGNOSIS — E11.9 TYPE 2 DIABETES MELLITUS W/OUT COMPLICATIONS: ICD-10-CM

## 2018-05-02 DIAGNOSIS — M54.9 DORSALGIA, UNSPECIFIED: ICD-10-CM

## 2018-05-02 DIAGNOSIS — Z13.820 ENCOUNTER FOR SCREENING FOR OSTEOPOROSIS: ICD-10-CM

## 2018-05-02 DIAGNOSIS — N39.0 URINARY TRACT INFECTION, SITE NOT SPECIFIED: ICD-10-CM

## 2018-05-02 LAB
25(OH)D3 SERPL-MCNC: 27.1 NG/ML
ALBUMIN SERPL ELPH-MCNC: 4.2 G/DL
ALP BLD-CCNC: 59 U/L
ALT SERPL-CCNC: 7 U/L
ANION GAP SERPL CALC-SCNC: 16 MMOL/L
AST SERPL-CCNC: 20 U/L
BILIRUB SERPL-MCNC: 0.6 MG/DL
BUN SERPL-MCNC: 16 MG/DL
CALCIUM SERPL-MCNC: 9.8 MG/DL
CHLORIDE SERPL-SCNC: 96 MMOL/L
CHOLEST SERPL-MCNC: 137 MG/DL
CHOLEST/HDLC SERPL: 1.8 RATIO
CO2 SERPL-SCNC: 22 MMOL/L
CREAT SERPL-MCNC: 0.91 MG/DL
ESTIMATED AVERAGE GLUCOSE: 143 MG/DL
GLUCOSE SERPL-MCNC: 90 MG/DL
HBA1C MFR BLD HPLC: 6.6 %
HDLC SERPL-MCNC: 76 MG/DL
LDLC SERPL CALC-MCNC: 45 MG/DL
POTASSIUM SERPL-SCNC: 5.3 MMOL/L
PROT SERPL-MCNC: 8 G/DL
SODIUM SERPL-SCNC: 134 MMOL/L
TRIGL SERPL-MCNC: 78 MG/DL

## 2018-05-02 PROCEDURE — 99214 OFFICE O/P EST MOD 30 MIN: CPT

## 2018-05-02 RX ORDER — DICLOFENAC SODIUM 10 MG/G
1 GEL TOPICAL
Qty: 1 | Refills: 3 | Status: DISCONTINUED | COMMUNITY
Start: 2017-11-30 | End: 2018-05-02

## 2018-05-02 RX ORDER — DICLOFENAC SODIUM 20 MG/G
2 SOLUTION TOPICAL
Qty: 112 | Refills: 4 | Status: DISCONTINUED | COMMUNITY
Start: 2017-11-28 | End: 2018-05-02

## 2018-05-03 ENCOUNTER — APPOINTMENT (OUTPATIENT)
Dept: RHEUMATOLOGY | Facility: CLINIC | Age: 78
End: 2018-05-03
Payer: COMMERCIAL

## 2018-05-03 VITALS
HEART RATE: 98 BPM | SYSTOLIC BLOOD PRESSURE: 170 MMHG | RESPIRATION RATE: 16 BRPM | OXYGEN SATURATION: 95 % | HEIGHT: 57 IN | WEIGHT: 147 LBS | TEMPERATURE: 98.3 F | BODY MASS INDEX: 31.71 KG/M2 | DIASTOLIC BLOOD PRESSURE: 95 MMHG

## 2018-05-03 DIAGNOSIS — M17.12 UNILATERAL PRIMARY OSTEOARTHRITIS, LEFT KNEE: ICD-10-CM

## 2018-05-03 DIAGNOSIS — S16.1XXA STRAIN OF MUSCLE, FASCIA AND TENDON AT NECK LEVEL, INITIAL ENCOUNTER: ICD-10-CM

## 2018-05-03 LAB
CREAT SPEC-SCNC: 105 MG/DL
MICROALBUMIN 24H UR DL<=1MG/L-MCNC: 2.8 MG/DL
MICROALBUMIN/CREAT 24H UR-RTO: 27 MG/G

## 2018-05-03 PROCEDURE — 99213 OFFICE O/P EST LOW 20 MIN: CPT

## 2018-05-04 PROBLEM — M17.12 PRIMARY OSTEOARTHRITIS OF LEFT KNEE: Status: ACTIVE | Noted: 2017-11-16

## 2018-05-04 PROBLEM — S16.1XXA CERVICAL STRAIN: Status: ACTIVE | Noted: 2018-05-04

## 2018-05-14 ENCOUNTER — APPOINTMENT (OUTPATIENT)
Dept: NEUROLOGY | Facility: CLINIC | Age: 78
End: 2018-05-14

## 2018-05-18 ENCOUNTER — RX RENEWAL (OUTPATIENT)
Age: 78
End: 2018-05-18

## 2018-05-18 ENCOUNTER — APPOINTMENT (OUTPATIENT)
Dept: ORTHOPEDIC SURGERY | Facility: CLINIC | Age: 78
End: 2018-05-18
Payer: COMMERCIAL

## 2018-05-18 VITALS
DIASTOLIC BLOOD PRESSURE: 101 MMHG | SYSTOLIC BLOOD PRESSURE: 171 MMHG | HEIGHT: 58 IN | BODY MASS INDEX: 30.44 KG/M2 | WEIGHT: 145 LBS | HEART RATE: 99 BPM

## 2018-05-18 DIAGNOSIS — M43.16 SPONDYLOLISTHESIS, LUMBAR REGION: ICD-10-CM

## 2018-05-18 DIAGNOSIS — M47.812 SPONDYLOSIS W/OUT MYELOPATHY OR RADICULOPATHY, CERVICAL REGION: ICD-10-CM

## 2018-05-18 PROCEDURE — 72100 X-RAY EXAM L-S SPINE 2/3 VWS: CPT

## 2018-05-18 PROCEDURE — 99203 OFFICE O/P NEW LOW 30 MIN: CPT

## 2018-05-18 PROCEDURE — 72040 X-RAY EXAM NECK SPINE 2-3 VW: CPT

## 2018-05-20 ENCOUNTER — RX RENEWAL (OUTPATIENT)
Age: 78
End: 2018-05-20

## 2018-05-20 RX ORDER — ROSUVASTATIN CALCIUM 10 MG/1
10 TABLET, FILM COATED ORAL
Qty: 30 | Refills: 2 | Status: ACTIVE | COMMUNITY
Start: 2018-05-20 | End: 1900-01-01

## 2018-06-15 ENCOUNTER — APPOINTMENT (OUTPATIENT)
Dept: ORTHOPEDIC SURGERY | Facility: CLINIC | Age: 78
End: 2018-06-15
Payer: COMMERCIAL

## 2018-06-15 VITALS
BODY MASS INDEX: 30.44 KG/M2 | SYSTOLIC BLOOD PRESSURE: 172 MMHG | OXYGEN SATURATION: 99 % | DIASTOLIC BLOOD PRESSURE: 105 MMHG | WEIGHT: 145 LBS | HEART RATE: 85 BPM | HEIGHT: 58 IN | TEMPERATURE: 97.6 F

## 2018-06-15 DIAGNOSIS — S32.009A UNSPECIFIED FRACTURE OF UNSPECIFIED LUMBAR VERTEBRA, INITIAL ENCOUNTER FOR CLOSED FRACTURE: ICD-10-CM

## 2018-06-15 PROCEDURE — 99214 OFFICE O/P EST MOD 30 MIN: CPT

## 2018-07-20 ENCOUNTER — CHART COPY (OUTPATIENT)
Age: 78
End: 2018-07-20

## 2018-07-22 ENCOUNTER — RX RENEWAL (OUTPATIENT)
Age: 78
End: 2018-07-22

## 2018-07-22 RX ORDER — METFORMIN HYDROCHLORIDE 850 MG/1
850 TABLET, COATED ORAL TWICE DAILY
Qty: 60 | Refills: 5 | Status: ACTIVE | COMMUNITY
Start: 2017-09-26 | End: 1900-01-01

## 2018-07-25 PROBLEM — R01.1 CARDIAC MURMUR, UNSPECIFIED: Chronic | Status: ACTIVE | Noted: 2017-06-21

## 2018-07-28 PROBLEM — M47.812 CERVICAL SPONDYLOSIS: Status: ACTIVE | Noted: 2018-05-18

## 2018-08-03 ENCOUNTER — APPOINTMENT (OUTPATIENT)
Dept: ORTHOPEDIC SURGERY | Facility: CLINIC | Age: 78
End: 2018-08-03
Payer: COMMERCIAL

## 2018-08-03 DIAGNOSIS — M54.12 RADICULOPATHY, CERVICAL REGION: ICD-10-CM

## 2018-08-03 DIAGNOSIS — M50.30 OTHER CERVICAL DISC DEGENERATION, UNSPECIFIED CERVICAL REGION: ICD-10-CM

## 2018-08-03 PROCEDURE — 99214 OFFICE O/P EST MOD 30 MIN: CPT

## 2018-08-22 ENCOUNTER — APPOINTMENT (OUTPATIENT)
Dept: UROLOGY | Facility: CLINIC | Age: 78
End: 2018-08-22
Payer: COMMERCIAL

## 2018-08-22 ENCOUNTER — RX RENEWAL (OUTPATIENT)
Age: 78
End: 2018-08-22

## 2018-08-22 VITALS
WEIGHT: 145 LBS | HEIGHT: 55 IN | SYSTOLIC BLOOD PRESSURE: 132 MMHG | BODY MASS INDEX: 33.56 KG/M2 | OXYGEN SATURATION: 98 % | DIASTOLIC BLOOD PRESSURE: 96 MMHG | HEART RATE: 88 BPM | RESPIRATION RATE: 18 BRPM

## 2018-08-22 DIAGNOSIS — I10 ESSENTIAL (PRIMARY) HYPERTENSION: ICD-10-CM

## 2018-08-22 DIAGNOSIS — E78.5 HYPERLIPIDEMIA, UNSPECIFIED: ICD-10-CM

## 2018-08-22 DIAGNOSIS — M25.561 PAIN IN RIGHT KNEE: ICD-10-CM

## 2018-08-22 DIAGNOSIS — R35.0 FREQUENCY OF MICTURITION: ICD-10-CM

## 2018-08-22 DIAGNOSIS — M25.562 PAIN IN RIGHT KNEE: ICD-10-CM

## 2018-08-22 DIAGNOSIS — Z86.19 PERSONAL HISTORY OF OTHER INFECTIOUS AND PARASITIC DISEASES: ICD-10-CM

## 2018-08-22 DIAGNOSIS — N39.0 URINARY TRACT INFECTION, SITE NOT SPECIFIED: ICD-10-CM

## 2018-08-22 DIAGNOSIS — E11.9 TYPE 2 DIABETES MELLITUS W/OUT COMPLICATIONS: ICD-10-CM

## 2018-08-22 DIAGNOSIS — R30.0 DYSURIA: ICD-10-CM

## 2018-08-22 LAB
APPEARANCE: CLEAR
BACTERIA: NEGATIVE
BILIRUBIN URINE: NEGATIVE
BLOOD URINE: NEGATIVE
COLOR: YELLOW
GLUCOSE QUALITATIVE U: NEGATIVE MG/DL
KETONES URINE: NEGATIVE
LEUKOCYTE ESTERASE URINE: NEGATIVE
MICROSCOPIC-UA: NORMAL
NITRITE URINE: NEGATIVE
PH URINE: 6.5
PROTEIN URINE: NEGATIVE MG/DL
RED BLOOD CELLS URINE: 1 /HPF
SPECIFIC GRAVITY URINE: 1.01
SQUAMOUS EPITHELIAL CELLS: 0 /HPF
UROBILINOGEN URINE: NEGATIVE MG/DL
WHITE BLOOD CELLS URINE: 1 /HPF

## 2018-08-22 PROCEDURE — 99214 OFFICE O/P EST MOD 30 MIN: CPT

## 2018-08-22 RX ORDER — ROSUVASTATIN CALCIUM 5 MG/1
5 TABLET, FILM COATED ORAL
Refills: 0 | Status: ACTIVE | COMMUNITY

## 2018-08-24 LAB — BACTERIA UR CULT: NORMAL

## 2018-08-27 ENCOUNTER — MOBILE ON CALL (OUTPATIENT)
Age: 78
End: 2018-08-27

## 2018-08-27 RX ORDER — FLUCONAZOLE 150 MG/1
150 TABLET ORAL
Qty: 1 | Refills: 0 | Status: ACTIVE | COMMUNITY
Start: 2018-08-22 | End: 1900-01-01

## 2018-09-28 RX ORDER — CELECOXIB 100 MG/1
100 CAPSULE ORAL DAILY
Qty: 30 | Refills: 5 | Status: ACTIVE | COMMUNITY

## 2018-10-02 ENCOUNTER — APPOINTMENT (OUTPATIENT)
Dept: VASCULAR SURGERY | Facility: CLINIC | Age: 78
End: 2018-10-02

## 2018-10-03 ENCOUNTER — APPOINTMENT (OUTPATIENT)
Dept: VASCULAR SURGERY | Facility: CLINIC | Age: 78
End: 2018-10-03
Payer: COMMERCIAL

## 2018-10-03 PROCEDURE — 93880 EXTRACRANIAL BILAT STUDY: CPT

## 2018-11-19 ENCOUNTER — RX RENEWAL (OUTPATIENT)
Age: 78
End: 2018-11-19

## 2018-11-19 RX ORDER — LOSARTAN POTASSIUM 50 MG/1
50 TABLET, FILM COATED ORAL DAILY
Qty: 90 | Refills: 1 | Status: ACTIVE | COMMUNITY
Start: 2017-06-22 | End: 1900-01-01

## 2019-04-01 NOTE — PHYSICAL THERAPY INITIAL EVALUATION ADULT - FUNCTIONAL LIMITATIONS, PT EVAL
Number Of Freeze-Thaw Cycles: 1 freeze-thaw cycle Detail Level: Detailed Post-Care Instructions: I reviewed with the patient in detail post-care instructions. Patient is to wear sunprotection, and avoid picking at any of the treated lesions. Pt may apply Vaseline to crusted or scabbing areas. Duration Of Freeze Thaw-Cycle (Seconds): 2 Render Post-Care Instructions In Note?: no Consent: The patient's consent was obtained including but not limited to risks of crusting, scabbing, blistering, scarring, darker or lighter pigmentary change, recurrence, incomplete removal and infection. self-care

## 2019-09-19 ENCOUNTER — APPOINTMENT (OUTPATIENT)
Dept: VASCULAR SURGERY | Facility: CLINIC | Age: 79
End: 2019-09-19

## 2019-09-19 ENCOUNTER — APPOINTMENT (OUTPATIENT)
Dept: VASCULAR SURGERY | Facility: CLINIC | Age: 79
End: 2019-09-19
Payer: MEDICARE

## 2019-09-19 PROCEDURE — 93880 EXTRACRANIAL BILAT STUDY: CPT

## 2019-11-19 ENCOUNTER — EMERGENCY (EMERGENCY)
Facility: HOSPITAL | Age: 79
LOS: 1 days | Discharge: HOME | End: 2019-11-19
Attending: EMERGENCY MEDICINE
Payer: MEDICARE

## 2019-11-19 VITALS
DIASTOLIC BLOOD PRESSURE: 80 MMHG | HEART RATE: 87 BPM | SYSTOLIC BLOOD PRESSURE: 137 MMHG | RESPIRATION RATE: 18 BRPM | OXYGEN SATURATION: 99 %

## 2019-11-19 VITALS
DIASTOLIC BLOOD PRESSURE: 89 MMHG | WEIGHT: 145.06 LBS | RESPIRATION RATE: 18 BRPM | HEART RATE: 96 BPM | HEIGHT: 58 IN | TEMPERATURE: 98 F | OXYGEN SATURATION: 97 % | SYSTOLIC BLOOD PRESSURE: 149 MMHG

## 2019-11-19 DIAGNOSIS — Z98.890 OTHER SPECIFIED POSTPROCEDURAL STATES: Chronic | ICD-10-CM

## 2019-11-19 DIAGNOSIS — Z90.49 ACQUIRED ABSENCE OF OTHER SPECIFIED PARTS OF DIGESTIVE TRACT: Chronic | ICD-10-CM

## 2019-11-19 LAB
ALBUMIN SERPL ELPH-MCNC: 3.8 G/DL — SIGNIFICANT CHANGE UP (ref 3.5–5)
ALP SERPL-CCNC: 73 U/L — SIGNIFICANT CHANGE UP (ref 40–120)
ALT FLD-CCNC: 22 U/L DA — SIGNIFICANT CHANGE UP (ref 10–60)
ANION GAP SERPL CALC-SCNC: 9 MMOL/L — SIGNIFICANT CHANGE UP (ref 5–17)
AST SERPL-CCNC: 20 U/L — SIGNIFICANT CHANGE UP (ref 10–40)
BASOPHILS # BLD AUTO: 0.03 K/UL — SIGNIFICANT CHANGE UP (ref 0–0.2)
BASOPHILS NFR BLD AUTO: 0.3 % — SIGNIFICANT CHANGE UP (ref 0–2)
BILIRUB SERPL-MCNC: 0.5 MG/DL — SIGNIFICANT CHANGE UP (ref 0.2–1.2)
BUN SERPL-MCNC: 21 MG/DL — HIGH (ref 7–18)
CALCIUM SERPL-MCNC: 9.4 MG/DL — SIGNIFICANT CHANGE UP (ref 8.4–10.5)
CHLORIDE SERPL-SCNC: 101 MMOL/L — SIGNIFICANT CHANGE UP (ref 96–108)
CK MB BLD-MCNC: 2.1 % — SIGNIFICANT CHANGE UP (ref 0–3.5)
CK MB CFR SERPL CALC: 2.8 NG/ML — SIGNIFICANT CHANGE UP (ref 0–3.6)
CK SERPL-CCNC: 134 U/L — SIGNIFICANT CHANGE UP (ref 21–215)
CO2 SERPL-SCNC: 24 MMOL/L — SIGNIFICANT CHANGE UP (ref 22–31)
CREAT SERPL-MCNC: 0.91 MG/DL — SIGNIFICANT CHANGE UP (ref 0.5–1.3)
EOSINOPHIL # BLD AUTO: 0.09 K/UL — SIGNIFICANT CHANGE UP (ref 0–0.5)
EOSINOPHIL NFR BLD AUTO: 0.8 % — SIGNIFICANT CHANGE UP (ref 0–6)
GLUCOSE SERPL-MCNC: 159 MG/DL — HIGH (ref 70–99)
HCT VFR BLD CALC: 32.6 % — LOW (ref 34.5–45)
HGB BLD-MCNC: 11.2 G/DL — LOW (ref 11.5–15.5)
IMM GRANULOCYTES NFR BLD AUTO: 0.4 % — SIGNIFICANT CHANGE UP (ref 0–1.5)
INR BLD: 0.99 RATIO — SIGNIFICANT CHANGE UP (ref 0.88–1.16)
LYMPHOCYTES # BLD AUTO: 0.87 K/UL — LOW (ref 1–3.3)
LYMPHOCYTES # BLD AUTO: 8.2 % — LOW (ref 13–44)
MAGNESIUM SERPL-MCNC: 2 MG/DL — SIGNIFICANT CHANGE UP (ref 1.6–2.6)
MCHC RBC-ENTMCNC: 30.1 PG — SIGNIFICANT CHANGE UP (ref 27–34)
MCHC RBC-ENTMCNC: 34.4 GM/DL — SIGNIFICANT CHANGE UP (ref 32–36)
MCV RBC AUTO: 87.6 FL — SIGNIFICANT CHANGE UP (ref 80–100)
MONOCYTES # BLD AUTO: 0.59 K/UL — SIGNIFICANT CHANGE UP (ref 0–0.9)
MONOCYTES NFR BLD AUTO: 5.5 % — SIGNIFICANT CHANGE UP (ref 2–14)
NEUTROPHILS # BLD AUTO: 9.02 K/UL — HIGH (ref 1.8–7.4)
NEUTROPHILS NFR BLD AUTO: 84.8 % — HIGH (ref 43–77)
NRBC # BLD: 0 /100 WBCS — SIGNIFICANT CHANGE UP (ref 0–0)
PHOSPHATE SERPL-MCNC: 3.4 MG/DL — SIGNIFICANT CHANGE UP (ref 2.5–4.5)
PLATELET # BLD AUTO: 283 K/UL — SIGNIFICANT CHANGE UP (ref 150–400)
POTASSIUM SERPL-MCNC: 4.1 MMOL/L — SIGNIFICANT CHANGE UP (ref 3.5–5.3)
POTASSIUM SERPL-SCNC: 4.1 MMOL/L — SIGNIFICANT CHANGE UP (ref 3.5–5.3)
PROT SERPL-MCNC: 7.9 G/DL — SIGNIFICANT CHANGE UP (ref 6–8.3)
PROTHROM AB SERPL-ACNC: 11 SEC — SIGNIFICANT CHANGE UP (ref 10–12.9)
RBC # BLD: 3.72 M/UL — LOW (ref 3.8–5.2)
RBC # FLD: 14.6 % — HIGH (ref 10.3–14.5)
SODIUM SERPL-SCNC: 134 MMOL/L — LOW (ref 135–145)
TROPONIN I SERPL-MCNC: 0.03 NG/ML — SIGNIFICANT CHANGE UP (ref 0–0.04)
WBC # BLD: 10.64 K/UL — HIGH (ref 3.8–10.5)
WBC # FLD AUTO: 10.64 K/UL — HIGH (ref 3.8–10.5)

## 2019-11-19 PROCEDURE — 99284 EMERGENCY DEPT VISIT MOD MDM: CPT

## 2019-11-19 PROCEDURE — 84100 ASSAY OF PHOSPHORUS: CPT

## 2019-11-19 PROCEDURE — 85027 COMPLETE CBC AUTOMATED: CPT

## 2019-11-19 PROCEDURE — 82550 ASSAY OF CK (CPK): CPT

## 2019-11-19 PROCEDURE — 85610 PROTHROMBIN TIME: CPT

## 2019-11-19 PROCEDURE — 96374 THER/PROPH/DIAG INJ IV PUSH: CPT

## 2019-11-19 PROCEDURE — 36415 COLL VENOUS BLD VENIPUNCTURE: CPT

## 2019-11-19 PROCEDURE — 83735 ASSAY OF MAGNESIUM: CPT

## 2019-11-19 PROCEDURE — 93005 ELECTROCARDIOGRAM TRACING: CPT

## 2019-11-19 PROCEDURE — 84484 ASSAY OF TROPONIN QUANT: CPT

## 2019-11-19 PROCEDURE — 73030 X-RAY EXAM OF SHOULDER: CPT

## 2019-11-19 PROCEDURE — 99284 EMERGENCY DEPT VISIT MOD MDM: CPT | Mod: 25

## 2019-11-19 PROCEDURE — 73030 X-RAY EXAM OF SHOULDER: CPT | Mod: 26,RT

## 2019-11-19 PROCEDURE — 73070 X-RAY EXAM OF ELBOW: CPT | Mod: 26,RT

## 2019-11-19 PROCEDURE — 96375 TX/PRO/DX INJ NEW DRUG ADDON: CPT

## 2019-11-19 PROCEDURE — 82553 CREATINE MB FRACTION: CPT

## 2019-11-19 PROCEDURE — 73070 X-RAY EXAM OF ELBOW: CPT

## 2019-11-19 PROCEDURE — 80053 COMPREHEN METABOLIC PANEL: CPT

## 2019-11-19 RX ORDER — SODIUM CHLORIDE 9 MG/ML
500 INJECTION INTRAMUSCULAR; INTRAVENOUS; SUBCUTANEOUS ONCE
Refills: 0 | Status: COMPLETED | OUTPATIENT
Start: 2019-11-19 | End: 2019-11-19

## 2019-11-19 RX ORDER — KETOROLAC TROMETHAMINE 30 MG/ML
15 SYRINGE (ML) INJECTION ONCE
Refills: 0 | Status: DISCONTINUED | OUTPATIENT
Start: 2019-11-19 | End: 2019-11-19

## 2019-11-19 RX ORDER — MORPHINE SULFATE 50 MG/1
4 CAPSULE, EXTENDED RELEASE ORAL ONCE
Refills: 0 | Status: DISCONTINUED | OUTPATIENT
Start: 2019-11-19 | End: 2019-11-19

## 2019-11-19 RX ORDER — MORPHINE SULFATE 50 MG/1
1 CAPSULE, EXTENDED RELEASE ORAL ONCE
Refills: 0 | Status: DISCONTINUED | OUTPATIENT
Start: 2019-11-19 | End: 2019-11-19

## 2019-11-19 RX ADMIN — MORPHINE SULFATE 1 MILLIGRAM(S): 50 CAPSULE, EXTENDED RELEASE ORAL at 16:37

## 2019-11-19 RX ADMIN — MORPHINE SULFATE 1 MILLIGRAM(S): 50 CAPSULE, EXTENDED RELEASE ORAL at 17:00

## 2019-11-19 RX ADMIN — Medication 15 MILLIGRAM(S): at 15:31

## 2019-11-19 RX ADMIN — SODIUM CHLORIDE 1000 MILLILITER(S): 9 INJECTION INTRAMUSCULAR; INTRAVENOUS; SUBCUTANEOUS at 15:01

## 2019-11-19 RX ADMIN — Medication 15 MILLIGRAM(S): at 15:01

## 2019-11-19 NOTE — ED PROVIDER NOTE - OBJECTIVE STATEMENT
80 yo female with HTN, DM, presents s/p fall with left shoulder pain and swelling sensation. Pt states was in a usual state of health and then fell today and attempted to catch her fall with her left arm. Pt states her right arm got stuck under body and now patient with c/o left shoudler and shaft pain. Pt denies hitting her head, LOC, lightheadedness, LOC, weakness before or after the fall, nausea, vomiting, neck pain, chest pain, abdominal pain. 78 yo female with HTN, DM, presents s/p fall with right shoulder pain and swelling sensation. Pt states was in a usual state of health and then fell today and attempted to catch her fall with her right arm. Pt states her right arm got stuck under body and now patient with c/o right shoulder and shaft pain. Pt denies hitting her head, LOC, lightheadedness, LOC, weakness before or after the fall, nausea, vomiting, neck pain, chest pain, abdominal pain.

## 2019-11-19 NOTE — ED PROVIDER NOTE - PATIENT PORTAL LINK FT
You can access the FollowMyHealth Patient Portal offered by [facility] by registering at the following website: [email or web address]. By joining [facility]’s FollowMyHealth portal, you will also be able to view your health information using other applications (apps) compatible with our system.

## 2019-11-19 NOTE — ED ADULT NURSE NOTE - OBJECTIVE STATEMENT
pt came in for c/o R shoulder pain. Pt sustained a mechanical fall at home, unk whether she lost her strength or tripped. pt fell on her R shoulder, s/p fall she has been unable to move her R shoulder & c/o pain. Pt denies head trauma/LOC. NO external injuries noted., breathing unlabored. Denies cp, sob, HA, dizziness.

## 2019-11-19 NOTE — ED ADULT NURSE NOTE - CHPI ED NUR SYMPTOMS NEG
no abrasion/no numbness/no weakness/no deformity/no fever/no tingling/no difficulty bearing weight/no stiffness/no back pain/no bruising

## 2019-11-19 NOTE — ED ADULT NURSE NOTE - NSIMPLEMENTINTERV_GEN_ALL_ED
Implemented All Fall Risk Interventions:  [place] to call system. Call bell, personal items and telephone within reach. Instruct patient to call for assistance. Room bathroom lighting operational. Non-slip footwear when patient is off stretcher. Physically safe environment: no spills, clutter or unnecessary equipment. Stretcher in lowest position, wheels locked, appropriate side rails in place. Provide visual cue, wrist band, yellow gown, etc. Monitor gait and stability. Monitor for mental status changes and reorient to person, place, and time. Review medications for side effects contributing to fall risk. Reinforce activity limits and safety measures with patient and family.

## 2019-11-19 NOTE — ED PROVIDER NOTE - CLINICAL SUMMARY MEDICAL DECISION MAKING FREE TEXT BOX
80 yo female patient presents s/p fall with shoulder pain. Since patient cannot explain her reason for fall, will obtain cardiac workup and XR of arm. Will reassess. 80 yo female patient presents s/p fall with right shoulder pain. Since patient cannot explain her reason for fall, will obtain cardiac workup and XR of arm. Will reassess. 78 yo female patient presents s/p fall with right shoulder pain. Since patient cannot explain her reason for fall, will obtain labs and XR of arm. Will reassess.

## 2019-11-19 NOTE — ED PROVIDER NOTE - ATTESTATION, MLM
no difficulties
I have reviewed and confirmed nurses' notes for patient's medications, allergies, medical history, and surgical history.

## 2019-11-19 NOTE — ED PROVIDER NOTE - MUSCULOSKELETAL, MLM
no c spine tenderness. Left shoulder and fore arm tenderness, left elbow with swelling. NVIT, medial, ulnar and radial motor strength intact.

## 2019-11-19 NOTE — ED ADULT NURSE NOTE - CAS TRG GENERAL NORM CIRC DET
Param Reilly's chief complaint for this visit includes:  Chief Complaint   Patient presents with     RECHECK     recheck after synvisc     PCP: Noa Kaminski    Referring Provider:  No referring provider defined for this encounter.    /89   Pulse 79   SpO2 98%   Moderate Pain (5)     Do you need any medication refills at today's visit? no      
Strong peripheral pulses

## 2019-12-04 NOTE — HISTORY OF PRESENT ILLNESS
[FreeTextEntry1] : establish care [de-identified] : 77 year old female with h/o T2DM, HTN, L Knee OA, Arthralgia, s/p L carotid endarterectomy (5/2017), TIA here for medical follow-up.  Her son accompanies her today and provides supplemental history.  Pt reports feeling well today - denies CP, SOB, dizziness, weakness, palpitations.  ROS as stated.\par \par She's recently returned after travelling out of the country for 6 months.  Pt fell in her apartment in [place] several weeks ago.  She was changing her shoes while standing, lost her balance, fell landing on her upper back.  Imaging report provided, XR thoracic spine revealed degenerative changes, no acute fractures. Her pain has been controlled on Celebrex. Has not titrated off to see if remains well controlled.  She denies numbness/tingling/weakness in her arms or legs.  \par \par Her son feels she should not be alone and has made arrangements for a private A to care for her.  Patient states she does not need one.  \par \par Her son expresses concerns she has memory loss, similar to what she experienced on Atorvastatin.  He discontinued Rosuvastatin yesterday, plans to discuss her Cardiologist next week.

## 2019-12-05 ENCOUNTER — APPOINTMENT (OUTPATIENT)
Dept: INTERNAL MEDICINE | Facility: CLINIC | Age: 79
End: 2019-12-05

## 2020-04-20 NOTE — PATIENT PROFILE ADULT. - FUNCTIONAL SCREEN CURRENT LEVEL: COMMUNICATION, MLM
Yosef Lino is a 78 y.o. female being evaluated by a Virtual Visit (video visit) encounter to address concerns as mentioned above. A caregiver was present when appropriate. Due to this being a TeleHealth encounter (During [facility] public health emergency), evaluation of the following organ systems was limited: Vitals/Constitutional/EENT/Resp/CV/GI//MS/Neuro/Skin/Heme-Lymph-Imm. Pursuant to the emergency declaration under the [address] [place] [address] and the [employer] Act, this Virtual Visit was conducted with patient's (and/or legal guardian's) consent, to reduce the patient's risk of exposure to COVID-19 and provide necessary medical care. The patient (and/or legal guardian) has also been advised to contact this office for worsening conditions or problems, and seek emergency medical treatment and/or call 911 if deemed necessary. Services were provided through a video synchronous discussion virtually to substitute for in-person clinic visit. Patient's location was at their home. --Dedrick Davis PA-C on 4/20/2020 at 11:05 AM    An electronic signature was used to authenticate this note. History of present illness: The patient had a virtual visit today after left lateral total hip replacement. Pain control as been satisfactory with oral medications. There have been no fevers or chills. Patient is 6 weeks postop. Patient states that her pain level is dramatically better than it was before surgery. She does have some mild groin pain remaining with a max pain of approximately 3/10. Patient just recently has been released from the [facility]. Physical examination: The incision is clean, dry, and intact with no drainage or signs of infection. There is expected swelling with no evidence of DVT. Neurovascular exam is intact.     Assessment/plan: Patient can be weightbearing as tolerated and work with (0) understands/communicates without difficulty

## 2020-05-05 NOTE — ED ADULT TRIAGE NOTE - RESPIRATORY RATE (BREATHS/MIN)
Patient alert and oriented x 4, moves all extremities well, up to chair. Low grade temp 99.3 per bladder. Blood pressure low, 90/62. Michaela NP called, gave albumin 500 mg bolus per order, blood pressure unchanged. Okay to transfer to station 33 per Michaela Np. All belongings sent with patient. Spouse called with update and transfer.     18

## 2020-09-17 ENCOUNTER — APPOINTMENT (OUTPATIENT)
Dept: VASCULAR SURGERY | Facility: CLINIC | Age: 80
End: 2020-09-17

## 2020-09-18 NOTE — H&P ADULT - NEGATIVE CARDIOVASCULAR SYMPTOMS
no dyspnea on exertion/no chest pain/no palpitations Complex Repair And Split-Thickness Skin Graft Text: The defect edges were debeveled with a #15 scalpel blade.  The primary defect was closed partially with a complex linear closure.  Given the location of the defect, shape of the defect and the proximity to free margins a split thickness skin graft was deemed most appropriate to repair the remaining defect.  The graft was trimmed to fit the size of the remaining defect.  The graft was then placed in the primary defect, oriented appropriately, and sutured into place.

## 2020-10-23 NOTE — PROGRESS NOTE ADULT - ENDOCRINE
Chief Complaint   Patient presents with     Medicare Visit     Pt would like medicare annual wellness visit     LIDIA Jacques at 8:58 AM sign on 10/23/2020    
negative

## 2020-12-16 PROBLEM — Z86.19 HISTORY OF CANDIDIASIS OF VAGINA: Status: RESOLVED | Noted: 2018-08-22 | Resolved: 2020-12-16

## 2020-12-21 NOTE — PROGRESS NOTE ADULT - SUBJECTIVE AND OBJECTIVE BOX
Situation:  Follow up call.    Key Assessments:  Depression    Actions Taken:  Patient tearful on the phone again.  She declines offer of medication management at this time. She thinks her mood fluctuations are due to her hormone pills she is taking for her breast cancer.    Plan:  Follow up in 1 week. I have started the conversation for discharge. Patient would like me to follow her yet through the holidays.  Will repeat JULIANA on follow up call.    See hyperlinks within encounter for full documentation.    Time of Visit:  Patient seen and examined. no sob    MEDICATIONS  (STANDING):  heparin  Injectable 5000Unit(s) SubCutaneous every 8 hours  aspirin  chewable 81milliGRAM(s) Oral daily  docusate sodium 100milliGRAM(s) Oral two times a day  insulin lispro (HumaLOG) corrective regimen sliding scale  SubCutaneous at bedtime  pantoprazole    Tablet 40milliGRAM(s) Oral before breakfast  tamsulosin 0.4milliGRAM(s) Oral at bedtime  insulin lispro (HumaLOG) corrective regimen sliding scale  SubCutaneous three times a day before meals  dextrose 5%. 1000milliLiter(s) IV Continuous <Continuous>  sodium chloride 0.9%. 1000milliLiter(s) IV Continuous <Continuous>  sodium chloride 0.9% Bolus 500milliLiter(s) IV Bolus once  nystatin Powder 1Application(s) Topical two times a day  losartan 25milliGRAM(s) Oral daily      MEDICATIONS  (PRN):  acetaminophen  Suppository 650milliGRAM(s) Rectal every 6 hours PRN For Temp greater than 38 C (100.4 F)  acetaminophen   Tablet. 650milliGRAM(s) Oral every 6 hours PRN Mild Pain (1 - 3)       Medications up to date at time of exam.      PHYSICAL EXAMINATION:  Patient has no new complaints.  GENERAL: The patient is a well-developed, well-nourished, in no apparent distress.     Vital Signs Last 24 Hrs  T(C): 36.6, Max: 36.9 (05-22 @ 20:28)  T(F): 97.8, Max: 98.5 (05-22 @ 20:28)  HR: 82 (75 - 86)  BP: 128/57 (127/52 - 152/79)  BP(mean): --  RR: 16 (16 - 16)  SpO2: 98% (95% - 99%)   (if applicable)    Chest Tube (if applicable)    HEENT: Head is normocephalic and atraumatic. Extraocular muscles are intact. Mucous membranes are moist.     NECK: Supple, no palpable adenopathy.    LUNGS: Clear to auscultation, no wheezing, rales, or rhonchi.    HEART: Regular rate and rhythm without murmur.    ABDOMEN: Soft, nontender, and nondistended.  No hepatosplenomegaly is noted.    EXTREMITIES: Without any cyanosis, clubbing, rash, lesions or edema.    NEUROLOGIC: Awake, alert, oriented.     SKIN: Warm, dry, good turgor.      LABS:                        11.2   9.9   )-----------( 366      ( 23 May 2017 12:33 )             32.5     05-22    140  |  104  |  10  ----------------------------<  196<H>  3.8   |  29  |  0.94    Ca    8.8      22 May 2017 12:41                          MICROBIOLOGY: (if applicable)    RADIOLOGY & ADDITIONAL STUDIES:  EKG:   CXR:  ECHO:    IMPRESSION: 76y Female PAST MEDICAL & SURGICAL HISTORY:  Type 2 diabetes mellitus  Essential hypertension  DM (diabetes mellitus)  HTN (hypertension)  Fibromyalgia  History of appendectomy  No significant past surgical history   p/w  p/w p/w TIA, s/p L CEA on 5/15/2017. Son is reporting patient is becoming more confused.     RECOMMENDATIONS:     - med per cardiology. Son is refusing certain meds stated it is contrubuting to confusion.   - pt s/p 1 unit PRBCs yesterday.   - DVT and GI prophylaxis.

## 2021-01-20 NOTE — ED ADULT NURSE NOTE - CHPI ED NUR AGGRAVATING FX
Situation: Called patient for [facility] program follow-up.      Key Assessments:    1.  Patient reports receiving walking boot for foot fracture, and she states she is to return to podiatrist in February to receive additional x-rays.  States that she has been elevating her legs frequently, and edema has gone down.  Reports pain is, \"not bad really, not bad at all.\"    2.  States her COPD has been stable.  Denies any increase in SOB, coughing, or wheezing.  Plans to return oxygen equipment as she is no longer using.    3.  Reports blood sugars have been stable lately, typical range 160-170.  Denies any recent episodes of hypo- or hyperglycemia.      Actions Taken:    1.  Confirmed patient has family transportation to upcoming podiatrist appointments and does not foresee any barriers to attending.  Encouraged to continue elevation and acetaminophen as needed for pain.    2.  Educated patient to contact [facility] or PCP's office with new or worsening symptoms.    3.  Reminded patient of outstanding lab work, including glycohemoglobin.  Patient reports due to foot injury and fear of falling again on the ice she will likely wait until spring to complete.      Plan: Follow-up in two weeks.  Review notes from 2/3 appointment.      See hyperlinks within encounter for full documentation  
fall

## 2021-04-23 NOTE — ED ADULT TRIAGE NOTE - WEIGHT IN LBS
spoke with son mother is better still a little weak no syncope pt is resting and drinking fluids 141

## 2021-11-18 NOTE — H&P ADULT - EYES
detailed exam PERRL/EOMI/conjunctiva clear Class II - visualization of the soft palate, fauces, and uvula

## 2022-03-29 NOTE — ED PROVIDER NOTE - ST/T WAVE
pleasant, well nourished, well developed, in no acute distress , normal communication ability normal

## 2022-06-26 NOTE — PHYSICAL THERAPY INITIAL EVALUATION ADULT - SHORT TERM MEMORY, REHAB EVAL
[id number] [facility] ED  [address] [facility] [address]  Phone: [phone]  Fax: [address] [phone]      Pt Name: Thomas Alvarado  MRN: [id number]  [place] 1972  Date of evaluation: 6/26/2022    CHIEF COMPLAINT       Chief Complaint   Patient presents with    Abscess     left side back of neck, upper back, lump that is painful       HISTORY OF PRESENT ILLNESS    Thomas Alvarado is a 54 y.o. male who presents recurrent abscess on his neck. Patient states he has had them on his legs and thigh but has never had them on his neck and is concerned because of the pain and soft tissue swelling he notes no fevers or chills or other complaints. He is requesting that we open it and drain it. REVIEW OF SYSTEMS     Constitutional: No fevers or chills   HEENT: No sore throat, rhinorrhea, or earache   Eyes: No blurry vision or double vision no drainage   Cardiovascular: No chest pain or tachycardia   Respiratory: No wheezing or shortness of breath no cough   Gastrointestinal: No nausea, vomiting, diarrhea, constipation, or abdominal pain   : No hematuria or dysuria   Musculoskeletal: No swelling or pain   Skin: See above   neurological: No focal neurologic complaints, paresthesias, weakness, or headache   PAST MEDICAL HISTORY    has a past medical history of Hypertension. SURGICAL HISTORY      has no past surgical history on file. CURRENT MEDICATIONS       Previous Medications    LISINOPRIL-HYDROCHLOROTHIAZIDE (PRINZIDE;ZESTORETIC) 20-12.5 MG PER TABLET    lisinopril 20 mg-hydrochlorothiazide 12.5 mg tablet    METOPROLOL SUCCINATE (TOPROL XL) 25 MG EXTENDED RELEASE TABLET    metoprolol succinate ER 25 mg tablet,extended release 24 hr       ALLERGIES     has No Known Allergies. FAMILY HISTORY     has no family status information on file. family history is not on file. SOCIAL HISTORY      reports that he has quit smoking.  He has never used smokeless tobacco. He reports previous alcohol use. He reports that he does not use drugs. PHYSICAL EXAM       ED Triage Vitals   BP Temp Temp src Pulse Resp SpO2 Height Weight   -- -- -- -- -- -- -- --     Constitutional: Alert, oriented x3, nontoxic, answering questions appropriately, acting properly for age, in no acute distress   HEENT: Extraocular muscles intact, mucus membranes moist, TMs clear bilaterally, no posterior pharyngeal erythema or exudates, Pupils equal, round, reactive to light,   Neck: Trachea midline   Cardiovascular: Regular rhythm and rate no murmurs   Respiratory: Clear to auscultation bilaterally no wheezes, rhonchi, rales, no respiratory distress no tachypnea no retractions no hypoxia  Gastrointestinal: Soft, nontender, nondistended, positive bowel sounds. No rebound, rigidity, or guarding. Musculoskeletal: No extremity pain or swelling   Neurologic: Moving all 4 extremities without difficulty there are no gross focal neurologic deficits   Skin: An abscess measuring approximately 4 cm x 3 cm on the posterior occipital region of the scalp overlying abrasions contusion ecchymosis some dried date consistent with his attempted manipulation. DIFFERENTIAL DIAGNOSIS/ MDM:     Abscess versus cyst    DIAGNOSTIC RESULTS     EKG: All EKG's are interpreted by the Emergency Department Physician who either signs or Co-signs this chart in the absence of a cardiologist.        Not indicated unless otherwise documented above    LABS:  No results found for this visit on 06/26/22.     Not indicated unless otherwise documented above    RADIOLOGY:   I reviewed the radiologist interpretations:    No orders to display       Not indicated unless otherwise documented above    EMERGENCY DEPARTMENT COURSE:     The patient was given the following medications:  Orders Placed This Encounter   Medications    DISCONTD: lidocaine 1 % injection 20 mL    lidocaine 2 % injection 5 mL    doxycycline hyclate (VIBRAMYCIN) 100 MG capsule     Sig: Take 1 capsule by mouth 2 times daily for 7 days     Dispense:  14 capsule     Refill:  0     May substitute another form of doxycycline if insurance requires.  mupirocin (BACTROBAN) 2 % ointment     Sig: Apply 3 times daily     Dispense:  1 each     Refill:  0    naproxen (NAPROSYN) 500 MG tablet     Sig: Take 1 tablet by mouth 2 times daily     Dispense:  10 tablet     Refill:  0        Vitals:   -------------------------  /80   Pulse (!) 112   Temp 98.3 °F (36.8 °C) (Oral)   Resp 14   Ht 5' 11\" (1.803 m)   Wt 122.5 kg (270 lb)   SpO2 95%   BMI 37.66 kg/m²         I have reviewed the disposition diagnosis with the patient and or their family/guardian. I have answered their questions and given discharge instructions. They voiced understanding of these instructions and did not have any furtherquestions or complaints. CRITICAL CARE:    None    CONSULTS:    None    PROCEDURES:    Abscess drainage  Patient gives permission to drain the abscess on his left neck area. Patient was prepped in a sterile fashion with Betadine and  Lidocaine was instilled and a #10 scalpel blade was to incise  the wound with moderate amount of elation break-up irrigation the wound was then packed after cleansing. Patient tolerated procedure well  Less than 5 cc of blood lost.      OARRS Report if indicated             FINAL IMPRESSION      1. Abscess          DISPOSITION/PLAN   DISPOSITION Decision To Discharge 06/26/2022 12:09:28 PM        CONDITION ON DISPOSITION: STABLE       PATIENT REFERRED TO:  Shauna Garza, DO  [address] [place] [address].  ARABELLA.  [address] [place] [id number]  [phone]    In 3 days  For wound re-check      DISCHARGE MEDICATIONS:  New Prescriptions    DOXYCYCLINE HYCLATE (VIBRAMYCIN) 100 MG CAPSULE    Take 1 capsule by mouth 2 times daily for 7 days    MUPIROCIN (BACTROBAN) 2 % OINTMENT    Apply 3 times daily    NAPROXEN (NAPROSYN) 500 MG TABLET    Take 1 tablet by mouth 2 times daily       (Please note that portions of thisnote were completed with a voice recognition program.  Efforts were made to edit the dictations but occasionally words are mis-transcribed.)    Will Kenney MD,, MD  Attending Emergency Physician        Will Kenney MD  06/26/22 [id number] intact

## 2022-10-19 NOTE — ED ADULT NURSE NOTE - TEMPLATE LIST FOR HEAD TO TOE ASSESSMENT
General Cephalexin Counseling: I counseled the patient regarding use of cephalexin as an antibiotic for prophylactic and/or therapeutic purposes. Cephalexin (commonly prescribed under brand name Keflex) is a cephalosporin antibiotic which is active against numerous classes of bacteria, including most skin bacteria. Side effects may include nausea, diarrhea, gastrointestinal upset, rash, hives, yeast infections, and in rare cases, hepatitis, kidney disease, seizures, fever, confusion, neurologic symptoms, and others. Patients with severe allergies to penicillin medications are cautioned that there is about a 10% incidence of cross-reactivity with cephalosporins. When possible, patients with penicillin allergies should use alternatives to cephalosporins for antibiotic therapy.

## 2023-03-09 NOTE — ED PROVIDER NOTE - DISCUSSED CLINICAL AND RADIOLOGICAL FINDINGS WITH, MDM
Date of Surgery Update:      Katie Travis  was seen, history and physical examination reviewed, and patient examined by me today. There have been no significant clinical changes since the completion of the previous history and physical.    The patient and I discussed that this is an elective procedure/surgery. We discussed the risks of the procedure/surgery, including but not limited to what is outlined in the signed informed consent. We also discussed the risk of digna COVID 19 while in the facility. We discussed the increased risk of a bad outcome should the patient contract COVID 19 during the post-procedural/post-operative period, given the patients current health condition, chronic conditions, and the added risk of COVID 19 in light of these conditions. The patient and I also discussed the risk of further postponing the procedure/surgery and other treatment alternatives, including non-procedural/surgical treatments. The risk, benefits, and alternatives of the proposed procedure have been explained to the patient (or appropriate guardian) and understanding verbalized. All questions answered. Patient wishes to proceed.         Electronically signed by: Skinny Adkins MD,3/9/2023,1:44 PM family/patient

## 2023-03-14 NOTE — ED ADULT TRIAGE NOTE - AS HEIGHT TYPE
You must understand that you've received an Urgent Care treatment only and that you may be released before all your medical problems are known or treated. You, the patient, will arrange for follow up care as instructed.      Follow up with your PCP or specialty clinic as instructed in the next 2-3 days if not improved or as needed. You can call [phone] to schedule an appointment with appropriate provider.      If you condition worsens, we recommend that you receive another evaluation at the emergency room immediately or contact your primary medical clinic's after hours call service to discuss your concerns.      Please return here or go to the Emergency Department for any concerns or worsening condition.      If you were prescribed a narcotic or controlled substance, do not drive or operate heavy equipment or machinery while taking these medications.      stated No

## 2023-08-29 NOTE — PHYSICAL THERAPY INITIAL EVALUATION ADULT - MUSCLE TONE ASSESSMENT, REHAB EVAL
normal/bilateral lower extremities/bilateral upper extremities Propranolol Pregnancy And Lactation Text: This medication is Pregnancy Category C and it isn't known if it is safe during pregnancy. It is excreted in breast milk.

## 2024-05-16 NOTE — PHYSICAL THERAPY INITIAL EVALUATION ADULT - LEVEL OF INDEPENDENCE: STAIR NEGOTIATION, REHAB EVAL
Please follow up with ophthalmologist in 1-2 days for further evaluation and management.   Please return back to the ER if your symptoms worsens or if you develop any new symptoms.    unable to perform

## 2024-05-31 NOTE — ED ADULT NURSE NOTE - CHIEF COMPLAINT QUOTE
BiBA for c/o  syncopal episode at home  , pt went to do BM after BM pt syncopize and  then vomitted Positive

## 2024-07-10 NOTE — H&P ADULT - NSHPPOAURINARYCATHETER_GEN_ALL_CORE
Abdomen , soft, nontender, nondistended , no guarding or rigidity , no masses palpable , normal bowel sounds , Liver and Spleen , no hepatomegaly present , no hepatosplenomegaly , liver nontender , spleen not palpable
no

## 2025-01-16 NOTE — H&P ADULT - NSHPROSALLOTHERNEGRD_GEN_ALL_CORE
Pharmacy will forward  PA for Zepbound will forward to PA dept.   All other review of systems negative, except as noted in HPI